# Patient Record
Sex: FEMALE | Race: WHITE | NOT HISPANIC OR LATINO | Employment: OTHER | URBAN - METROPOLITAN AREA
[De-identification: names, ages, dates, MRNs, and addresses within clinical notes are randomized per-mention and may not be internally consistent; named-entity substitution may affect disease eponyms.]

---

## 2018-04-07 ENCOUNTER — APPOINTMENT (EMERGENCY)
Dept: RADIOLOGY | Facility: HOSPITAL | Age: 83
DRG: 282 | End: 2018-04-07
Payer: MEDICARE

## 2018-04-07 ENCOUNTER — APPOINTMENT (EMERGENCY)
Dept: CT IMAGING | Facility: HOSPITAL | Age: 83
DRG: 282 | End: 2018-04-07
Payer: MEDICARE

## 2018-04-07 ENCOUNTER — HOSPITAL ENCOUNTER (INPATIENT)
Facility: HOSPITAL | Age: 83
LOS: 2 days | Discharge: HOME | DRG: 282 | End: 2018-04-09
Attending: INTERNAL MEDICINE | Admitting: INTERNAL MEDICINE
Payer: MEDICARE

## 2018-04-07 DIAGNOSIS — R77.8 ELEVATED TROPONIN: ICD-10-CM

## 2018-04-07 DIAGNOSIS — I16.0 HYPERTENSIVE URGENCY: Primary | ICD-10-CM

## 2018-04-07 DIAGNOSIS — E87.6 HYPOKALEMIA: ICD-10-CM

## 2018-04-07 DIAGNOSIS — R09.02 HYPOXIA: ICD-10-CM

## 2018-04-07 DIAGNOSIS — R79.89 LOW SERUM THYROID STIMULATING HORMONE (TSH): ICD-10-CM

## 2018-04-07 PROBLEM — F41.9 ANXIETY: Status: ACTIVE | Noted: 2018-04-07

## 2018-04-07 PROBLEM — M19.90 ARTHRITIS: Status: ACTIVE | Noted: 2018-04-07

## 2018-04-07 LAB
ALBUMIN SERPL BCP-MCNC: 3.7 G/DL (ref 3.5–5)
ALP SERPL-CCNC: 146 U/L (ref 46–116)
ALT SERPL W P-5'-P-CCNC: 27 U/L (ref 12–78)
ANION GAP SERPL CALCULATED.3IONS-SCNC: 9 MMOL/L (ref 4–13)
ANISOCYTOSIS BLD QL SMEAR: PRESENT
APTT PPP: 28 SECONDS (ref 23–35)
AST SERPL W P-5'-P-CCNC: 20 U/L (ref 5–45)
BACTERIA UR QL AUTO: ABNORMAL /HPF
BASOPHILS # BLD MANUAL: 0 THOUSAND/UL (ref 0–0.1)
BASOPHILS NFR MAR MANUAL: 0 % (ref 0–1)
BILIRUB SERPL-MCNC: 0.3 MG/DL (ref 0.2–1)
BILIRUB UR QL STRIP: NEGATIVE
BUN SERPL-MCNC: 17 MG/DL (ref 5–25)
CALCIUM SERPL-MCNC: 10 MG/DL (ref 8.3–10.1)
CHLORIDE SERPL-SCNC: 102 MMOL/L (ref 100–108)
CLARITY UR: CLEAR
CO2 SERPL-SCNC: 32 MMOL/L (ref 21–32)
COLOR UR: YELLOW
CREAT SERPL-MCNC: 0.88 MG/DL (ref 0.6–1.3)
EOSINOPHIL # BLD MANUAL: 0.33 THOUSAND/UL (ref 0–0.4)
EOSINOPHIL NFR BLD MANUAL: 3 % (ref 0–6)
ERYTHROCYTE [DISTWIDTH] IN BLOOD BY AUTOMATED COUNT: 17.2 % (ref 11.6–15.1)
GFR SERPL CREATININE-BSD FRML MDRD: 59 ML/MIN/1.73SQ M
GLUCOSE SERPL-MCNC: 84 MG/DL (ref 65–140)
GLUCOSE UR STRIP-MCNC: NEGATIVE MG/DL
HCT VFR BLD AUTO: 41.5 % (ref 34.8–46.1)
HGB BLD-MCNC: 12.7 G/DL (ref 11.5–15.4)
HGB UR QL STRIP.AUTO: NEGATIVE
INR PPP: 0.93 (ref 0.86–1.16)
KETONES UR STRIP-MCNC: NEGATIVE MG/DL
LEUKOCYTE ESTERASE UR QL STRIP: ABNORMAL
LYMPHOCYTES # BLD AUTO: 1.34 THOUSAND/UL (ref 0.6–4.47)
LYMPHOCYTES # BLD AUTO: 12 % (ref 14–44)
MAGNESIUM SERPL-MCNC: 1.8 MG/DL (ref 1.6–2.6)
MCH RBC QN AUTO: 25.4 PG (ref 26.8–34.3)
MCHC RBC AUTO-ENTMCNC: 30.6 G/DL (ref 31.4–37.4)
MCV RBC AUTO: 83 FL (ref 82–98)
MONOCYTES # BLD AUTO: 0.67 THOUSAND/UL (ref 0–1.22)
MONOCYTES NFR BLD: 6 % (ref 4–12)
NEUTROPHILS # BLD MANUAL: 8.36 THOUSAND/UL (ref 1.85–7.62)
NEUTS BAND NFR BLD MANUAL: 1 % (ref 0–8)
NEUTS SEG NFR BLD AUTO: 74 % (ref 43–75)
NITRITE UR QL STRIP: NEGATIVE
NON-SQ EPI CELLS URNS QL MICRO: ABNORMAL /HPF
NT-PROBNP SERPL-MCNC: 967 PG/ML
PH UR STRIP.AUTO: 7 [PH] (ref 4.5–8)
PLATELET # BLD AUTO: 348 THOUSANDS/UL (ref 149–390)
PLATELET # BLD AUTO: 404 THOUSANDS/UL (ref 149–390)
PLATELET BLD QL SMEAR: ADEQUATE
PMV BLD AUTO: 10.7 FL (ref 8.9–12.7)
PMV BLD AUTO: 11.1 FL (ref 8.9–12.7)
POIKILOCYTOSIS BLD QL SMEAR: PRESENT
POTASSIUM SERPL-SCNC: 3.3 MMOL/L (ref 3.5–5.3)
PROT SERPL-MCNC: 7.5 G/DL (ref 6.4–8.2)
PROT UR STRIP-MCNC: ABNORMAL MG/DL
PROTHROMBIN TIME: 12.7 SECONDS (ref 12.1–14.4)
RBC # BLD AUTO: 5 MILLION/UL (ref 3.81–5.12)
RBC #/AREA URNS AUTO: ABNORMAL /HPF
SODIUM SERPL-SCNC: 143 MMOL/L (ref 136–145)
SP GR UR STRIP.AUTO: 1.01 (ref 1–1.03)
TOTAL CELLS COUNTED SPEC: 100
TROPONIN I SERPL-MCNC: 0.08 NG/ML
TROPONIN I SERPL-MCNC: 0.09 NG/ML
TROPONIN I SERPL-MCNC: 0.1 NG/ML
TSH SERPL DL<=0.05 MIU/L-ACNC: 0.01 UIU/ML (ref 0.36–3.74)
UROBILINOGEN UR QL STRIP.AUTO: 0.2 E.U./DL
VARIANT LYMPHS # BLD AUTO: 4 %
WBC # BLD AUTO: 11.14 THOUSAND/UL (ref 4.31–10.16)
WBC #/AREA URNS AUTO: ABNORMAL /HPF

## 2018-04-07 PROCEDURE — 85027 COMPLETE CBC AUTOMATED: CPT | Performed by: PHYSICIAN ASSISTANT

## 2018-04-07 PROCEDURE — 84484 ASSAY OF TROPONIN QUANT: CPT | Performed by: INTERNAL MEDICINE

## 2018-04-07 PROCEDURE — 84443 ASSAY THYROID STIM HORMONE: CPT | Performed by: PHYSICIAN ASSISTANT

## 2018-04-07 PROCEDURE — 81001 URINALYSIS AUTO W/SCOPE: CPT

## 2018-04-07 PROCEDURE — 85049 AUTOMATED PLATELET COUNT: CPT | Performed by: INTERNAL MEDICINE

## 2018-04-07 PROCEDURE — 96374 THER/PROPH/DIAG INJ IV PUSH: CPT

## 2018-04-07 PROCEDURE — 99285 EMERGENCY DEPT VISIT HI MDM: CPT

## 2018-04-07 PROCEDURE — 85730 THROMBOPLASTIN TIME PARTIAL: CPT | Performed by: PHYSICIAN ASSISTANT

## 2018-04-07 PROCEDURE — 96376 TX/PRO/DX INJ SAME DRUG ADON: CPT

## 2018-04-07 PROCEDURE — 85610 PROTHROMBIN TIME: CPT | Performed by: PHYSICIAN ASSISTANT

## 2018-04-07 PROCEDURE — 80053 COMPREHEN METABOLIC PANEL: CPT | Performed by: PHYSICIAN ASSISTANT

## 2018-04-07 PROCEDURE — 70450 CT HEAD/BRAIN W/O DYE: CPT

## 2018-04-07 PROCEDURE — 71046 X-RAY EXAM CHEST 2 VIEWS: CPT

## 2018-04-07 PROCEDURE — 93005 ELECTROCARDIOGRAM TRACING: CPT

## 2018-04-07 PROCEDURE — 83735 ASSAY OF MAGNESIUM: CPT | Performed by: PHYSICIAN ASSISTANT

## 2018-04-07 PROCEDURE — 84484 ASSAY OF TROPONIN QUANT: CPT | Performed by: PHYSICIAN ASSISTANT

## 2018-04-07 PROCEDURE — 36415 COLL VENOUS BLD VENIPUNCTURE: CPT | Performed by: PHYSICIAN ASSISTANT

## 2018-04-07 PROCEDURE — 83880 ASSAY OF NATRIURETIC PEPTIDE: CPT | Performed by: PHYSICIAN ASSISTANT

## 2018-04-07 PROCEDURE — 99223 1ST HOSP IP/OBS HIGH 75: CPT | Performed by: INTERNAL MEDICINE

## 2018-04-07 PROCEDURE — 85007 BL SMEAR W/DIFF WBC COUNT: CPT | Performed by: PHYSICIAN ASSISTANT

## 2018-04-07 RX ORDER — ONDANSETRON 2 MG/ML
4 INJECTION INTRAMUSCULAR; INTRAVENOUS EVERY 6 HOURS PRN
Status: DISCONTINUED | OUTPATIENT
Start: 2018-04-07 | End: 2018-04-09 | Stop reason: HOSPADM

## 2018-04-07 RX ORDER — VENLAFAXINE HYDROCHLORIDE 37.5 MG/1
75 CAPSULE, EXTENDED RELEASE ORAL DAILY
Status: DISCONTINUED | OUTPATIENT
Start: 2018-04-08 | End: 2018-04-09 | Stop reason: HOSPADM

## 2018-04-07 RX ORDER — LABETALOL HYDROCHLORIDE 5 MG/ML
10 INJECTION, SOLUTION INTRAVENOUS ONCE
Status: COMPLETED | OUTPATIENT
Start: 2018-04-07 | End: 2018-04-07

## 2018-04-07 RX ORDER — VENLAFAXINE 37.5 MG/1
75 TABLET ORAL
Status: DISCONTINUED | OUTPATIENT
Start: 2018-04-08 | End: 2018-04-07

## 2018-04-07 RX ORDER — TRIAMTERENE AND HYDROCHLOROTHIAZIDE 37.5; 25 MG/1; MG/1
1 TABLET ORAL DAILY
Status: DISCONTINUED | OUTPATIENT
Start: 2018-04-08 | End: 2018-04-09 | Stop reason: HOSPADM

## 2018-04-07 RX ORDER — ACETAMINOPHEN 325 MG/1
650 TABLET ORAL EVERY 6 HOURS PRN
Status: DISCONTINUED | OUTPATIENT
Start: 2018-04-07 | End: 2018-04-09 | Stop reason: HOSPADM

## 2018-04-07 RX ORDER — VIT A/VIT C/VIT E/ZINC/COPPER 4296-226
1 CAPSULE ORAL 2 TIMES DAILY
Status: DISCONTINUED | OUTPATIENT
Start: 2018-04-08 | End: 2018-04-09 | Stop reason: HOSPADM

## 2018-04-07 RX ORDER — ASPIRIN 81 MG/1
324 TABLET, CHEWABLE ORAL ONCE
Status: COMPLETED | OUTPATIENT
Start: 2018-04-07 | End: 2018-04-07

## 2018-04-07 RX ORDER — ASPIRIN 81 MG/1
81 TABLET, CHEWABLE ORAL EVERY MORNING
Status: DISCONTINUED | OUTPATIENT
Start: 2018-04-08 | End: 2018-04-09 | Stop reason: HOSPADM

## 2018-04-07 RX ORDER — LISINOPRIL 10 MG/1
10 TABLET ORAL DAILY
Status: DISCONTINUED | OUTPATIENT
Start: 2018-04-08 | End: 2018-04-08

## 2018-04-07 RX ORDER — CHOLECALCIFEROL (VITAMIN D3) 10 MCG
1 TABLET ORAL 2 TIMES DAILY
Status: DISCONTINUED | OUTPATIENT
Start: 2018-04-07 | End: 2018-04-09 | Stop reason: HOSPADM

## 2018-04-07 RX ORDER — DIPHENHYDRAMINE HYDROCHLORIDE 50 MG/ML
25 INJECTION INTRAMUSCULAR; INTRAVENOUS EVERY 6 HOURS PRN
Status: DISCONTINUED | OUTPATIENT
Start: 2018-04-07 | End: 2018-04-09 | Stop reason: HOSPADM

## 2018-04-07 RX ORDER — ALPRAZOLAM 0.25 MG/1
0.25 TABLET ORAL 3 TIMES DAILY PRN
Status: DISCONTINUED | OUTPATIENT
Start: 2018-04-07 | End: 2018-04-09 | Stop reason: HOSPADM

## 2018-04-07 RX ORDER — HYDRALAZINE HYDROCHLORIDE 20 MG/ML
10 INJECTION INTRAMUSCULAR; INTRAVENOUS EVERY 4 HOURS PRN
Status: DISCONTINUED | OUTPATIENT
Start: 2018-04-07 | End: 2018-04-08

## 2018-04-07 RX ORDER — CALCIUM CARBONATE 200(500)MG
1000 TABLET,CHEWABLE ORAL DAILY PRN
Status: DISCONTINUED | OUTPATIENT
Start: 2018-04-07 | End: 2018-04-09 | Stop reason: HOSPADM

## 2018-04-07 RX ORDER — DOCUSATE SODIUM 100 MG/1
100 CAPSULE, LIQUID FILLED ORAL 2 TIMES DAILY
Status: DISCONTINUED | OUTPATIENT
Start: 2018-04-07 | End: 2018-04-09 | Stop reason: HOSPADM

## 2018-04-07 RX ADMIN — HYDRALAZINE HYDROCHLORIDE 10 MG: 20 INJECTION INTRAMUSCULAR; INTRAVENOUS at 17:37

## 2018-04-07 RX ADMIN — LABETALOL 20 MG/4 ML (5 MG/ML) INTRAVENOUS SYRINGE 10 MG: at 13:37

## 2018-04-07 RX ADMIN — ASPIRIN 81 MG 324 MG: 81 TABLET ORAL at 13:36

## 2018-04-07 RX ADMIN — ACETAMINOPHEN 650 MG: 325 TABLET, FILM COATED ORAL at 22:38

## 2018-04-07 RX ADMIN — DIPHENHYDRAMINE HYDROCHLORIDE 25 MG: 50 INJECTION, SOLUTION INTRAMUSCULAR; INTRAVENOUS at 21:34

## 2018-04-07 RX ADMIN — NEPHROCAP 1 CAPSULE: 1 CAP ORAL at 18:43

## 2018-04-07 RX ADMIN — LABETALOL 20 MG/4 ML (5 MG/ML) INTRAVENOUS SYRINGE 10 MG: at 14:41

## 2018-04-07 NOTE — ED PROVIDER NOTES
History  Chief Complaint   Patient presents with    Hypertension     pt reports recent medication change lisinipril where they double her dose, Pt states she checked her PB and it was high, being unable to get ahold of her PCP so they came here  Pt has 02 sat of 83 ra      This 60-year-old white female presents emergency room with an elevated blood pressure  She states she took her pressure this morning because she was having a slight dull headache and it was over 200  She was just recently changed from Norvasc to lisinopril  She was initially placed on 25 mg lisinopril that was recently increased to 50 mg  She took her  Medication this morning  She denies any chest pain or shortness of breath  She denies any shortness of breath with activity  She does complain of swelling in her ankles but no specific weight gain  She did present with a pulse ox of 82%  She was immediately put on  Oxygen by nasal cannula and now her saturations are 97% on 2 L  She denies any black tarry stools or bright red blood per rectum  She complains of urinary frequency that is from her Lasix medication  Past medical history is positive for anxiety, arthritis, cancer- skin, acute coronary disease, hearing impairment requiring a hearing aid, hiatal hernia, hypertension, heart murmur, shortness of breath on exertion  Differential diagnosis includes but is not limited to malignant hypertension, hypertensive urgency, end-organ damage, acute coronary syndrome, CHF, pulmonary embolism, myocarditis, pericarditis          History provided by:  Patient  History limited by:  Acuity of condition  Hypertension   Severity:  Severe  Onset quality:  Gradual  Duration:  2 hours  Timing:  Constant  Progression:  Unchanged  Chronicity:  Chronic  Time since last dose of antihypertensive:  4 hours  Notable PTA blood pressures:  200/110  Context: medication change    Context: normal sodium, not caffeine, not drug abuse, not herbal remedies, not noncompliance, not OTC medications used and not stress    Ineffective treatments: Lisinopril  Associated symptoms: headaches and peripheral edema    Associated symptoms: no abdominal pain, no anxiety, no blurred vision, no chest pain, no confusion, no dizziness, no ear pain, no epistaxis, no fatigue, no fever, no hematuria, no hypokalemia, no loss of consciousness, no nausea, no neck pain, no palpitations, no shortness of breath, no syncope, no tinnitus, not vomiting and no weakness    Risk factors: cardiac disease    Risk factors: no alcohol use, no cocaine use, no decongestant use, no diabetes, no family history of hypertension, no kidney disease, no obesity, no prior aneurysm, no prior stroke, no PVD and no tobacco use        Prior to Admission Medications   Prescriptions Last Dose Informant Patient Reported? Taking? ALPRAZolam (XANAX) 0 25 mg tablet   Yes Yes   Sig: Take 0 25 mg by mouth 3 (three) times a day as needed for anxiety  B Complex Vitamins (B-COMPLEX/B-12 PO)   Yes Yes   Sig: Take 1,500 mg by mouth 2 (two) times a day  Cranberry 32923 MG CAPS   Yes Yes   Sig: Take 1 tablet by mouth 2 (two) times a day  LISINOPRIL PO   Yes Yes   Sig: Take by mouth daily   Multiple Vitamins-Minerals (PRESERVISION AREDS PO)   Yes Yes   Sig: Take 1 tablet by mouth 2 (two) times a day  aspirin 81 MG tablet   Yes Yes   Sig: Take 81 mg by mouth every morning  ibuprofen (MOTRIN) 200 mg tablet   Yes No   Sig: Take 200 mg by mouth every 6 (six) hours as needed for mild pain  multivitamin (THERAGRAN) TABS   Yes Yes   Sig: Take 1 tablet by mouth every morning  triamterene-hydrochlorothiazide (DYAZIDE) 37 5-25 mg per capsule   Yes Yes   Sig: Take 1 capsule by mouth once a week     venlafaxine (EFFEXOR) 75 mg tablet   Yes Yes   Sig: Take 75 mg by mouth daily with lunch        Facility-Administered Medications: None       Past Medical History:   Diagnosis Date    Anxiety     Arthritis     left knee    Cancer ([facility] [address] ) skin: face, nose and back (100+ sutures)    Coronary artery disease     Hearing aid worn     bilat    Hiatal hernia     [place] (hard of hearing)     bilat HA    Hypertension     Murmur, heart     SOB (shortness of breath) on exertion     Wears glasses        Past Surgical History:   Procedure Laterality Date    APPENDECTOMY      age 15   [employer] EYE SURGERY Left     Cataract with IOL    JOINT REPLACEMENT Left 2010    knee    OH REMV CATARACT EXTRACAP,INSERT LENS Right 8/8/2016    Procedure: EXTRACTION EXTRACAPSULAR CATARACT PHACO INTRAOCULAR LENS (IOL); Surgeon: Heidy Harrington MD;  [facility] MAIN OR;  Service: Ophthalmology    RESECTION TONSIL RADICAL      SKIN BIOPSY      face, back, right leg    SPLENECTOMY, TOTAL         Family History   Problem Relation Age of Onset    Diabetes Mother     Heart disease Father      massive MI exp 72    Kidney disease Brother      I have reviewed and agree with the history as documented  Social History   Substance Use Topics    Smoking status: Never Smoker    Smokeless tobacco: Never Used    Alcohol use 0 6 oz/week     1 Glasses of wine per week      Comment: rarely        Review of Systems   Constitutional: Positive for activity change and appetite change  Negative for chills, diaphoresis, fatigue and fever  HENT: Negative for congestion, dental problem, ear discharge, ear pain, mouth sores, nosebleeds, sinus pain, sinus pressure and tinnitus  Eyes: Negative for blurred vision, pain, discharge, redness and itching  Respiratory: Negative for chest tightness and shortness of breath  Cardiovascular: Positive for leg swelling  Negative for chest pain, palpitations and syncope  Gastrointestinal: Negative for abdominal pain, anal bleeding, blood in stool, nausea and vomiting  Genitourinary: Positive for frequency  Negative for dysuria, hematuria and urgency  Frequency related to diuretic use   Musculoskeletal: Negative for neck pain  Skin: Negative for color change, pallor, rash and wound  Neurological: Positive for headaches  Negative for dizziness, loss of consciousness and weakness  Hematological: Negative for adenopathy  Does not bruise/bleed easily  Psychiatric/Behavioral: Negative for confusion  The patient is not nervous/anxious  All other systems reviewed and are negative  Physical Exam  ED Triage Vitals [04/07/18 1205]   Temperature Pulse Respirations Blood Pressure SpO2   (!) 97 3 °F (36 3 °C) 92 (!) 24 (!) 202/106 (!) 82 %      Temp Source Heart Rate Source Patient Position - Orthostatic VS BP Location FiO2 (%)   Oral Monitor Lying Right arm --      Pain Score       No Pain           Orthostatic Vital Signs  Vitals:    04/07/18 1445 04/07/18 1447 04/07/18 1500 04/07/18 1517   BP: (!) 190/102  (!) 199/104 (!) 200/98   Pulse: 82 82 80 84   Patient Position - Orthostatic VS:    Sitting       Physical Exam   Constitutional: She is oriented to person, place, and time  She appears well-developed and well-nourished  No distress  HENT:   Head: Normocephalic  Right Ear: External ear normal    Left Ear: External ear normal    Nose: Nose normal    Mouth/Throat: Oropharynx is clear and moist    Eyes: Conjunctivae are normal  Right eye exhibits no discharge  Left eye exhibits no discharge  Neck: Neck supple  No JVD present  No tracheal deviation present  No thyromegaly present  Cardiovascular: Normal rate and regular rhythm  Murmur heard  Pulmonary/Chest: Effort normal and breath sounds normal  No stridor  Abdominal: Soft  She exhibits no distension and no mass  There is no tenderness  There is no rebound and no guarding  Musculoskeletal: She exhibits edema  She exhibits no tenderness  Lymphadenopathy:     She has no cervical adenopathy  Neurological: She is alert and oriented to person, place, and time  Skin: Capillary refill takes less than 2 seconds  She is not diaphoretic     Psychiatric: She has a normal mood and affect  Her behavior is normal  Judgment and thought content normal    Nursing note and vitals reviewed  ED Medications  Medications   labetalol (NORMODYNE) injection 10 mg (10 mg Intravenous Given 4/7/18 1337)   aspirin chewable tablet 324 mg (324 mg Oral Given 4/7/18 1336)   labetalol (NORMODYNE) injection 10 mg (10 mg Intravenous Given 4/7/18 1441)       Diagnostic Studies  Results Reviewed     Procedure Component Value Units Date/Time    Urine Microscopic [[id number]]  (Abnormal) Collected:  04/07/18 1448    Lab Status:  Final result Specimen:  Urine from Urine, Clean Catch Updated:  04/07/18 1516     RBC, UA None Seen /hpf      WBC, UA 4-10 (A) /hpf      Epithelial Cells Occasional /hpf      Bacteria, UA Innumerable (A) /hpf     ED Urine Macroscopic [[id number]]  (Abnormal) Collected:  04/07/18 1448    Lab Status:  Final result Specimen:  Urine Updated:  04/07/18 1448     Color, UA Yellow     Clarity, UA Clear     pH, UA 7 0     Leukocytes, UA Small (A)     Nitrite, UA Negative     Protein, UA 30 (1+) (A) mg/dl      Glucose, UA Negative mg/dl      Ketones, UA Negative mg/dl      Urobilinogen, UA 0 2 E U /dl      Bilirubin, UA Negative     Blood, UA Negative     Specific Gravity, UA 1 015    Narrative:       CLINITEK RESULT    TSH [[id number]]  (Abnormal) Collected:  04/07/18 1229    Lab Status:  Final result Specimen:  Blood from Arm, Right Updated:  04/07/18 1304     TSH 3RD GENERATON 0 014 (L) uIU/mL     Narrative:         Patients undergoing fluorescein dye angiography may retain small amounts of fluorescein in the body for 48-72 hours post procedure  Samples containing fluorescein can produce falsely depressed TSH values  If the patient had this procedure,a specimen should be resubmitted post fluorescein clearance            The recommended reference ranges for TSH during pregnancy are as follows:  First trimester 0 1 to 2 5 uIU/mL  Second trimester  0 2 to 3 0 uIU/mL  Third trimester 0 3 to 3 0 uIU/m      Magnesium [[id number]]  (Normal) Collected:  04/07/18 1229    Lab Status:  Final result Specimen:  Blood from Arm, Right Updated:  04/07/18 1304     Magnesium 1 8 mg/dL     BNP [[id number]]  (Abnormal) Collected:  04/07/18 1229    Lab Status:  Final result Specimen:  Blood from Arm, Right Updated:  04/07/18 1304     NT-proBNP 967 (H) pg/mL     CBC and differential [[id number]]  (Abnormal) Collected:  04/07/18 1229    Lab Status:  Final result Specimen:  Blood from Arm, Right Updated:  04/07/18 1302     WBC 11 14 (H) Thousand/uL      RBC 5 00 Million/uL      Hemoglobin 12 7 g/dL      Hematocrit 41 5 %      MCV 83 fL      MCH 25 4 (L) pg      MCHC 30 6 (L) g/dL      RDW 17 2 (H) %      MPV 11 1 fL      Platelets 833 (H) Thousands/uL     Narrative: This is an appended report  These results have been appended to a previously verified report  Troponin I [[id number]]  (Abnormal) Collected:  04/07/18 1229    Lab Status:  Final result Specimen:  Blood from Arm, Right Updated:  04/07/18 1256     Troponin I 0 08 (H) ng/mL     Narrative:         [employer] Chemistry analyzer 99% cutoff is > 0 04 ng/mL in network labs    o cTnI 99% cutoff is useful only when applied to patients in the clinical setting of myocardial ischemia  o cTnI 99% cutoff should be interpreted in the context of clinical history, ECG findings and possibly cardiac imaging to establish correct diagnosis  o cTnI 99% cutoff may be suggestive but clearly not indicative of a coronary event without the clinical setting of myocardial ischemia      Comprehensive metabolic panel [[id number]]  (Abnormal) Collected:  04/07/18 1229    Lab Status:  Final result Specimen:  Blood from Arm, Right Updated:  04/07/18 1254     Sodium 143 mmol/L      Potassium 3 3 (L) mmol/L      Chloride 102 mmol/L      CO2 32 mmol/L      Anion Gap 9 mmol/L      BUN 17 mg/dL      Creatinine 0 88 mg/dL      Glucose 84 mg/dL      Calcium 10 0 mg/dL      AST 20 U/L      ALT 27 U/L Alkaline Phosphatase 146 (H) U/L      Total Protein 7 5 g/dL      Albumin 3 7 g/dL      Total Bilirubin 0 30 mg/dL      eGFR 59 ml/min/1 73sq m     Narrative:         National Kidney Disease Education Program recommendations are as follows:  GFR calculation is accurate only with a steady state creatinine  Chronic Kidney disease less than 60 ml/min/1 73 sq  meters  Kidney failure less than 15 ml/min/1 73 sq  meters  Norma Astorga [[id number]]  (Normal) Collected:  04/07/18 1229    Lab Status:  Final result Specimen:  Blood from Arm, Right Updated:  04/07/18 1247     Protime 12 7 seconds      INR 0 93    APTT [[id number]]  (Normal) Collected:  04/07/18 1229    Lab Status:  Final result Specimen:  Blood from Arm, Right Updated:  04/07/18 1247     PTT 28 seconds     Narrative: Therapeutic Heparin Range = 60-90 seconds                 CT head without contrast   ED Interpretation by Alejandrina Sawant PA-C (04/07 1426)   No acute intracranial abnormality      Final Result by Moira Burden MD (04/07 1314)      No acute intracranial abnormality  Workstation performed: [id number]         XR chest 2 views   ED Interpretation by Alejandrina Sawant PA-C (04/07 1426)      Persistent cardiomegaly  Workstation performed: [id number]         Final Result by Bhavani Philippe MD (04/07 1329)      Persistent cardiomegaly              Workstation performed: [id number]                    Procedures  ECG 12 Lead Documentation  Date/Time: 4/7/2018 12:32 PM  Performed by: Maco Dukes  Authorized by: Maco Dukes     Indications / Diagnosis:   headache, hypertensive urgency  ECG reviewed by me, the ED Provider: yes    Patient location:  ED  Previous ECG:     Previous ECG:  Compared to current    Comparison ECG info:  5/4/13    Similarity:  Changes noted    Comparison to cardiac monitor: Yes    Rate:     ECG rate:  94    ECG rate assessment: normal    Rhythm:     Rhythm: sinus rhythm Ectopy:     Ectopy: PVCs    QRS:     QRS axis:  Right    QRS intervals:  Normal  Conduction:     Conduction: abnormal      Abnormal conduction: 1st degree    ST segments:     ST segments:  Non-specific  T waves:     T waves: non-specific             Phone Contacts  ED Phone Contact    ED Course  ED Course as of Apr 07 1643   Sat Apr 07, 2018   1420 /101 after IV Labetolol                                MDM  Number of Diagnoses or Management Options  Elevated troponin: new and requires workup  Hypertensive urgency: new and requires workup  Hypoxia: new and requires workup  Low serum thyroid stimulating hormone (TSH): new and requires workup     Amount and/or Complexity of Data Reviewed  Clinical lab tests: ordered and reviewed  Tests in the radiology section of CPT®: ordered and reviewed  Tests in the medicine section of CPT®: ordered and reviewed    Risk of Complications, Morbidity, and/or Mortality  Presenting problems: high  Diagnostic procedures: high  Management options: high  General comments:   Patient presented to the emergency room with the elevated blood pressure  She was complaining of asymptomatic headache  She was seen and evaluated  Her blood pressure was treated with labetalol  She did have an elevated troponin on her end-organ damage workup  She had a  Low TSH as well  She was the medicated with aspirin as she was asymptomatic with chest pain and shortness of breath with exertion  She was admitted to the [facility] service  the telemetry floor      Patient Progress  Patient progress: stable    CritCare Time    Disposition  Final diagnoses:   Hypertensive urgency   Elevated troponin   Hypoxia   Low serum thyroid stimulating hormone (TSH)     Time reflects when diagnosis was documented in both MDM as applicable and the Disposition within this note     Time User Action Codes Description Comment    4/7/2018  2:38 PM Sidra Corbett [I16 0] Hypertensive urgency     4/7/2018  2:38 PM Persian Lobe [R74 8] Elevated troponin     4/7/2018  2:38 PM Edgardo Naif Add [R09 02] Hypoxia     4/7/2018  2:39 PM Edgardo Naif Add [R94 6] Low serum thyroid stimulating hormone (TSH)     4/7/2018  4:40 PM Melvin Floss B Modify [I16 0] Hypertensive urgency     4/7/2018  4:40 PM Melvin Floss B Modify [R74 8] Elevated troponin       ED Disposition     ED Disposition Condition Comment    Admit  Case was discussed with Dr Zeus Aguilar and the patient's admission status was agreed to be Admission Status: inpatient status to the service of Dr Dr Zeus Aguilar   Follow-up Information    None       Patient's Medications   Discharge Prescriptions    No medications on file     No discharge procedures on file      ED Provider  Electronically Signed by           Milagros Pop PA-C  04/07/18 7351

## 2018-04-07 NOTE — H&P
History and Physical - [facility] Internal Medicine    Patient Information: Marcia Zarate 80 y o  female MRN: [id number]  Unit/Bed#: ED 06 Encounter: [id number]  Admitting Physician: Nimco Zuleta MD  PCP: Irene Gleason MD  Date of Admission:  04/07/18    Assessment/Plan:    Hospital Problem List:     Principal Problem:    Hypertensive urgency  Active Problems:    Elevated troponin    Anxiety    Arthritis    Hypokalemia      Plan for the Primary Problem(s):    · Hypertensive urgency - will provide her with IV hydralazine, continue lisinopril and triamterene hydrochlorothiazide, consult Cardiology  · Elevated troponins likely due to 1 above check troponins      Plan for Additional Problems:   · Hypokalemia replete monitor  · Arthritis Tylenol p r n  · Anxiety    The patient's daughter is at bedside, the patient and the daughter agreeable with ongoing management as outlined as well as consult requested  VTE Prophylaxis: Enoxaparin (Lovenox)  / sequential compression device   Code Status:  Full Code  POLST: There is no POLST form on file for this patient (pre-hospital)    Anticipated Length of Stay:  Patient will be admitted on an Inpatient basis with an anticipated length of stay of  More than 2 midnights  Justification for Hospital Stay:  Hypertensive urgency, elevated troponin requiring close monitoring further evaluation as outlined      Chief Complaint:       High blood pressure    History of Present Illness:    Marcia Zarate is a 80 y o  female who presents with high blood pressure systolic to 227D  Patient follows with Dr Mimi Beltran, she was switched from amlodipine to lisinopril about 2 weeks back due to increasing lower extremity swelling which was attributed due to amlodipine    Since the switch she reports of blood pressures have been uncontrolled, today she checked her blood pressure and noted her blood pressure to be 818F systolic, she also reported headache hence presented to the ED for further evaluation  Initially in the ED she was noted to have hypoxia which improved with supplemental oxygen, presently at the time of my interview her oxygen saturations are 95% on 2-3 L supplemental oxygen  She denies chest pain diaphoresis palpitations presyncope syncope  Denies shortness of breath no history of orthopnea PND  No history of cough chest tightness wheezing  Denies history of fever chills sweats or constitutional symptoms  Denies urinary symptoms  Denies abdominal pain nausea vomiting or diarrhea  Denies focal weakness stent, speech abnormalities  Review of Systems:    Review of Systems   All other systems reviewed and are negative  Past Medical and Surgical History:     Past Medical History:   Diagnosis Date    Anxiety     Arthritis     left knee    Cancer (HCC)     skin: face, nose and back (100+ sutures)    Coronary artery disease     Hearing aid worn     bilat    Hiatal hernia     [place] (hard of hearing)     bilat HA    Hypertension     Murmur, heart     SOB (shortness of breath) on exertion     Wears glasses        Past Surgical History:   Procedure Laterality Date    APPENDECTOMY      age 15   [facility] EYE SURGERY Left     Cataract with IOL    JOINT REPLACEMENT Left 2010    knee    SC REMV CATARACT EXTRACAP,INSERT LENS Right 8/8/2016    Procedure: EXTRACTION EXTRACAPSULAR CATARACT PHACO INTRAOCULAR LENS (IOL); Surgeon: Holger Shelley MD;  Location: [facility] MAIN OR;  Service: Ophthalmology    RESECTION TONSIL RADICAL      SKIN BIOPSY      face, back, right leg    SPLENECTOMY, TOTAL         Meds/Allergies:    Prior to Admission medications    Medication Sig Start Date End Date Taking? Authorizing Provider   ALPRAZolam Budliliana Cuevas) 0 25 mg tablet Take 0 25 mg by mouth 3 (three) times a day as needed for anxiety  Yes Historical Provider, MD   aspirin 81 MG tablet Take 81 mg by mouth every morning     Yes Historical Provider, MD   B Complex Vitamins (B-COMPLEX/B-12 PO) Take 1,500 mg by mouth 2 (two) times a day  Yes Historical Provider, MD   Cranberry 29129 MG CAPS Take 1 tablet by mouth 2 (two) times a day  Yes Historical Provider, MD   LISINOPRIL PO Take by mouth daily   Yes Historical Provider, MD   Multiple Vitamins-Minerals (PRESERVISION AREDS PO) Take 1 tablet by mouth 2 (two) times a day  Yes Historical Provider, MD   multivitamin (THERAGRAN) TABS Take 1 tablet by mouth every morning  Yes Historical Provider, MD   triamterene-hydrochlorothiazide (DYAZIDE) 37 5-25 mg per capsule Take 1 capsule by mouth once a week     Yes Historical Provider, MD   venlafaxine (EFFEXOR) 75 mg tablet Take 75 mg by mouth daily with lunch  Yes Historical Provider, MD   ibuprofen (MOTRIN) 200 mg tablet Take 200 mg by mouth every 6 (six) hours as needed for mild pain  Historical Provider, MD   amLODIPine (NORVASC) 5 mg tablet Take 5 mg by mouth 2 (two) times a day  4/7/18  Historical Provider, MD   Calcium Citrate-Vitamin D (CALCIUM + D PO) Take 1 tablet by mouth every morning  4/7/18  Historical Provider, MD   POTASSIUM CHLORIDE PO Take 10 mEq by mouth 2 (two) times a day  Takes 2 tabs with each dose=20 meq each dose  4/7/18  Historical Provider, MD     I have reviewed home medications with patient family member  Allergies: No Known Allergies    Social History:     Marital Status:     Occupation:   Patient Pre-hospital Living Situation:  Home  Patient Pre-hospital Level of Mobility:  Independently, uses a cane  Patient Pre-hospital Diet Restrictions: no  Substance Use History:   History   Alcohol Use    0 6 oz/week    1 Glasses of wine per week     Comment: rarely     History   Smoking Status    Never Smoker   Smokeless Tobacco    Never Used     History   Drug Use No       Family History:    Family History   Problem Relation Age of Onset    Diabetes Mother     Heart disease Father      massive MI exp 72    Kidney disease Brother        Physical Exam:     Vitals:   Blood Pressure: (!) 200/98 (04/07/18 1517)  Pulse: 84 (04/07/18 1517)  Temperature: (!) 97 3 °F (36 3 °C) (04/07/18 1205)  Temp Source: Oral (04/07/18 1205)  Respirations: 18 (04/07/18 1517)  Weight - Scale: 82 1 kg (181 lb) (04/07/18 1219)  SpO2: 96 % (04/07/18 1517)    Physical Exam    Patient is pleasant 80 year woman comfortably sitting up in bed  Neck supple  Lungs clear to auscultation no additional sounds  Heart sounds S1-S2 noted regular no murmurs appreciable  Abdomen soft nontender  Pulses present  Pedal edema noted right more than left pitting  No rash  Awake alert obeys simple commands      Additional Data:     Lab Results: I have personally reviewed pertinent reports  Results from last 7 days  Lab Units 04/07/18  1229   WBC Thousand/uL 11 14*   HEMOGLOBIN g/dL 12 7   HEMATOCRIT % 41 5   PLATELETS Thousands/uL 404*   LYMPHO PCT % 12*   MONO PCT MAN % 6   EOSINO PCT MANUAL % 3       Results from last 7 days  Lab Units 04/07/18  1229   SODIUM mmol/L 143   POTASSIUM mmol/L 3 3*   CHLORIDE mmol/L 102   CO2 mmol/L 32   BUN mg/dL 17   CREATININE mg/dL 0 88   CALCIUM mg/dL 10 0   TOTAL PROTEIN g/dL 7 5   BILIRUBIN TOTAL mg/dL 0 30   ALK PHOS U/L 146*   ALT U/L 27   AST U/L 20   GLUCOSE RANDOM mg/dL 84       Results from last 7 days  Lab Units 04/07/18  1229   INR  0 93       Imaging: I have personally reviewed pertinent reports  Xr Chest 2 Views    Result Date: 4/7/2018  Narrative: CHEST INDICATION:   Hypertensive urgency, Hypoxia  pt reports recent medication change (lisinopril) where they double her dose, Pt states she checked her BP and it was high COMPARISON:  5/4/2013 EXAM PERFORMED/VIEWS:  XR CHEST PA & LATERAL FINDINGS: Heart shadow is enlarged but unchanged from prior exam  Atherosclerotic calcifications noted  The lungs are clear  No pneumothorax or pleural effusion  Healed fracture deformities of multiple left-sided ribs, stable  Scattered spondylotic changes are also stable       Impression: Persistent cardiomegaly  Workstation performed: [id number]     Ct Head Without Contrast    Result Date: 4/7/2018  Narrative: CT BRAIN - WITHOUT CONTRAST INDICATION:   Hypertension  Headache    COMPARISON:  10/28/2016  TECHNIQUE:  CT examination of the brain was performed  In addition to axial images, coronal 2D reformatted images were created and submitted for interpretation  Radiation dose length product (DLP) for this visit:  1323 mGy-cm   This examination, like all CT scans performed in the [facility], was performed utilizing techniques to minimize radiation dose exposure, including the use of iterative reconstruction and automated exposure control  IMAGE QUALITY:  Diagnostic  FINDINGS: PARENCHYMA: Decreased attenuation is noted in periventricular and subcortical white matter demonstrating an appearance that is statistically most likely to represent moderate microangiopathic change; this appearance is similar when compared to most recent prior examination  No CT signs of acute infarction  No intracranial mass, mass effect or midline shift  No acute parenchymal hemorrhage  VENTRICLES AND EXTRA-AXIAL SPACES:  Ventricles and extra-axial CSF spaces are prominent commensurate with the degree of volume loss  No hydrocephalus  No acute extra-axial hemorrhage  VISUALIZED ORBITS AND PARANASAL SINUSES:  Unremarkable  CALVARIUM AND EXTRACRANIAL SOFT TISSUES:  Normal      Impression: No acute intracranial abnormality  Workstation performed: [id number]       EKG, Pathology, and Other Studies Reviewed on Admission:   · EKG:  Normal sinus rhythm on telemetry    Allscripts / Epic Records Reviewed: Yes     ** Please Note: This note has been constructed using a voice recognition system   **

## 2018-04-07 NOTE — PLAN OF CARE
Problem: Potential for Falls  Goal: Patient will remain free of falls  INTERVENTIONS:  - Assess patient frequently for physical needs  -  Identify cognitive and physical deficits and behaviors that affect risk of falls    -  [place] fall precautions as indicated by assessment   - Educate patient/family on patient safety including physical limitations  - Instruct patient to call for assistance with activity based on assessment  - Modify environment to reduce risk of injury  - Consider OT/PT consult to assist with strengthening/mobility   Outcome: Progressing

## 2018-04-08 LAB
ANION GAP SERPL CALCULATED.3IONS-SCNC: 8 MMOL/L (ref 4–13)
ATRIAL RATE: 98 BPM
BUN SERPL-MCNC: 14 MG/DL (ref 5–25)
CALCIUM SERPL-MCNC: 9.3 MG/DL (ref 8.3–10.1)
CHLORIDE SERPL-SCNC: 105 MMOL/L (ref 100–108)
CO2 SERPL-SCNC: 31 MMOL/L (ref 21–32)
CREAT SERPL-MCNC: 0.78 MG/DL (ref 0.6–1.3)
GFR SERPL CREATININE-BSD FRML MDRD: 69 ML/MIN/1.73SQ M
GLUCOSE SERPL-MCNC: 95 MG/DL (ref 65–140)
MAGNESIUM SERPL-MCNC: 1.9 MG/DL (ref 1.6–2.6)
P AXIS: 81 DEGREES
POTASSIUM SERPL-SCNC: 2.8 MMOL/L (ref 3.5–5.3)
PR INTERVAL: 234 MS
QRS AXIS: 100 DEGREES
QRSD INTERVAL: 88 MS
QT INTERVAL: 348 MS
QTC INTERVAL: 436 MS
SODIUM SERPL-SCNC: 144 MMOL/L (ref 136–145)
T WAVE AXIS: 46 DEGREES
TROPONIN I SERPL-MCNC: 0.09 NG/ML
VENTRICULAR RATE: 94 BPM

## 2018-04-08 PROCEDURE — 93010 ELECTROCARDIOGRAM REPORT: CPT | Performed by: INTERNAL MEDICINE

## 2018-04-08 PROCEDURE — 84484 ASSAY OF TROPONIN QUANT: CPT | Performed by: INTERNAL MEDICINE

## 2018-04-08 PROCEDURE — 83735 ASSAY OF MAGNESIUM: CPT | Performed by: INTERNAL MEDICINE

## 2018-04-08 PROCEDURE — 80048 BASIC METABOLIC PNL TOTAL CA: CPT | Performed by: INTERNAL MEDICINE

## 2018-04-08 PROCEDURE — 99232 SBSQ HOSP IP/OBS MODERATE 35: CPT | Performed by: INTERNAL MEDICINE

## 2018-04-08 RX ORDER — POTASSIUM CHLORIDE 20 MEQ/1
40 TABLET, EXTENDED RELEASE ORAL ONCE
Status: COMPLETED | OUTPATIENT
Start: 2018-04-08 | End: 2018-04-08

## 2018-04-08 RX ORDER — LISINOPRIL 10 MG/1
10 TABLET ORAL 2 TIMES DAILY
Status: DISCONTINUED | OUTPATIENT
Start: 2018-04-08 | End: 2018-04-09 | Stop reason: HOSPADM

## 2018-04-08 RX ORDER — AMLODIPINE BESYLATE 2.5 MG/1
2.5 TABLET ORAL DAILY
Status: DISCONTINUED | OUTPATIENT
Start: 2018-04-08 | End: 2018-04-09 | Stop reason: HOSPADM

## 2018-04-08 RX ADMIN — TRIAMTERENE AND HYDROCHLOROTHIAZIDE 1 TABLET: 37.5; 25 TABLET ORAL at 08:06

## 2018-04-08 RX ADMIN — ACETAMINOPHEN 650 MG: 325 TABLET, FILM COATED ORAL at 23:55

## 2018-04-08 RX ADMIN — ACETAMINOPHEN 650 MG: 325 TABLET, FILM COATED ORAL at 06:02

## 2018-04-08 RX ADMIN — NEPHROCAP 1 CAPSULE: 1 CAP ORAL at 08:06

## 2018-04-08 RX ADMIN — NEPHROCAP 1 CAPSULE: 1 CAP ORAL at 17:48

## 2018-04-08 RX ADMIN — Medication 1 CAPSULE: at 18:18

## 2018-04-08 RX ADMIN — LISINOPRIL 10 MG: 10 TABLET ORAL at 17:48

## 2018-04-08 RX ADMIN — ENOXAPARIN SODIUM 40 MG: 40 INJECTION SUBCUTANEOUS at 08:05

## 2018-04-08 RX ADMIN — POTASSIUM CHLORIDE 40 MEQ: 1500 TABLET, EXTENDED RELEASE ORAL at 10:04

## 2018-04-08 RX ADMIN — FAMOTIDINE 20 MG: 10 INJECTION INTRAVENOUS at 05:57

## 2018-04-08 RX ADMIN — Medication 1 TABLET: at 08:06

## 2018-04-08 RX ADMIN — VENLAFAXINE HYDROCHLORIDE 75 MG: 37.5 CAPSULE, EXTENDED RELEASE ORAL at 08:06

## 2018-04-08 RX ADMIN — DOCUSATE SODIUM 100 MG: 100 CAPSULE, LIQUID FILLED ORAL at 17:48

## 2018-04-08 RX ADMIN — DIPHENHYDRAMINE HYDROCHLORIDE 25 MG: 50 INJECTION, SOLUTION INTRAMUSCULAR; INTRAVENOUS at 04:30

## 2018-04-08 RX ADMIN — ASPIRIN 81 MG 81 MG: 81 TABLET ORAL at 08:06

## 2018-04-08 RX ADMIN — LISINOPRIL 10 MG: 10 TABLET ORAL at 08:06

## 2018-04-08 RX ADMIN — AMLODIPINE BESYLATE 2.5 MG: 2.5 TABLET ORAL at 10:04

## 2018-04-08 RX ADMIN — ALPRAZOLAM 0.25 MG: 0.25 TABLET ORAL at 08:13

## 2018-04-08 NOTE — PROGRESS NOTES
Called by RN as patient is having facial swelling  RN reports that she saw the patient around 7PM and she did not have swelling at that time  However, when she went back in the room around 8PM, the patient had right facial and lip swelling  Patient has received ASA, Nephrocaps, Colace, Labatalol, Hydralazine  Patient has also eaten yogurt and grapes  Patient denies having any allergies  She states that she once had swelling after taking an antibiotic  However, she has not received any antibiotics  Patient denies any associated pain, difficulty swallowing, difficulty breathing, drooling, swelling of her tongue  On examination: significant swelling noted of right cheek and right side of both upper/lower lips  No erythema, warmth  No gum or buccal mucosal erythema, tenderness, abscess noted  Will give a dose of benadryl and monitor airway  If swelling continues, will give pepcid and solumedrol

## 2018-04-08 NOTE — SOCIAL WORK
LOS 1   Not a bundle; Not a readmission  CM reviewed discharge planning process including the following: identifying caregivers at home, preference for d/c planning needs, [facility] Meds to Bed program, availability of treatment team to discuss questions or concerns patient and/or family may have regarding diagnosis, plan of care, old or new medications and discharge planning   CM will continue to follow for care coordination and update assessment as necessary  CM name and role reviewed and Discharge Checklist provided  Encouraged patient and caregiver to review prior to discharge  Pt questioned if she is able to obtain a script for a quad cane however Cm explained that pt's are only allowed one piece of DME every 5 years and it is best to purchase a quad cane from [employer] which are approximately $15 to $20  She stated she was [facility] in going [facility]  Cm explained they have them there as well  Cm will follow in the event there are additional needs prior to DC

## 2018-04-08 NOTE — PROGRESS NOTES
[facility] Progress Note  Patient: Mary Marrero 80 y o  female   MRN: [id number]  PCP: Anna Wolf MD  Unit/Bed#: - Encounter: [id number]  Date Of Visit: 18    Assessment:    Principal Problem:    Hypertensive urgency  Active Problems:    Elevated troponin    Anxiety    Arthritis    Hypokalemia      Plan:    · Hypertensive urgency blood pressure is improving now on amlodipine 2 5 mg, continue lisinopril, cardiology input noted  · Possible angioedema due to hydralazine improving continue Benadryl monitor closely  · Elevated troponin likely due to 1 above  · Hypokalemia replete and monitor  · Arthritis Tylenol p r n  · Anxiety  · Ambulate as able       VTE Pharmacologic Prophylaxis:   Pharmacologic: Enoxaparin (Lovenox)  Mechanical VTE Prophylaxis in Place: Yes    Patient Centered Rounds: I have performed bedside rounds with nursing staff today  Discussions with Specialists or Other Care Team Provider:     Education and Discussions with Family / Patient:  Discussed with the patient and daughter at bedside in detail    Time Spent for Care: 30 minutes  More than 50% of total time spent on counseling and coordination of care as described above      Current Length of Stay: 1 day(s)    Current Patient Status: Inpatient   Certification Statement: The patient will continue to require additional inpatient hospital stay due to As mentioned    Discharge Plan / Estimated Discharge Date:  Likely discharge next 24-48 hours with clinical and symptomatic improvement    Code Status: Level 1 - Full Code      Subjective:     Overnight patient reports developed swelling of her cheeks and tongue, this is likely following hydralazine  Presently she reports improving swelling  Denies dysphagia difficulty with speech  Agreeable with ambulation    Objective:     Vitals:   Temp (24hrs), Av 5 °F (36 9 °C), Min:98 4 °F (36 9 °C), Max:98 7 °F (37 1 °C)    HR:  [] 90  Resp:  [16-18] 18  BP: (148-230)/() 148/82  SpO2:  [90 %-96 %] 90 %  Body mass index is 31 78 kg/m²  Input and Output Summary (last 24 hours): Intake/Output Summary (Last 24 hours) at 04/08/18 1308  Last data filed at 04/08/18 0856   Gross per 24 hour   Intake              360 ml   Output              700 ml   Net             -340 ml       Physical Exam:     Physical Exam     Comfortably lying in bed  Neck supple  Minimal swelling of her cheeks  Lungs clear to auscultation   Heart sounds S1-S2 noted  Abdomen soft nontender  Pulses present  No pedal edema  Awake alert obeys simple commands  No rash      Additional Data:     Labs:      Results from last 7 days  Lab Units 04/07/18  2114 04/07/18  1229   WBC Thousand/uL  --  11 14*   HEMOGLOBIN g/dL  --  12 7   HEMATOCRIT %  --  41 5   PLATELETS Thousands/uL 348 404*   LYMPHO PCT %  --  12*   MONO PCT MAN %  --  6   EOSINO PCT MANUAL %  --  3       Results from last 7 days  Lab Units 04/08/18  0448 04/07/18  1229   SODIUM mmol/L 144 143   POTASSIUM mmol/L 2 8* 3 3*   CHLORIDE mmol/L 105 102   CO2 mmol/L 31 32   BUN mg/dL 14 17   CREATININE mg/dL 0 78 0 88   CALCIUM mg/dL 9 3 10 0   TOTAL PROTEIN g/dL  --  7 5   BILIRUBIN TOTAL mg/dL  --  0 30   ALK PHOS U/L  --  146*   ALT U/L  --  27   AST U/L  --  20   GLUCOSE RANDOM mg/dL 95 84       Results from last 7 days  Lab Units 04/07/18  1229   INR  0 93       * I Have Reviewed All Lab Data Listed Above  * Additional Pertinent Lab Tests Reviewed:  All Labs Within Last 24 Hours Reviewed    Imaging:    Imaging Reports Reviewed Today Include:   Imaging Personally Reviewed by Myself Includes:     Recent Cultures (last 7 days):           Last 24 Hours Medication List:     Current Facility-Administered Medications:  acetaminophen 650 mg Oral Q6H PRN Charlie Zamora PA-C   ALPRAZolam 0 25 mg Oral TID PRN Beth Mosley MD   amLODIPine 2 5 mg Oral Daily Hailey Perez MD   aspirin 81 mg Oral QAM MD cesar Skinner complex-vitamin C-folic acid 1 capsule Oral BID Rosemary Marie MD   calcium carbonate 1,000 mg Oral Daily PRN Rosemary Marie MD   Cranberry 12,600 mg Oral BID Rosemary Marie, MD   diphenhydrAMINE 25 mg Intravenous Q6H PRN Kaylen Cain PA-C   docusate sodium 100 mg Oral BID Rosemary Marie MD   enoxaparin 40 mg Subcutaneous Daily Rosemary Marie MD   hydrALAZINE 10 mg Intravenous Q4H PRN Rosemary Marie, MD   lisinopril 10 mg Oral BID Iman Barnes MD   multivitamin-minerals 1 tablet Oral QAM Rosemary Marie MD   ondansetron 4 mg Intravenous Q6H PRN Rosemary Marie MD   PRESERVISION AREDS 1 capsule Oral BID Rosemary Marie MD   triamterene-hydrochlorothiazide 1 tablet Oral Daily Rosemary Marie MD   venlafaxine 75 mg Oral Daily Viri Cruz PA-C        Today, Patient Was Seen By: Rosemary Marie MD    ** Please Note: This note has been constructed using a voice recognition system   **

## 2018-04-08 NOTE — CONSULTS
Consultation - Cardiology   René Minor 80 y o  female MRN: [id number]  Unit/Bed#: -01 Encounter: [id number]  Physician Requesting Consult: Cate Damon MD  Reason for Consult / Principal Problem: HTN    Assessment:  Principal Problem:    Hypertensive urgency  Active Problems:    Elevated troponin    Anxiety    Arthritis    Hypokalemia      Plan:  I would restart Norvasc at low dose at it is an effective drug in the elderly and apparently was working for her  Increase Lisinopril to 10 mg BID  Replace K+  History of Present Illness     HPI: René Minor is a 80y o  year old female who presents with uncontrolled BP at home associated with headaches  She has had HTN for many year and has been on Norvasc for many years  It was recently stopped because of LE edema  She was switched to Lisinopril 10 mg daily about 2 weeks ago  Her SBPs have been in the 200s  She denies SOB, CP, LE edema at this time  She does not use a lot of salt  BP is running 180- 200 /  in the hospital   She denies any cardiac history including CAD, angina, previous MI, CHF  Troponin is minimally elevated at 0 1          Review of Systems:    Alert awake oriented, comfortable, denies any complaints  No fevers chills nausea vomiting  No weakness, dizziness, seizures  no cough, shortness of breath, or wheezing  Denies any palpitations, chest pain, diaphoresis  Denies leg edema, pain or paresthesias  Denies any skin rashes  Denies abdominal pain, bloody stools, masses  Denies any depression or suicidal ideations      Historical Information   Past Medical History:   Diagnosis Date    Anxiety     Arthritis     left knee    Cancer (HCC)     skin: face, nose and back (100+ sutures)    Coronary artery disease     Hearing aid worn     bilat    Hiatal hernia     [place] (hard of hearing)     bilat HA    Hypertension     Murmur, heart     SOB (shortness of breath) on exertion     Wears glasses      Past Surgical History: Procedure Laterality Date    APPENDECTOMY      age 15   Mollie Sizer EYE SURGERY Left     Cataract with IOL    JOINT REPLACEMENT Left 2010    knee    OR REMV CATARACT EXTRACAP,INSERT LENS Right 8/8/2016    Procedure: EXTRACTION EXTRACAPSULAR CATARACT PHACO INTRAOCULAR LENS (IOL); Surgeon: Myrna Thorne MD;  Location: [facility] MAIN OR;  Service: Ophthalmology    RESECTION TONSIL RADICAL      SKIN BIOPSY      face, back, right leg    SPLENECTOMY, TOTAL       History   Alcohol Use    0 6 oz/week    1 Glasses of wine per week     Comment: rarely     History   Drug Use No     History   Smoking Status    Never Smoker   Smokeless Tobacco    Never Used     Family History: non-contributory    Meds/Allergies   all current active meds have been reviewed  No Known Allergies    Objective   Vitals: Blood pressure (!) 176/97, pulse 90, temperature 98 4 °F (36 9 °C), temperature source Oral, resp  rate 18, weight 76 3 kg (168 lb 3 4 oz), SpO2 90 %  , Body mass index is 31 78 kg/m² , Orthostatic Blood Pressures    Flowsheet Row Most Recent Value   Blood Pressure   176/97 filed at 04/08/2018 0741   Patient Position - Orthostatic VS  Lying filed at 04/08/2018 0741            Intake/Output Summary (Last 24 hours) at 04/08/18 0905  Last data filed at 04/08/18 0856   Gross per 24 hour   Intake              360 ml   Output              700 ml   Net             -340 ml               Physical Exam:  GEN: Leon Giron appears well, alert and oriented x 3, pleasant and cooperative   HEENT: pupils equal, round, and reactive to light; extraocular muscles intact  NECK: supple, no carotid bruits   HEART: regular rhythm, normal S1 and S2, no murmurs, clicks, gallops or rubs   LUNGS: clear to auscultation bilaterally; no wheezes, rales, or rhonchi   ABDOMEN: normal bowel sounds, soft, no tenderness, no distention  EXTREMITIES: peripheral pulses normal; no clubbing, cyanosis, or edema  NEURO: no focal findings   SKIN: normal without suspicious lesions on exposed skin    Lab Results:   Admission on 04/07/2018   Component Date Value Ref Range Status    WBC 04/07/2018 11 14* 4 31 - 10 16 Thousand/uL Final    RBC 04/07/2018 5 00  3 81 - 5 12 Million/uL Final    Hemoglobin 04/07/2018 12 7  11 5 - 15 4 g/dL Final    Hematocrit 04/07/2018 41 5  34 8 - 46 1 % Final    MCV 04/07/2018 83  82 - 98 fL Final    MCH 04/07/2018 25 4* 26 8 - 34 3 pg Final    MCHC 04/07/2018 30 6* 31 4 - 37 4 g/dL Final    RDW 04/07/2018 17 2* 11 6 - 15 1 % Final    MPV 04/07/2018 11 1  8 9 - 12 7 fL Final    Platelets 90/20/4953 404* 149 - 390 Thousands/uL Final    Protime 04/07/2018 12 7  12 1 - 14 4 seconds Final    INR 04/07/2018 0 93  0 86 - 1 16 Final    PTT 04/07/2018 28  23 - 35 seconds Final    Sodium 04/07/2018 143  136 - 145 mmol/L Final    Potassium 04/07/2018 3 3* 3 5 - 5 3 mmol/L Final    Chloride 04/07/2018 102  100 - 108 mmol/L Final    CO2 04/07/2018 32  21 - 32 mmol/L Final    Anion Gap 04/07/2018 9  4 - 13 mmol/L Final    BUN 04/07/2018 17  5 - 25 mg/dL Final    Creatinine 04/07/2018 0 88  0 60 - 1 30 mg/dL Final    Glucose 04/07/2018 84  65 - 140 mg/dL Final    Calcium 04/07/2018 10 0  8 3 - 10 1 mg/dL Final    AST 04/07/2018 20  5 - 45 U/L Final    ALT 04/07/2018 27  12 - 78 U/L Final    Alkaline Phosphatase 04/07/2018 146* 46 - 116 U/L Final    Total Protein 04/07/2018 7 5  6 4 - 8 2 g/dL Final    Albumin 04/07/2018 3 7  3 5 - 5 0 g/dL Final    Total Bilirubin 04/07/2018 0 30  0 20 - 1 00 mg/dL Final    eGFR 04/07/2018 59  ml/min/1 73sq m Final    TSH 3RD GENERATON 04/07/2018 0 014* 0 358 - 3 740 uIU/mL Final    Magnesium 04/07/2018 1 8  1 6 - 2 6 mg/dL Final    Troponin I 04/07/2018 0 08* <=0 04 ng/mL Final    NT-proBNP 04/07/2018 967* <450 pg/mL Final    Segmented % 04/07/2018 74  43 - 75 % Final    Bands % 04/07/2018 1  0 - 8 % Final    Lymphocytes % 04/07/2018 12* 14 - 44 % Final    Monocytes % 04/07/2018 6  4 - 12 % Final    Eosinophils % 04/07/2018 3  0 - 6 % Final    Basophils % 04/07/2018 0  0 - 1 % Final    Atypical Lymphocytes % 04/07/2018 4* <=0 % Final    Absolute Neutrophils 04/07/2018 8 36* 1 85 - 7 62 Thousand/uL Final    Lymphocytes Absolute 04/07/2018 1 34  0 60 - 4 47 Thousand/uL Final    Monocytes Absolute 04/07/2018 0 67  0 00 - 1 22 Thousand/uL Final    Eosinophils Absolute 04/07/2018 0 33  0 00 - 0 40 Thousand/uL Final    Basophils Absolute 04/07/2018 0 00  0 00 - 0 10 Thousand/uL Final    Total Counted 04/07/2018 100   Final    Anisocytosis 04/07/2018 Present   Final    Poikilocytes 04/07/2018 Present   Final    Platelet Estimate 18/11/6573 Adequate  Adequate Final    Color, UA 04/07/2018 Yellow   Final    Clarity, UA 04/07/2018 Clear   Final    pH, UA 04/07/2018 7 0  4 5 - 8 0 Final    Leukocytes, UA 04/07/2018 Small* Negative Final    Nitrite, UA 04/07/2018 Negative  Negative Final    Protein, UA 04/07/2018 30 (1+)* Negative mg/dl Final    Glucose, UA 04/07/2018 Negative  Negative mg/dl Final    Ketones, UA 04/07/2018 Negative  Negative mg/dl Final    Urobilinogen, UA 04/07/2018 0 2  0 2, 1 0 E U /dl E U /dl Final    Bilirubin, UA 04/07/2018 Negative  Negative Final    Blood, UA 04/07/2018 Negative  Negative Final    Specific Gravity, UA 04/07/2018 1 015  1 003 - 1 030 Final    RBC, UA 04/07/2018 None Seen  None Seen, 0-5 /hpf Final    WBC, UA 04/07/2018 4-10* None Seen, 0-5, 5-55, 5-65 /hpf Final    Epithelial Cells 04/07/2018 Occasional  None Seen, Occasional /hpf Final    Bacteria, UA 04/07/2018 Innumerable* None Seen, Occasional /hpf Final    Troponin I 04/07/2018 0 09* <=0 04 ng/mL Final    Troponin I 04/07/2018 0 10* <=0 04 ng/mL Final    Platelets 28/99/7329 348  149 - 390 Thousands/uL Final    MPV 04/07/2018 10 7  8 9 - 12 7 fL Final    Troponin I 04/08/2018 0 09* <=0 04 ng/mL Final    Sodium 04/08/2018 144  136 - 145 mmol/L Final    Potassium 04/08/2018 2 8* 3 5 - 5 3 mmol/L Final    Chloride 04/08/2018 105  100 - 108 mmol/L Final    CO2 04/08/2018 31  21 - 32 mmol/L Final    Anion Gap 04/08/2018 8  4 - 13 mmol/L Final    BUN 04/08/2018 14  5 - 25 mg/dL Final    Creatinine 04/08/2018 0 78  0 60 - 1 30 mg/dL Final    Glucose 04/08/2018 95  65 - 140 mg/dL Final    Calcium 04/08/2018 9 3  8 3 - 10 1 mg/dL Final    eGFR 04/08/2018 69  ml/min/1 73sq m Final    Magnesium 04/08/2018 1 9  1 6 - 2 6 mg/dL Final    Ventricular Rate 04/07/2018 94  BPM Final    Atrial Rate 04/07/2018 98  BPM Final    IA Interval 04/07/2018 234  ms Final    QRSD Interval 04/07/2018 88  ms Final    QT Interval 04/07/2018 348  ms Final    QTC Interval 04/07/2018 436  ms Final    P Axis 04/07/2018 81  degrees Final    QRS Axis 04/07/2018 100  degrees Final    T Wave Axis 04/07/2018 46  degrees Final         Results from last 7 days  Lab Units 04/08/18  0010 04/07/18  2114 04/07/18  1724   TROPONIN I ng/mL 0 09* 0 10* 0 09*     Results from last 7 days  Lab Units 04/07/18  2114 04/07/18  1229   WBC Thousand/uL  --  11 14*   HEMOGLOBIN g/dL  --  12 7   HEMATOCRIT %  --  41 5   PLATELETS Thousands/uL 348 404*           Results from last 7 days  Lab Units 04/08/18  0448 04/07/18  1229   SODIUM mmol/L 144 143   POTASSIUM mmol/L 2 8* 3 3*   CHLORIDE mmol/L 105 102   CO2 mmol/L 31 32   BUN mg/dL 14 17   CREATININE mg/dL 0 78 0 88   CALCIUM mg/dL 9 3 10 0   TOTAL PROTEIN g/dL  --  7 5   BILIRUBIN TOTAL mg/dL  --  0 30   ALK PHOS U/L  --  146*   ALT U/L  --  27   AST U/L  --  20   GLUCOSE RANDOM mg/dL 95 84     Results from last 7 days  Lab Units 04/07/18  1229   INR  0 93         Imaging: I have personally reviewed pertinent reports  Echo: pending                Counseling / Coordination of Care  Total floor / unit time spent today 55 minutes  Greater than 50% of total time was spent with the patient and / or family counseling and / or coordination of care

## 2018-04-08 NOTE — PLAN OF CARE
Problem: DISCHARGE PLANNING - CARE MANAGEMENT  Goal: Discharge to post-acute care or home with appropriate resources  INTERVENTIONS:  - Conduct assessment to determine patient/family and health care team treatment goals, and need for post-acute services based on payer coverage, community resources, and patient preferences, and barriers to discharge  - Address psychosocial, clinical, and financial barriers to discharge as identified in assessment in conjunction with the patient/family and health care team  - Arrange appropriate level of post-acute services according to patient's   needs and preference and payer coverage in collaboration with the physician and health care team  - Communicate with and update the patient/family, physician, and health care team regarding progress on the discharge plan  - Arrange appropriate transportation to post-acute venues  Outcome: Progressing  LOS 1  Not a bundle; Not a readmission  CM reviewed discharge planning process including the following: identifying caregivers at home, preference for d/c planning needs, [facility] Meds to Bed program, availability of treatment team to discuss questions or concerns patient and/or family may have regarding diagnosis, plan of care, old or new medications and discharge planning   CM will continue to follow for care coordination and update assessment as necessary  CM name and role reviewed and Discharge Checklist provided  Encouraged patient and caregiver to review prior to discharge  Pt questioned if she is able to obtain a script for a quad cane however Cm explained that pt's are only allowed one piece of DME every 5 years and it is best to purchase a quad cane from [employer] which are approximately $15 to $20  [facility]  Cm explained they have them there as well  Cm will follow in the event there are additional needs prior to DC

## 2018-04-08 NOTE — CASE MANAGEMENT
Initial Clinical Review    Admission: Date/Time/Statement: 4/7/18 @ 1453     Orders Placed This Encounter   Procedures    Inpatient Admission (expected length of stay for this patient is greater than two midnights)     Standing Status:   Standing     Number of Occurrences:   1     Order Specific Question:   Admitting Physician     Answer:   Angeles Miranda     Order Specific Question:   Level of Care     Answer:   Med Surg [16]     Order Specific Question:   Estimated length of stay     Answer:   More than 2 Midnights     Order Specific Question:   Certification     Answer:   I certify that inpatient services are medically necessary for this patient for a duration of greater than two midnights  See H&P and MD Progress Notes for additional information about the patient's course of treatment  ED: Date/Time/Mode of Arrival:   ED Arrival Information     Expected Arrival Acuity Means of Arrival Escorted By Service Admission Type    4/7/2018 11:50 4/7/2018 11:58 Emergent Walk-In Family Member General Medicine Emergency    Arrival Complaint    HYPERTENSION          Chief Complaint:   Chief Complaint   Patient presents with    Hypertension     pt reports recent medication change lisinipril where they double her dose, Pt states she checked her PB and it was high, being unable to get ahold of her PCP so they came here  Pt has 02 sat of 83 ra       History of Illness: This 49-year-old white female presents emergency room with an elevated blood pressure  She states she took her pressure this morning because she was having a slight dull headache and it was over 200  She was just recently changed from Norvasc to lisinopril  She was initially placed on 25 mg lisinopril that was recently increased to 50 mg  She took her  Medication this morning  She denies any chest pain or shortness of breath  She denies any shortness of breath with activity    She does complain of swelling in her ankles but no specific weight gain  She did present with a pulse ox of 82%  She was immediately put on  Oxygen by nasal cannula and now her saturations are 97% on 2 L  She denies any black tarry stools or bright red blood per rectum  She complains of urinary frequency that is from her Lasix medication  Past medical history is positive for anxiety, arthritis, cancer- skin, acute coronary disease, hearing impairment requiring a hearing aid, hiatal hernia, hypertension, heart murmur, shortness of breath on exertion  ED Vital Signs:   ED Triage Vitals [04/07/18 1205]   Temperature Pulse Respirations Blood Pressure SpO2   (!) 97 3 °F (36 3 °C) 92 (!) 24 (!) 202/106 (!) 82 %      Temp Source Heart Rate Source Patient Position - Orthostatic VS BP Location FiO2 (%)   Oral Monitor Lying Right arm --      Pain Score       No Pain        Wt Readings from Last 1 Encounters:   04/07/18 76 3 kg (168 lb 3 4 oz)       Vital Signs (abnormal):   Date and Time Temp Pulse SpO2 Resp BP   04/07/18 1757 -- 90 95 % 18 169/82   04/07/18 1745 -- 86 95 % 18  176/88   04/07/18 1727 -- 87 92 % 18  198/98   04/07/18 1517 -- 84 96 % 18  200/98   04/07/18 1500 -- 80 95 % 18  199/104   04/07/18 1445 -- 82 95 % --  190/102   04/07/18 1441 -- 83 96 % 18  205/104   04/07/18 1346 -- 87 96 % 18  179/101   04/07/18 1337 -- 104 96 % 18  230/117   04/07/18 1230 -- 90 97 % --  206/105     Abnormal Labs/Diagnostic Test Results: NT-pro BNP 11 14, Troponin 0 08, Platelets 619, K 3 3, Alk Phos 146    CXR: Persistent cardiomegaly       EKG: Sinus rhythm with 1st degree A-V block with Premature supraventricular complexes     ED Treatment:   Medication Administration from 04/07/2018 1150 to 04/07/2018 1807       Date/Time Order Dose Route Action Action by Comments     04/07/2018 1337 labetalol (NORMODYNE) injection 10 mg 10 mg Intravenous Given Vandana Guevara RN      04/07/2018 1336 aspirin chewable tablet 324 mg 324 mg Oral Given Jeison Godoy RN      04/07/2018 0986 labetalol (NORMODYNE) injection 10 mg 10 mg Intravenous Given Dc Littlejohn RN      04/07/2018 2630 hydrALAZINE (APRESOLINE) injection 10 mg 10 mg Intravenous Given Dc Littlejohn RN         Physical Exam   Constitutional: She is oriented to person, place, and time  She appears well-developed and well-nourished  No distress  Cardiovascular: Normal rate and regular rhythm  Murmur heard  Pulmonary/Chest: Effort normal and breath sounds normal  No stridor  Musculoskeletal: She exhibits edema  She exhibits no tenderness  Past Medical/Surgical History: Active Ambulatory Problems     Diagnosis Date Noted    No Active Ambulatory Problems     Resolved Ambulatory Problems     Diagnosis Date Noted    No Resolved Ambulatory Problems     Past Medical History:   Diagnosis Date    Anxiety     Arthritis     Cancer ([facility] [address] )     Coronary artery disease     Hearing aid worn     Hiatal hernia     [place] (hard of hearing)     Hypertension     Murmur, heart     SOB (shortness of breath) on exertion     Wears glasses        Admitting Diagnosis: Hypertension [I10]  Hypoxia [R09 02]  Elevated troponin [R74 8]  Hypertensive urgency [I16 0]  Low serum thyroid stimulating hormone (TSH) [R94 6]    Age/Sex: 80 y o  female    Assessment/Plan:     Hospital Problem List:      Principal Problem:    Hypertensive urgency  Active Problems:    Elevated troponin    Anxiety    Arthritis    Hypokalemia        Plan for the Primary Problem(s):     · Hypertensive urgency - will provide her with IV hydralazine, continue lisinopril and triamterene hydrochlorothiazide, consult Cardiology  · Elevated troponins likely due to 1 above check troponins        Plan for Additional Problems:   · Hypokalemia replete monitor  · Arthritis Tylenol p r n    · Anxiety     The patient's daughter is at bedside, the patient and the daughter agreeable with ongoing management as outlined as well as consult requested      VTE Prophylaxis: Enoxaparin (Lovenox)  / sequential compression device   Code Status:  Full Code  POLST: There is no POLST form on file for this patient (pre-hospital)     Anticipated Length of Stay:  Patient will be admitted on an Inpatient basis with an anticipated length of stay of  More than 2 midnights     Justification for Hospital Stay:  Hypertensive urgency, elevated troponin requiring close monitoring further evaluation as outlined        Admission Orders:  Inpatient/Tele  Continuous Cardiac Monitoring  Serial Cardiac Enzymes q3h x 3  Consult Cardiology r/e hypertension, elevated troponin   Bilateral Sequential Compression Device  Echocardiogram  Scheduled Meds:   Current Facility-Administered Medications:  amLODIPine 2 5 mg Oral Daily   aspirin 81 mg Oral QAM   b complex-vitamin C-folic acid 1 capsule Oral BID   Cranberry 12,600 mg Oral BID   docusate sodium 100 mg Oral BID   enoxaparin 40 mg Subcutaneous Daily   lisinopril 10 mg Oral BID   multivitamin-minerals 1 tablet Oral QAM   PRESERVISION AREDS 1 capsule Oral BID   triamterene-hydrochlorothiazide 1 tablet Oral Daily   venlafaxine 75 mg Oral Daily     IV Hydralazine 10 mg x 4 thus far  IV Benadryl 25 mg x 2 thus far  Xanax 0 25 mg po x 1 thus far

## 2018-04-09 VITALS
BODY MASS INDEX: 31.78 KG/M2 | RESPIRATION RATE: 20 BRPM | SYSTOLIC BLOOD PRESSURE: 148 MMHG | WEIGHT: 168.21 LBS | HEART RATE: 94 BPM | TEMPERATURE: 98.8 F | DIASTOLIC BLOOD PRESSURE: 79 MMHG | OXYGEN SATURATION: 92 %

## 2018-04-09 PROBLEM — I21.4 NSTEMI (NON-ST ELEVATED MYOCARDIAL INFARCTION) (HCC): Status: ACTIVE | Noted: 2018-04-07

## 2018-04-09 LAB
ANION GAP SERPL CALCULATED.3IONS-SCNC: 7 MMOL/L (ref 4–13)
BUN SERPL-MCNC: 18 MG/DL (ref 5–25)
CALCIUM SERPL-MCNC: 10.1 MG/DL
CHLORIDE SERPL-SCNC: 103 MMOL/L (ref 100–108)
CO2 SERPL-SCNC: 31 MMOL/L (ref 21–32)
CREAT SERPL-MCNC: 0.89 MG/DL (ref 0.6–1.3)
GFR SERPL CREATININE-BSD FRML MDRD: 58 ML/MIN/1.73SQ M
GLUCOSE SERPL-MCNC: 111 MG/DL (ref 65–140)
POTASSIUM SERPL-SCNC: 3.4 MMOL/L (ref 3.5–5.3)
SODIUM SERPL-SCNC: 141 MMOL/L (ref 136–145)

## 2018-04-09 PROCEDURE — 80048 BASIC METABOLIC PNL TOTAL CA: CPT | Performed by: INTERNAL MEDICINE

## 2018-04-09 PROCEDURE — 99239 HOSP IP/OBS DSCHRG MGMT >30: CPT | Performed by: PHYSICIAN ASSISTANT

## 2018-04-09 RX ORDER — POTASSIUM CHLORIDE 750 MG/1
10 TABLET, EXTENDED RELEASE ORAL DAILY
Qty: 30 TABLET | Refills: 0 | Status: SHIPPED | OUTPATIENT
Start: 2018-04-10 | End: 2020-10-06 | Stop reason: CLARIF

## 2018-04-09 RX ORDER — MAGNESIUM SULFATE HEPTAHYDRATE 40 MG/ML
2 INJECTION, SOLUTION INTRAVENOUS ONCE
Status: COMPLETED | OUTPATIENT
Start: 2018-04-09 | End: 2018-04-09

## 2018-04-09 RX ORDER — IBUPROFEN 400 MG/1
400 TABLET ORAL ONCE
Status: COMPLETED | OUTPATIENT
Start: 2018-04-09 | End: 2018-04-09

## 2018-04-09 RX ORDER — LISINOPRIL 10 MG/1
10 TABLET ORAL 2 TIMES DAILY
Qty: 60 TABLET | Refills: 0 | Status: SHIPPED | OUTPATIENT
Start: 2018-04-09 | End: 2020-10-06 | Stop reason: ALTCHOICE

## 2018-04-09 RX ORDER — POTASSIUM CHLORIDE 750 MG/1
10 TABLET, EXTENDED RELEASE ORAL DAILY
Status: DISCONTINUED | OUTPATIENT
Start: 2018-04-09 | End: 2018-04-09 | Stop reason: HOSPADM

## 2018-04-09 RX ORDER — POTASSIUM CHLORIDE 20 MEQ/1
40 TABLET, EXTENDED RELEASE ORAL ONCE
Status: COMPLETED | OUTPATIENT
Start: 2018-04-09 | End: 2018-04-09

## 2018-04-09 RX ORDER — AMLODIPINE BESYLATE 2.5 MG/1
2.5 TABLET ORAL DAILY
Qty: 30 TABLET | Refills: 0 | Status: SHIPPED | OUTPATIENT
Start: 2018-04-09 | End: 2021-01-07 | Stop reason: DRUGHIGH

## 2018-04-09 RX ADMIN — NEPHROCAP 1 CAPSULE: 1 CAP ORAL at 07:49

## 2018-04-09 RX ADMIN — ASPIRIN 81 MG 81 MG: 81 TABLET ORAL at 07:49

## 2018-04-09 RX ADMIN — ACETAMINOPHEN 650 MG: 325 TABLET, FILM COATED ORAL at 10:05

## 2018-04-09 RX ADMIN — POTASSIUM CHLORIDE 10 MEQ: 750 TABLET, EXTENDED RELEASE ORAL at 14:08

## 2018-04-09 RX ADMIN — DOCUSATE SODIUM 100 MG: 100 CAPSULE, LIQUID FILLED ORAL at 07:48

## 2018-04-09 RX ADMIN — VENLAFAXINE HYDROCHLORIDE 75 MG: 37.5 CAPSULE, EXTENDED RELEASE ORAL at 07:50

## 2018-04-09 RX ADMIN — Medication 1 CAPSULE: at 07:54

## 2018-04-09 RX ADMIN — AMLODIPINE BESYLATE 2.5 MG: 2.5 TABLET ORAL at 07:48

## 2018-04-09 RX ADMIN — IBUPROFEN 400 MG: 400 TABLET, FILM COATED ORAL at 14:43

## 2018-04-09 RX ADMIN — MAGNESIUM SULFATE HEPTAHYDRATE 2 G: 40 INJECTION, SOLUTION INTRAVENOUS at 14:10

## 2018-04-09 RX ADMIN — LISINOPRIL 10 MG: 10 TABLET ORAL at 07:49

## 2018-04-09 RX ADMIN — Medication 1 TABLET: at 07:48

## 2018-04-09 RX ADMIN — POTASSIUM CHLORIDE 40 MEQ: 1500 TABLET, EXTENDED RELEASE ORAL at 14:43

## 2018-04-09 RX ADMIN — TRIAMTERENE AND HYDROCHLOROTHIAZIDE 1 TABLET: 37.5; 25 TABLET ORAL at 07:49

## 2018-04-09 RX ADMIN — ENOXAPARIN SODIUM 40 MG: 40 INJECTION SUBCUTANEOUS at 07:50

## 2018-04-09 NOTE — DISCHARGE SUMMARY
Discharge- Beronica Ballard 8/5/1930, 80 y o  female MRN: [id number]    Unit/Bed#: -01 Encounter: [id number]    Primary Care Provider: Мария Pruitt MD   Date and time admitted to hospital: 4/7/2018 12:09 PM        * Hypertensive urgency   Assessment & Plan    · Secondary to recent medication changes  · Cardiology input appreciated  · Meds adjusted with good results  · Continue Norvasc 2 5mg daily, lisinopril 10mg BID, and Maxide  · Follow up with Dr Goyo Hu  · Patient did have facial swelling with the administration of hydralazine  The medication was stopped and symptoms treated with Benadryl        NSTEMI (non-ST elevated myocardial infarction) [facility])   Assessment & Plan    · Type 2 secondary to demand from uncontrolled HTN  · Stable  · Patient chest pain free        Hypokalemia   Assessment & Plan    · Likely secondary to HCTZ use  · Replaced  · Discharge home on KCL 10meq daily  · Follow up with Dr Lion Sharp    · Supportive care with tylenol prn              Resolved Problems  Date Reviewed: 4/9/2018    None          Consultations During Hospital Stay:  · Dr Maxwell Hams    Procedures Performed:     · CT head-no acute intracranial abnormality  · Chest x-ray-persistent cardiomegaly    Significant Findings / Test Results:     · None    Incidental Findings:   · None     Test Results Pending at Discharge (will require follow up): · None     Outpatient Tests Requested:  · Would recommend a BMP in 1 week    Complications:  None    Reason for Admission:  Elevated blood pressure    Hospital Course:     Beronica Ballard is a 80 y o  female patient who originally presented to the hospital on 4/7/2018 due to elevated blood pressures  Patient's blood pressures on admission for as high as 230/117  Patient recently had her outpatient medications adjusted  Her amlodipine was discontinued and she was started on lisinopril secondary to lower extremity edema   Patient now presenting with uncontrolled blood pressures  Patient was seen in consultation by Cardiology  Her lisinopril was increased to 10 mg twice a day  A lower dose of amlodipine was added back of 2 5 mg daily  Patient's blood pressures improved greatly on this medication regimen  Her blood pressure is now ranging between 522-619 systolic we  Patient is also on Maxzide which she will continue  Patient was noted to be hypokalemic requiring replacement  Patient states she was recently taken off her potassium supplement and encourage to get her potassium supplementation via food  Patient's potassium was 2 8, therefore daily potassium supplementation will be resumed  I patient will need to follow up with Dr Tony Jimenez as an outpatient  Please see above list of diagnoses and related plan for additional information  Condition at Discharge: good     Discharge Day Visit / Exam:     Subjective:  Right knee is sore from her chronic arthritis  Vitals: Blood Pressure: (!) 190/98 (04/09/18 1273)  Pulse: 87 (04/09/18 0633)  Temperature: 98 3 °F (36 8 °C) (04/09/18 0633)  Temp Source: Oral (04/09/18 3381)  Respirations: 18 (04/09/18 4273)  Weight - Scale: 76 3 kg (168 lb 3 4 oz) (04/07/18 1823)  SpO2: 92 % (04/09/18 1300)  Exam:   Physical Exam   Constitutional: She is oriented to person, place, and time  She appears well-developed and well-nourished  No distress  HENT:   Head: Normocephalic and atraumatic  Cardiovascular: Normal rate and regular rhythm  Exam reveals no friction rub  No murmur heard  Pulmonary/Chest: Effort normal and breath sounds normal  No respiratory distress  She has no wheezes  Abdominal: Soft  Bowel sounds are normal  She exhibits no distension  There is no tenderness  There is no rebound and no guarding  Musculoskeletal: She exhibits edema (trace bilateral edema)  Neurological: She is alert and oriented to person, place, and time  No cranial nerve deficit  Skin: Skin is warm and dry  No rash noted  Psychiatric: She has a normal mood and affect  Nursing note and vitals reviewed  Discussion with Family: daughter called with update    Discharge instructions/Information to patient and family:   See after visit summary for information provided to patient and family  Provisions for Follow-Up Care:  See after visit summary for information related to follow-up care and any pertinent home health orders  Disposition:     Home    For Discharges to [facility] SNF:   · Not Applicable to this Patient - Not Applicable to this Patient    Planned Readmission: none     Discharge Statement:  I spent 45 minutes discharging the patient  This time was spent on the day of discharge  I had direct contact with the patient on the day of discharge  Greater than 50% of the total time was spent examining patient, answering all patient questions, arranging and discussing plan of care with patient as well as directly providing post-discharge instructions  Additional time then spent on discharge activities  Discharge Medications:  See after visit summary for reconciled discharge medications provided to patient and family        ** Please Note: This note has been constructed using a voice recognition system **

## 2018-04-09 NOTE — ASSESSMENT & PLAN NOTE
· Likely secondary to HCTZ use  · Replaced  · Discharge home on KCL 10meq daily  · Follow up with Dr Tony Jimenez

## 2018-04-09 NOTE — PLAN OF CARE
Problem: Potential for Falls  Goal: Patient will remain free of falls  INTERVENTIONS:  - Assess patient frequently for physical needs  -  Identify cognitive and physical deficits and behaviors that affect risk of falls    -  [place] fall precautions as indicated by assessment   - Educate patient/family on patient safety including physical limitations  - Instruct patient to call for assistance with activity based on assessment  - Modify environment to reduce risk of injury  - Consider OT/PT consult to assist with strengthening/mobility   Outcome: Completed Date Met: 04/09/18

## 2018-04-09 NOTE — PROGRESS NOTES
Pt sitting up in bed watching TV, appears comfortable and in no signs of distress, no c/o at this time, denies any needs, agree with prior nurse assessment, call bell within reach, will continue to monitor

## 2018-04-09 NOTE — ASSESSMENT & PLAN NOTE
· Secondary to recent medication changes  · Cardiology input appreciated  · Meds adjusted with good results  · Continue Norvasc 2 5mg daily, lisinopril 10mg BID, and Maxide  · Follow up with Dr Tony Jimenez

## 2019-12-09 ENCOUNTER — TRANSCRIBE ORDERS (OUTPATIENT)
Dept: ADMINISTRATIVE | Facility: HOSPITAL | Age: 84
End: 2019-12-09

## 2019-12-09 DIAGNOSIS — I10 ESSENTIAL HYPERTENSION, MALIGNANT: Primary | ICD-10-CM

## 2019-12-09 DIAGNOSIS — E87.6 HYPOPOTASSEMIA: ICD-10-CM

## 2019-12-15 ENCOUNTER — HOSPITAL ENCOUNTER (OUTPATIENT)
Dept: ULTRASOUND IMAGING | Facility: HOSPITAL | Age: 84
Discharge: HOME | End: 2019-12-15
Payer: MEDICARE

## 2019-12-15 DIAGNOSIS — I10 ESSENTIAL HYPERTENSION, MALIGNANT: ICD-10-CM

## 2019-12-15 DIAGNOSIS — E87.6 HYPOPOTASSEMIA: ICD-10-CM

## 2019-12-15 PROCEDURE — 76770 US EXAM ABDO BACK WALL COMP: CPT

## 2020-01-14 ENCOUNTER — HOSPITAL ENCOUNTER (OUTPATIENT)
Dept: NON INVASIVE DIAGNOSTICS | Facility: CLINIC | Age: 85
Discharge: HOME | End: 2020-01-14
Payer: MEDICARE

## 2020-01-14 DIAGNOSIS — I10 ESSENTIAL HYPERTENSION, MALIGNANT: ICD-10-CM

## 2020-01-14 DIAGNOSIS — E87.6 HYPOPOTASSEMIA: ICD-10-CM

## 2020-01-14 PROCEDURE — 93975 VASCULAR STUDY: CPT | Performed by: SURGERY

## 2020-01-14 PROCEDURE — 93975 VASCULAR STUDY: CPT

## 2020-02-05 ENCOUNTER — HOSPITAL ENCOUNTER (EMERGENCY)
Facility: HOSPITAL | Age: 85
Discharge: HOME | End: 2020-02-05
Attending: EMERGENCY MEDICINE | Admitting: EMERGENCY MEDICINE
Payer: MEDICARE

## 2020-02-05 VITALS
TEMPERATURE: 99.2 F | OXYGEN SATURATION: 100 % | HEART RATE: 98 BPM | RESPIRATION RATE: 18 BRPM | DIASTOLIC BLOOD PRESSURE: 98 MMHG | SYSTOLIC BLOOD PRESSURE: 170 MMHG

## 2020-02-05 DIAGNOSIS — S09.90XA INJURY OF HEAD, INITIAL ENCOUNTER: ICD-10-CM
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
## 2020-02-05 PROCEDURE — 99283 EMERGENCY DEPT VISIT LOW MDM: CPT | Performed by: EMERGENCY MEDICINE

## 2020-02-05 PROCEDURE — 99283 EMERGENCY DEPT VISIT LOW MDM: CPT

## 2020-02-05 NOTE — ED NOTES
PT awake and alert, no distress noted  No other questions upon d/c       April Linda Michelle RN  02/05/20 4961

## 2020-02-05 NOTE — ED PROVIDER NOTES
History  Chief Complaint   Patient presents with    Fall     Pt states about 1 hr ago "I was on the elevator, realized I was on the wrong floor, went to turn, and down I went " No thinners  No LOC  Patient referred to this emergency department after she was at [facility] [employer] for an outpatient visit when she tripped in the elevator striking the left forehead area  She did not lose consciousness in currently denies headache or neck pain or visual complaints or long bone trauma  She was told that she should go to this emergency department by [facility]  Patient is not on blood thinners  She refuses syncope palpitations or dizziness  States she accidentally tripped  Denies belly or back pain  Denies chest pain sob  Patient is not on blood thinners  Prior to Admission Medications   Prescriptions Last Dose Informant Patient Reported? Taking? ALPRAZolam (XANAX) 0 25 mg tablet   Yes No   Sig: Take 0 25 mg by mouth 3 (three) times a day as needed for anxiety  B Complex Vitamins (B-COMPLEX/B-12 PO)   Yes No   Sig: Take 1,500 mg by mouth 2 (two) times a day  Cranberry 40240 MG CAPS   Yes No   Sig: Take 1 tablet by mouth 2 (two) times a day  Multiple Vitamins-Minerals (PRESERVISION AREDS PO)   Yes No   Sig: Take 1 tablet by mouth 2 (two) times a day  amLODIPine (NORVASC) 2 5 mg tablet   No No   Sig: Take 1 tablet (2 5 mg total) by mouth daily   aspirin 81 MG tablet   Yes No   Sig: Take 81 mg by mouth every morning  ibuprofen (MOTRIN) 200 mg tablet   Yes No   Sig: Take 200 mg by mouth every 6 (six) hours as needed for mild pain  lisinopril (ZESTRIL) 10 mg tablet   No No   Sig: Take 1 tablet (10 mg total) by mouth 2 (two) times a day   multivitamin (THERAGRAN) TABS   Yes No   Sig: Take 1 tablet by mouth every morning     potassium chloride (K-DUR,KLOR-CON) 10 mEq tablet   No No   Sig: Take 1 tablet (10 mEq total) by mouth daily   triamterene-hydrochlorothiazide (DYAZIDE) 37 5-25 mg per capsule   Yes No   Sig: Take 1 capsule by mouth once a week     venlafaxine (EFFEXOR) 75 mg tablet   Yes No   Sig: Take 75 mg by mouth daily with lunch  Facility-Administered Medications: None       Past Medical History:   Diagnosis Date    Anxiety     Arthritis     left knee    Cancer (HCC)     skin: face, nose and back (100+ sutures)    Coronary artery disease     Hearing aid worn     bilat    Hiatal hernia     [place] (hard of hearing)     bilat HA    Hypertension     Murmur, heart     SOB (shortness of breath) on exertion     Wears glasses        Past Surgical History:   Procedure Laterality Date    APPENDECTOMY      age 15   [facility] SURGERY Left     Cataract with IOL    JOINT REPLACEMENT Left 2010    knee    SC XCAPSL CTRC RMVL INSJ IO LENS PROSTH W/O ECP Right 8/8/2016    Procedure: EXTRACTION EXTRACAPSULAR CATARACT PHACO INTRAOCULAR LENS (IOL); Surgeon: Alli Cool MD;  Location: [facility] MAIN OR;  Service: Ophthalmology    RESECTION TONSIL RADICAL      SKIN BIOPSY      face, back, right leg    SPLENECTOMY, TOTAL         Family History   Problem Relation Age of Onset    Diabetes Mother     Heart disease Father         massive MI exp 72    Kidney disease Brother      I have reviewed and agree with the history as documented  Social History     Tobacco Use    Smoking status: Never Smoker    Smokeless tobacco: Never Used   Substance Use Topics    Alcohol use: Yes     Alcohol/week: 1 0 standard drinks     Types: 1 Glasses of wine per week     Comment: rarely    Drug use: No        Review of Systems   Constitutional: Negative  Negative for activity change, appetite change, chills, diaphoresis, fatigue and fever  HENT: Negative  Negative for dental problem, sinus pressure, sinus pain, trouble swallowing and voice change  Eyes: Negative  Negative for photophobia and visual disturbance  Respiratory: Negative    Negative for cough, chest tightness, shortness of breath, wheezing and stridor  Cardiovascular: Negative  Negative for chest pain, palpitations and leg swelling  Gastrointestinal: Negative  Negative for abdominal distention, abdominal pain, blood in stool, nausea, rectal pain and vomiting  Endocrine: Negative  Genitourinary: Negative for difficulty urinating, dysuria, flank pain, frequency, hematuria, vaginal bleeding, vaginal discharge and vaginal pain  Musculoskeletal: Negative  Negative for back pain, neck pain and neck stiffness  Skin: Positive for wound  Negative for rash  Allergic/Immunologic: Negative  Neurological: Negative  Negative for dizziness, tremors, seizures, syncope, facial asymmetry, speech difficulty, light-headedness, numbness and headaches  Hematological: Negative  Negative for adenopathy  Does not bruise/bleed easily  Psychiatric/Behavioral: Negative  Negative for agitation and confusion  Physical Exam  Physical Exam   Constitutional: She is oriented to person, place, and time  She appears well-developed and well-nourished  No distress  HENT:   Head: Normocephalic and atraumatic  Right Ear: External ear normal    Left Ear: External ear normal    Nose: Nose normal    Mouth/Throat: Oropharynx is clear and moist    Small abrasion and ecchymotic area above and lateral to the left eyebrow area  No bony step-off or hematoma  No active bleeding  No laceration   Eyes: Pupils are equal, round, and reactive to light  Conjunctivae and EOM are normal    Neck: Normal range of motion  Neck supple  Cardiovascular: Normal rate, regular rhythm, normal heart sounds and intact distal pulses  Pulmonary/Chest: Effort normal and breath sounds normal  No stridor  No respiratory distress  She has no wheezes  She has no rales  She exhibits no tenderness  Abdominal: Soft  Bowel sounds are normal  She exhibits no distension and no mass  There is no tenderness  There is no rebound and no guarding  No hernia     Musculoskeletal: Normal range of motion  She exhibits no edema, tenderness or deformity  Neurological: She is alert and oriented to person, place, and time  She has normal reflexes  She displays normal reflexes  No cranial nerve deficit or sensory deficit  She exhibits normal muscle tone  Coordination normal    Skin: Skin is warm and dry  Capillary refill takes less than 2 seconds  She is not diaphoretic  Psychiatric: She has a normal mood and affect  Her behavior is normal  Judgment and thought content normal    Nursing note and vitals reviewed  Vital Signs  ED Triage Vitals [02/05/20 1538]   Temperature Pulse Respirations Blood Pressure SpO2   99 2 °F (37 3 °C) 98 18 170/98 100 %      Temp Source Heart Rate Source Patient Position - Orthostatic VS BP Location FiO2 (%)   Oral Monitor Sitting Left arm --      Pain Score       No Pain           Vitals:    02/05/20 1538   BP: 170/98   Pulse: 98   Patient Position - Orthostatic VS: Sitting         Visual Acuity      ED Medications  Medications - No data to display    Diagnostic Studies  Results Reviewed     None                 No orders to display              Procedures  Procedures         ED Course  ED Course as of Feb 05 1754   Wed Feb 05, 2020   1712 Patient is stable for discharge  Patient refused analgesics  Discussed head injury signs and symptoms that would require return to the emergency department  Patient with normal ambulation in the emergency department                                  MDM      Disposition  Final diagnoses:   Fall, initial encounter   Injury of head, initial encounter     Time reflects when diagnosis was documented in both MDM as applicable and the Disposition within this note     Time User Action Codes Description Comment    2/5/2020  5:13 PM Brant Conway Add [W19  QKWX] Fall, initial encounter     2/5/2020  5:13 PM Dolly Dutton Add [S09 [id number]] Injury of head, initial encounter       ED Disposition     ED Disposition Condition Date/Time Comment Discharge Stable Wed Feb 5, 2020  5:13 PM [facility] discharge to home/self care  Follow-up Information     Follow up With Specialties Details Why Contact Info    Amie Greer MD Internal Medicine Schedule an appointment as soon as possible for a visit  As needed [address] [place] [address] [address] [facility]            Discharge Medication List as of 2/5/2020  5:14 PM      CONTINUE these medications which have NOT CHANGED    Details   ALPRAZolam (XANAX) 0 25 mg tablet Take 0 25 mg by mouth 3 (three) times a day as needed for anxiety  , Until Discontinued, Historical Med      amLODIPine (NORVASC) 2 5 mg tablet Take 1 tablet (2 5 mg total) by mouth daily, Starting Mon 4/9/2018, Print      aspirin 81 MG tablet Take 81 mg by mouth every morning , Until Discontinued, Historical Med      B Complex Vitamins (B-COMPLEX/B-12 PO) Take 1,500 mg by mouth 2 (two) times a day , Until Discontinued, Historical Med      Cranberry 96941 MG CAPS Take 1 tablet by mouth 2 (two) times a day , Until Discontinued, Historical Med      ibuprofen (MOTRIN) 200 mg tablet Take 200 mg by mouth every 6 (six) hours as needed for mild pain , Until Discontinued, Historical Med      lisinopril (ZESTRIL) 10 mg tablet Take 1 tablet (10 mg total) by mouth 2 (two) times a day, Starting Mon 4/9/2018, Print      Multiple Vitamins-Minerals (PRESERVISION AREDS PO) Take 1 tablet by mouth 2 (two) times a day , Until Discontinued, Historical Med      multivitamin (THERAGRAN) TABS Take 1 tablet by mouth every morning , Until Discontinued, Historical Med      potassium chloride (K-DUR,KLOR-CON) 10 mEq tablet Take 1 tablet (10 mEq total) by mouth daily, Starting Tue 4/10/2018, Print      triamterene-hydrochlorothiazide (DYAZIDE) 37 5-25 mg per capsule Take 1 capsule by mouth once a week  , Historical Med      venlafaxine (EFFEXOR) 75 mg tablet Take 75 mg by mouth daily with lunch , Until Discontinued, Historical Med No discharge procedures on file      ED Provider  Electronically Signed by           Paul Krause MD  02/05/20 4234

## 2020-05-12 ENCOUNTER — OFFICE VISIT (OUTPATIENT)
Dept: INTERNAL MEDICINE CLINIC | Facility: CLINIC | Age: 85
End: 2020-05-12
Payer: MEDICARE

## 2020-05-12 VITALS
HEIGHT: 61 IN | SYSTOLIC BLOOD PRESSURE: 148 MMHG | OXYGEN SATURATION: 92 % | BODY MASS INDEX: 27.58 KG/M2 | DIASTOLIC BLOOD PRESSURE: 80 MMHG | WEIGHT: 146.1 LBS | TEMPERATURE: 97.8 F | HEART RATE: 72 BPM

## 2020-05-12 DIAGNOSIS — N18.30 CKD (CHRONIC KIDNEY DISEASE) STAGE 3, GFR 30-59 ML/MIN (HCC): ICD-10-CM

## 2020-05-12 DIAGNOSIS — I10 ESSENTIAL HYPERTENSION: Primary | ICD-10-CM

## 2020-05-12 DIAGNOSIS — E05.90 HYPERTHYROIDISM: ICD-10-CM

## 2020-05-12 PROCEDURE — 3077F SYST BP >= 140 MM HG: CPT | Performed by: INTERNAL MEDICINE

## 2020-05-12 PROCEDURE — 3008F BODY MASS INDEX DOCD: CPT | Performed by: INTERNAL MEDICINE

## 2020-05-12 PROCEDURE — 99214 OFFICE O/P EST MOD 30 MIN: CPT | Performed by: INTERNAL MEDICINE

## 2020-05-12 PROCEDURE — 1036F TOBACCO NON-USER: CPT | Performed by: INTERNAL MEDICINE

## 2020-05-12 PROCEDURE — 3079F DIAST BP 80-89 MM HG: CPT | Performed by: INTERNAL MEDICINE

## 2020-05-12 PROCEDURE — 1160F RVW MEDS BY RX/DR IN RCRD: CPT | Performed by: INTERNAL MEDICINE

## 2020-05-12 RX ORDER — TITANIUM DIOXIDE, OCTINOXATE, ZINC OXIDE 4.61; 1.6; .78 G/40ML; G/40ML; G/40ML
CREAM TOPICAL
COMMUNITY
End: 2021-01-07 | Stop reason: SDUPTHER

## 2020-05-12 RX ORDER — LOSARTAN POTASSIUM 100 MG/1
1 TABLET ORAL
COMMUNITY
Start: 2019-08-21 | End: 2020-10-06 | Stop reason: ALTCHOICE

## 2020-05-12 RX ORDER — RANITIDINE 300 MG/1
300 TABLET ORAL
COMMUNITY
End: 2022-04-19 | Stop reason: CLARIF

## 2020-05-12 RX ORDER — VENLAFAXINE HYDROCHLORIDE 150 MG/1
1 CAPSULE, EXTENDED RELEASE ORAL
COMMUNITY
Start: 2019-10-18 | End: 2020-08-27 | Stop reason: DRUGHIGH

## 2020-05-12 RX ORDER — POTASSIUM CHLORIDE 1500 MG/1
TABLET, FILM COATED, EXTENDED RELEASE ORAL
COMMUNITY
Start: 2020-04-24 | End: 2020-10-06 | Stop reason: CLARIF

## 2020-05-12 RX ORDER — ACETAMINOPHEN 325 MG/1
2 TABLET ORAL EVERY 6 HOURS
COMMUNITY

## 2020-05-12 RX ORDER — AMLODIPINE BESYLATE 5 MG/1
TABLET ORAL
COMMUNITY
Start: 2020-05-10 | End: 2020-10-08 | Stop reason: SDUPTHER

## 2020-05-12 RX ORDER — DIPHENOXYLATE HYDROCHLORIDE AND ATROPINE SULFATE 2.5; .025 MG/1; MG/1
1 TABLET ORAL DAILY
COMMUNITY
End: 2022-04-19

## 2020-05-12 RX ORDER — CARVEDILOL 12.5 MG/1
12.5 TABLET ORAL
COMMUNITY
Start: 2019-10-03 | End: 2020-09-22 | Stop reason: SDUPTHER

## 2020-05-12 RX ORDER — METHIMAZOLE 5 MG/1
1 TABLET ORAL 2 TIMES WEEKLY
COMMUNITY
Start: 2020-05-10 | End: 2021-01-07 | Stop reason: SDUPTHER

## 2020-07-07 ENCOUNTER — OFFICE VISIT (OUTPATIENT)
Dept: INTERNAL MEDICINE CLINIC | Facility: CLINIC | Age: 85
End: 2020-07-07
Payer: MEDICARE

## 2020-07-07 VITALS
HEIGHT: 61 IN | HEART RATE: 71 BPM | SYSTOLIC BLOOD PRESSURE: 124 MMHG | WEIGHT: 144.3 LBS | BODY MASS INDEX: 27.24 KG/M2 | TEMPERATURE: 97.4 F | OXYGEN SATURATION: 94 % | DIASTOLIC BLOOD PRESSURE: 60 MMHG

## 2020-07-07 DIAGNOSIS — N18.30 CKD (CHRONIC KIDNEY DISEASE) STAGE 3, GFR 30-59 ML/MIN (HCC): ICD-10-CM

## 2020-07-07 DIAGNOSIS — F41.9 ANXIETY: ICD-10-CM

## 2020-07-07 DIAGNOSIS — E05.90 HYPERTHYROIDISM: ICD-10-CM

## 2020-07-07 DIAGNOSIS — I10 ESSENTIAL HYPERTENSION: Primary | ICD-10-CM

## 2020-07-07 PROCEDURE — 3008F BODY MASS INDEX DOCD: CPT | Performed by: INTERNAL MEDICINE

## 2020-07-07 PROCEDURE — 3074F SYST BP LT 130 MM HG: CPT | Performed by: INTERNAL MEDICINE

## 2020-07-07 PROCEDURE — 4040F PNEUMOC VAC/ADMIN/RCVD: CPT | Performed by: INTERNAL MEDICINE

## 2020-07-07 PROCEDURE — 1036F TOBACCO NON-USER: CPT | Performed by: INTERNAL MEDICINE

## 2020-07-07 PROCEDURE — 3078F DIAST BP <80 MM HG: CPT | Performed by: INTERNAL MEDICINE

## 2020-07-07 PROCEDURE — 1160F RVW MEDS BY RX/DR IN RCRD: CPT | Performed by: INTERNAL MEDICINE

## 2020-07-07 PROCEDURE — 99214 OFFICE O/P EST MOD 30 MIN: CPT | Performed by: INTERNAL MEDICINE

## 2020-07-07 RX ORDER — DESOXIMETASONE 2.5 MG/G
CREAM TOPICAL
COMMUNITY
Start: 2020-07-02 | End: 2022-05-17

## 2020-07-07 NOTE — PROGRESS NOTES
Assessment/Plan:         Problem List Items Addressed This Visit        Endocrine    Hyperthyroidism     Patient currently taking methimazole for hyperthyroidism and denies any indications of thyroid excess  TSH low: will reorder thyroid panel  Relevant Orders    TSH, 3rd generation with Free T4 reflex    T3       Cardiovascular and Mediastinum    Essential hypertension - Primary     BP upon presentation within appropriate parameters  Patient and daughter indicate that blood pressures are measured about 2x/week and are within normal limits: will continue current antihypertensive regimen and encourage to continue routine blood pressure and heart rate assessments at home  Labs (6/16) significant for K+ of 6 0: will hold potassium supplementation and /potassium containing products  Will obtain repeat BMP  Genitourinary    CKD (chronic kidney disease) stage 3, GFR 30-59 ml/min ([facility])     Peripheral edema present upon interview will continue to be managed with mechanical compression and diuretics  Will re-evaluate upon next visit  Relevant Orders    Basic metabolic panel       Other    Anxiety            Subjective:      Patient ID: Mcneil Gowers is a 80 y o  female  HPI    The following portions of the patient's history were reviewed and updated as appropriate:   She has a past medical history of Allergic, Anxiety, Arthritis, Cancer ([address] ), Chronic kidney disease, Coronary artery disease, Depression, Disease of thyroid gland, Edema, Generalized headaches, GERD (gastroesophageal reflux disease), Hiatal hernia, History of Holter monitoring, [place] (hard of hearing), Hypertension, Murmur, heart, Osteoporosis, Thyroid nodule, and Vision problems  ,  does not have any pertinent problems on file  ,   has a past surgical history that includes Splenectomy, total; Resection tonsil radical; Eye surgery (Left); Appendectomy; Joint replacement (Left, 2010);  Skin biopsy; and pr xcapsl ctrc rmvl insj io lens prosth w/o ecp (Right, 8/8/2016)  ,  family history includes Diabetes in her mother; Heart disease in her father; Hypertension in her daughter; Kidney disease in her brother; Ovarian cancer in her daughter  ,   reports that she has never smoked  She has never used smokeless tobacco  She reports that she drinks about 1 0 standard drinks of alcohol per week  She reports that she does not use drugs  ,  is allergic to colchicine; hydralazine; and tramadol     Current Outpatient Medications   Medication Sig Dispense Refill    acetaminophen (TYLENOL) 325 mg tablet Take 2 tablets by mouth every 6 (six) hours      ALPRAZolam (XANAX) 0 25 mg tablet Take 0 25 mg by mouth 3 (three) times a day as needed for anxiety   amLODIPine (NORVASC) 5 mg tablet       aspirin 81 MG tablet Take 81 mg by mouth every morning   B Complex Vitamins (B-COMPLEX/B-12 PO) Take 1,500 mg by mouth 2 (two) times a day   carvedilol (COREG) 12 5 mg tablet Take 12 5 mg by mouth      Cranberry 42571 MG CAPS Take 1 tablet by mouth 2 (two) times a day   cyanocobalamin (VITAMIN B-12) 100 MCG tablet Take 100 mcg by mouth daily      desoximetasone (TOPICORT) 0 25 % cream       losartan (COZAAR) 100 MG tablet 1 tablet      methimazole (TAPAZOLE) 5 mg tablet 1 tablet 2 (two) times a week      multivitamin (THERAGRAN) TABS Take 1 tablet by mouth daily      Potassium Chloride ER 20 MEQ TBCR Take 1 tablet(s) twice a day by oral route        ranitidine (ZANTAC) 300 MG tablet Take 300 mg by mouth daily at bedtime      triamterene-hydrochlorothiazide (DYAZIDE) 37 5-25 mg per capsule Take 1 capsule by mouth once a week        venlafaxine (EFFEXOR-XR) 150 mg 24 hr capsule 1 capsule      amLODIPine (NORVASC) 2 5 mg tablet Take 1 tablet (2 5 mg total) by mouth daily (Patient not taking: Reported on 7/7/2020) 30 tablet 0    aspirin 81 MG tablet Take 81 mg by mouth daily      Cranberry 400 MG CAPS Take by mouth      ibuprofen (MOTRIN) 200 mg tablet Take 200 mg by mouth every 6 (six) hours as needed for mild pain   lisinopril (ZESTRIL) 10 mg tablet Take 1 tablet (10 mg total) by mouth 2 (two) times a day (Patient not taking: Reported on 7/7/2020) 60 tablet 0    Multiple Vitamins-Minerals (PRESERVISION AREDS PO) Take 1 tablet by mouth 2 (two) times a day   multivitamin (THERAGRAN) TABS Take 1 tablet by mouth every morning   potassium chloride (K-DUR,KLOR-CON) 10 mEq tablet Take 1 tablet (10 mEq total) by mouth daily (Patient not taking: Reported on 7/7/2020) 30 tablet 0    venlafaxine (EFFEXOR) 75 mg tablet Take 75 mg by mouth daily with lunch  No current facility-administered medications for this visit  Review of Systems      Objective:  Vitals:    07/07/20 1423   BP: 124/60   Pulse: 71   Temp: (!) 97 4 °F (36 3 °C)   TempSrc: Tympanic   SpO2: 94%   Weight: 65 5 kg (144 lb 4 8 oz)   Height: 5' 1" (1 549 m)     Body mass index is 27 27 kg/m²       Physical Exam

## 2020-07-07 NOTE — ASSESSMENT & PLAN NOTE
Peripheral edema present upon interview will continue to be managed with mechanical compression and diuretics  Will re-evaluate upon next visit

## 2020-07-07 NOTE — ASSESSMENT & PLAN NOTE
Patient currently taking methimazole for hyperthyroidism and denies any indications of thyroid excess  TSH low: will reorder thyroid panel

## 2020-08-27 DIAGNOSIS — F41.9 ANXIETY: Primary | ICD-10-CM

## 2020-08-27 RX ORDER — VENLAFAXINE HYDROCHLORIDE 225 MG/1
225 TABLET, EXTENDED RELEASE ORAL
Qty: 90 TABLET | Refills: 1 | Status: SHIPPED | OUTPATIENT
Start: 2020-08-27 | End: 2020-10-06 | Stop reason: ALTCHOICE

## 2020-09-02 ENCOUNTER — TELEPHONE (OUTPATIENT)
Dept: FAMILY MEDICINE CLINIC | Facility: CLINIC | Age: 85
End: 2020-09-02

## 2020-09-02 NOTE — TELEPHONE ENCOUNTER
Patient called stating the increase of venlafaxine keeps her up at night  Per Dr Albert Beltrán she should restart on initial doseage

## 2020-09-14 ENCOUNTER — TELEPHONE (OUTPATIENT)
Dept: FAMILY MEDICINE CLINIC | Facility: CLINIC | Age: 85
End: 2020-09-14

## 2020-09-14 DIAGNOSIS — F41.9 ANXIETY: Primary | ICD-10-CM

## 2020-09-14 RX ORDER — ALPRAZOLAM 0.25 MG/1
0.25 TABLET ORAL 3 TIMES DAILY PRN
Qty: 60 TABLET | Refills: 4 | Status: SHIPPED | OUTPATIENT
Start: 2020-09-14 | End: 2021-03-28

## 2020-09-22 DIAGNOSIS — I10 ESSENTIAL HYPERTENSION: Primary | ICD-10-CM

## 2020-09-22 RX ORDER — CARVEDILOL 12.5 MG/1
12.5 TABLET ORAL 2 TIMES DAILY WITH MEALS
Qty: 180 TABLET | Refills: 1 | Status: SHIPPED | OUTPATIENT
Start: 2020-09-22 | End: 2021-03-25

## 2020-10-08 DIAGNOSIS — I10 ESSENTIAL HYPERTENSION: Primary | ICD-10-CM

## 2020-10-09 RX ORDER — AMLODIPINE BESYLATE 5 MG/1
5 TABLET ORAL 2 TIMES DAILY
Qty: 180 TABLET | Refills: 1 | Status: SHIPPED | OUTPATIENT
Start: 2020-10-09 | End: 2021-01-07 | Stop reason: DRUGHIGH

## 2020-11-05 DIAGNOSIS — I10 ESSENTIAL HYPERTENSION: Primary | ICD-10-CM

## 2020-11-05 RX ORDER — LOSARTAN POTASSIUM 50 MG/1
TABLET ORAL
Qty: 180 TABLET | Refills: 2 | Status: SHIPPED | OUTPATIENT
Start: 2020-11-05 | End: 2021-11-08

## 2020-11-05 RX ORDER — TRIAMTERENE AND HYDROCHLOROTHIAZIDE 37.5; 25 MG/1; MG/1
CAPSULE ORAL
Qty: 45 CAPSULE | Refills: 2 | Status: SHIPPED | OUTPATIENT
Start: 2020-11-05 | End: 2021-11-30

## 2021-01-07 ENCOUNTER — OFFICE VISIT (OUTPATIENT)
Dept: FAMILY MEDICINE CLINIC | Facility: CLINIC | Age: 86
End: 2021-01-07
Payer: MEDICARE

## 2021-01-07 VITALS
HEIGHT: 61 IN | TEMPERATURE: 98.7 F | DIASTOLIC BLOOD PRESSURE: 80 MMHG | SYSTOLIC BLOOD PRESSURE: 130 MMHG | HEART RATE: 85 BPM | RESPIRATION RATE: 16 BRPM | BODY MASS INDEX: 27.75 KG/M2 | OXYGEN SATURATION: 98 % | WEIGHT: 147 LBS

## 2021-01-07 DIAGNOSIS — E05.90 HYPERTHYROIDISM: ICD-10-CM

## 2021-01-07 DIAGNOSIS — I10 ESSENTIAL HYPERTENSION: Primary | ICD-10-CM

## 2021-01-07 DIAGNOSIS — F41.9 ANXIETY: ICD-10-CM

## 2021-01-07 DIAGNOSIS — N18.30 STAGE 3 CHRONIC KIDNEY DISEASE, UNSPECIFIED WHETHER STAGE 3A OR 3B CKD (HCC): ICD-10-CM

## 2021-01-07 PROCEDURE — 99214 OFFICE O/P EST MOD 30 MIN: CPT | Performed by: INTERNAL MEDICINE

## 2021-01-07 RX ORDER — METHIMAZOLE 5 MG/1
5 TABLET ORAL 2 TIMES WEEKLY
Qty: 24 TABLET | Refills: 3 | Status: SHIPPED | OUTPATIENT
Start: 2021-01-07 | End: 2021-02-03 | Stop reason: SDUPTHER

## 2021-01-07 RX ORDER — AMLODIPINE BESYLATE 5 MG/1
5 TABLET ORAL 2 TIMES DAILY
Qty: 180 TABLET | Refills: 1 | Status: SHIPPED | OUTPATIENT
Start: 2021-01-07 | End: 2021-09-27

## 2021-01-07 NOTE — PROGRESS NOTES
Assessment/Plan:         Problem List Items Addressed This Visit        Endocrine    Hyperthyroidism     tsh - pt has script  On methimezole 2x/week         Relevant Medications    methimazole (TAPAZOLE) 5 mg tablet       Cardiovascular and Mediastinum    Essential hypertension - Primary     bp controlled  Tolerating meds well         Relevant Medications    amLODIPine (NORVASC) 5 mg tablet       Genitourinary    CKD (chronic kidney disease) stage 3, GFR 30-59 ml/min     Lab Results   Component Value Date    EGFR 58 04/09/2018    EGFR 69 04/08/2018    EGFR 59 04/07/2018    CREATININE 0 89 04/09/2018    CREATININE 0 78 04/08/2018    CREATININE 0 88 04/07/2018   gfr 63  monitor            Other    Anxiety     Controlled  monitor           Other Visit Diagnoses     BMI 27 0-27 9,adult                Subjective:      Patient ID: Syed Workman is a 80 y o  female  1  htn-under control with current medications  Tolerating medicine well  No headache dizziness or lightheadedness  No orthopnea  No weakness or fatigue  No palpitations  No chest pain or shortness of breath  2  ckd-3-no increased edema  No anorexia  No shortness of breath  No nausea or vomiting  No urinary problem  3  Hyperthyroidism-on methimazole twice a week  No palpitations  No hair loss  No increased anxiety  No diarrhea  No heat or cold intolerance  4  Anxiety-under control with current medications  No suicidal or homicidal ideations  She has a trouble with sleeping  However if she uses melatonin and it does help a lot  Advised to use melatonin every night rather than p r n  Yessenia Serum       The following portions of the patient's history were reviewed and updated as appropriate:   She has a past medical history of Allergic, Anxiety, Arthritis, Cancer ([address] ), Chronic kidney disease, Coronary artery disease, Depression, Disease of thyroid gland, Edema, Generalized headaches, GERD (gastroesophageal reflux disease), Hiatal hernia, History of echocardiogram (02/2016), History of EKG (10/20/2017), History of Holter monitoring (12/22/2015), History of stress test (03/15/2016), [place] (hard of hearing), Hypertension, Macular degeneration, Murmur, heart, Osteoporosis, Subclinical hyperthyroidism, Thyroid nodule, and Vision problems  ,  does not have any pertinent problems on file  ,   has a past surgical history that includes Splenectomy, total; Resection tonsil radical; Eye surgery (Left); Appendectomy; Joint replacement (Left, 2010); Skin biopsy; and pr xcapsl ctrc rmvl insj io lens prosth w/o ecp (Right, 8/8/2016)  ,  family history includes Diabetes in her mother; Heart disease in her father; Hypertension in her daughter; Kidney disease in her brother; Ovarian cancer in her daughter  ,   reports that she has never smoked  She has never used smokeless tobacco  She reports current alcohol use of about 1 0 standard drinks of alcohol per week  She reports that she does not use drugs  ,  is allergic to colchicine; hydralazine; and tramadol     Current Outpatient Medications   Medication Sig Dispense Refill    acetaminophen (TYLENOL) 325 mg tablet Take 2 tablets by mouth every 6 (six) hours      ALPRAZolam (XANAX) 0 25 mg tablet Take 1 tablet (0 25 mg total) by mouth 3 (three) times a day as needed for anxiety 60 tablet 4    amLODIPine (NORVASC) 5 mg tablet Take 1 tablet (5 mg total) by mouth 2 (two) times a day 180 tablet 1    aspirin 81 MG tablet Take 81 mg by mouth every morning   B Complex Vitamins (B-COMPLEX/B-12 PO) Take 1,500 mg by mouth 2 (two) times a day   carvedilol (COREG) 12 5 mg tablet Take 1 tablet (12 5 mg total) by mouth 2 (two) times a day with meals 180 tablet 1    Cranberry 30773 MG CAPS Take 1 tablet by mouth 2 (two) times a day        cyanocobalamin (VITAMIN B-12) 100 MCG tablet Take 100 mcg by mouth daily      desoximetasone (TOPICORT) 0 25 % cream       diclofenac sodium (VOLTAREN) 1 % Apply 2 g topically 2 (two) times a day 1 Tube 5    losartan (COZAAR) 50 mg tablet take 2 tablets by mouth once daily 180 tablet 2    methimazole (TAPAZOLE) 5 mg tablet Take 1 tablet (5 mg total) by mouth 2 (two) times a week 24 tablet 3    triamterene-hydrochlorothiazide (DYAZIDE) 37 5-25 mg per capsule take 1 capsule by mouth ON MONDAY WEDNESDAY AND FRIDAY 45 capsule 2    venlafaxine (EFFEXOR-XR) 150 mg 24 hr capsule 1 capsule daily      Multiple Vitamins-Minerals (PRESERVISION AREDS PO) Take 1 tablet by mouth 2 (two) times a day   multivitamin (THERAGRAN) TABS Take 1 tablet by mouth every morning   multivitamin (THERAGRAN) TABS Take 1 tablet by mouth daily      ranitidine (ZANTAC) 300 MG tablet Take 300 mg by mouth daily at bedtime       No current facility-administered medications for this visit  Review of Systems   Constitutional: Negative for appetite change, chills, diaphoresis, fatigue and fever  HENT: Negative for congestion, drooling and sinus pain  Eyes: Negative for discharge and itching  Respiratory: Negative for cough, chest tightness and shortness of breath  Cardiovascular: Positive for leg swelling (sometimes)  Negative for chest pain and palpitations  Gastrointestinal: Negative  Endocrine: Negative for polyphagia and polyuria  Genitourinary: Negative for difficulty urinating, dysuria, frequency and urgency  Skin: Negative for pallor and rash  Allergic/Immunologic: Negative for food allergies  Neurological: Negative for dizziness, seizures, speech difficulty, light-headedness, numbness and headaches  Hematological: Negative for adenopathy  Does not bruise/bleed easily  Psychiatric/Behavioral: Positive for sleep disturbance  Negative for agitation, confusion, decreased concentration, dysphoric mood and suicidal ideas           Objective:  Vitals:    01/07/21 1614   BP: 130/80   BP Location: Left arm   Patient Position: Sitting   Cuff Size: Standard   Pulse: 85   Resp: 16   Temp: 98 7 °F (37 1 °C)   TempSrc: Temporal   SpO2: 98%   Weight: 66 7 kg (147 lb)   Height: 5' 1" (1 549 m)     Body mass index is 27 78 kg/m²  Physical Exam  Vitals signs and nursing note reviewed  Constitutional:       Appearance: She is well-developed  HENT:      Head: Atraumatic  Eyes:      General:         Right eye: No discharge  Left eye: No discharge  Neck:      Thyroid: No thyromegaly  Cardiovascular:      Rate and Rhythm: Normal rate and regular rhythm  Pulses: Normal pulses  Heart sounds: Normal heart sounds  Pulmonary:      Effort: Pulmonary effort is normal       Breath sounds: Normal breath sounds  No wheezing  Chest:      Chest wall: No tenderness  Abdominal:      General: Bowel sounds are normal       Palpations: Abdomen is soft  Tenderness: There is no abdominal tenderness  Musculoskeletal:         General: No tenderness  Right lower leg: Edema present  Left lower leg: Edema (dependent) present  Lymphadenopathy:      Cervical: No cervical adenopathy  Skin:     Findings: No erythema or rash  Neurological:      Mental Status: She is alert and oriented to person, place, and time  Psychiatric:         Mood and Affect: Mood normal          Behavior: Behavior normal          Judgment: Judgment normal          BMI Counseling: Body mass index is 27 78 kg/m²  The BMI is above normal  Nutrition recommendations include reducing portion sizes

## 2021-01-07 NOTE — PATIENT INSTRUCTIONS
Low Fat Diet   AMBULATORY CARE:   A low-fat diet  is an eating plan that is low in total fat, unhealthy fat, and cholesterol  You may need to follow a low-fat diet if you have trouble digesting or absorbing fat  You may also need to follow this diet if you have high cholesterol  You can also lower your cholesterol by increasing the amount of fiber in your diet  Soluble fiber is a type of fiber that helps to decrease cholesterol levels  Different types of fat in food:   · Limit unhealthy fats  A diet that is high in cholesterol, saturated fat, and trans fat may cause unhealthy cholesterol levels  Unhealthy cholesterol levels increase your risk of heart disease  ? Cholesterol:  Limit intake of cholesterol to less than 200 mg per day  Cholesterol is found in meat, eggs, and dairy  ? Saturated fat:  Limit saturated fat to less than 7% of your total daily calories  Ask your dietitian how many calories you need each day  Saturated fat is found in butter, cheese, ice cream, whole milk, and palm oil  Saturated fat is also found in meat, such as beef, pork, chicken skin, and processed meats  Processed meats include sausage, hot dogs, and bologna  ? Trans fat:  Avoid trans fat as much as possible  Trans fat is used in fried and baked foods  Foods that say trans fat free on the label may still have up to 0 5 grams of trans fat per serving  · Include healthy fats  Replace foods that are high in saturated and trans fat with foods high in healthy fats  This may help to decrease high cholesterol levels  ? Monounsaturated fats: These are found in avocados, nuts, and vegetable oils, such as olive, canola, and sunflower oil  ? Polyunsaturated fats: These can be found in vegetable oils, such as soybean or corn oil  Omega-3 fats can help to decrease the risk of heart disease  Omega-3 fats are found in fish, such as salmon, herring, trout, and tuna   Omega-3 fats can also be found in plant foods, such as walnuts, flaxseed, soybeans, and canola oil  Foods to limit or avoid:   · Grains:      ? Snacks that are made with partially hydrogenated oils, such as chips, regular crackers, and butter-flavored popcorn    ? High-fat baked goods, such as biscuits, croissants, doughnuts, pies, cookies, and pastries    · Dairy:      ? Whole milk, 2% milk, and yogurt and ice cream made with whole milk    ? Half and half creamer, heavy cream, and whipping cream    ? Cheese, cream cheese, and sour cream    · Meats and proteins:      ? High-fat cuts of meat (T-bone steak, regular hamburger, and ribs)    ? Fried meat, poultry (turkey and chicken), and fish    ? Poultry (chicken and turkey) with skin    ? Cold cuts (salami or bologna), hot dogs, rawls, and sausage    ? Whole eggs and egg yolks    · Vegetables and fruits with added fat:      ? Fried vegetables or vegetables in butter or high-fat sauces, such as cream or cheese sauces    ? Fried fruit or fruit served with butter or cream    · Fats:      ? Butter, stick margarine, and shortening    ? Coconut, palm oil, and palm kernel oil    Foods to include:   · Grains:      ? Whole-grain breads, cereals, pasta, and brown rice    ? Low-fat crackers and pretzels    · Vegetables and fruits:      ? Fresh, frozen, or canned vegetables (no salt or low-sodium)    ? Fresh, frozen, dried, or canned fruit (canned in light syrup or fruit juice)    ? Avocado    · Low-fat dairy products:      ? Nonfat (skim) or 1% milk    ? Nonfat or low-fat cheese, yogurt, and cottage cheese    · Meats and proteins:      ? Chicken or turkey with no skin    ? Baked or broiled fish    ? Lean beef and pork (loin, round, extra lean hamburger)    ? Beans and peas, unsalted nuts, soy products    ? Egg whites and substitutes    ? Seeds and nuts    · Fats:      ? Unsaturated oil, such as canola, olive, peanut, soybean, or sunflower oil    ? Soft or liquid margarine and vegetable oil spread    ?  Low-fat salad dressing    Other ways to decrease fat:   · Read food labels before you buy foods  Choose foods that have less than 30% of calories from fat  Choose low-fat or fat-free dairy products  Remember that fat free does not mean calorie free  These foods still contain calories, and too many calories can lead to weight gain  · Trim fat from meat and avoid fried food  Trim all visible fat from meat before you cook it  Remove the skin from poultry  Do not [employer] meat, fish, or poultry  Bake, roast, boil, or broil these foods instead  Avoid fried foods  Eat a baked potato instead of [place] fries  Steam vegetables instead of sautéing them in butter  · Add less fat to foods  Use imitation rawls bits on salads and baked potatoes instead of regular rawls bits  Use fat-free or low-fat salad dressings instead of regular dressings  Use low-fat or nonfat butter-flavored topping instead of regular butter or margarine on popcorn and other foods  Ways to decrease fat in recipes:  Replace high-fat ingredients with low-fat or nonfat ones  This may cause baked goods to be drier than usual  You may need to use nonfat cooking spray on pans to prevent food from sticking  You also may need to change the amount of other ingredients, such as water, in the recipe  Try the following:  · Use low-fat or light margarine instead of regular margarine or shortening  · Use lean ground turkey breast or chicken, or lean ground beef (less than 5% fat) instead of hamburger  · Add 1 teaspoon of canola oil to 8 ounces of skim milk instead of using cream or half and half  · Use grated zucchini, carrots, or apples in breads instead of coconut  · Use blenderized, low-fat cottage cheese, plain tofu, or low-fat ricotta cheese instead of cream cheese  · Use 1 egg white and 1 teaspoon of canola oil, or use ¼ cup (2 ounces) of fat-free egg substitute instead of a whole egg       · Replace half of the oil that is called for in a recipe with applesauce when you bake  Use 3 tablespoons of cocoa powder and 1 tablespoon of canola oil instead of a square of baking chocolate  How to increase fiber:  Eat enough high-fiber foods to get 20 to 30 grams of fiber every day  Slowly increase your fiber intake to avoid stomach cramps, gas, and other problems  · Eat 3 ounces of whole-grain foods each day  An ounce is about 1 slice of bread  Eat whole-grain breads, such as whole-wheat bread  Whole wheat, whole-wheat flour, or other whole grains should be listed as the first ingredient on the food label  Replace white flour with whole-grain flour or use half of each in recipes  Whole-grain flour is heavier than white flour, so you may have to add more yeast or baking powder  · Eat a high-fiber cereal for breakfast   Oatmeal is a good source of soluble fiber  Look for cereals that have bran or fiber in the name  Choose whole-grain products, such as brown rice, barley, and whole-wheat pasta  · Eat more beans, peas, and lentils  For example, add beans to soups or salads  Eat at least 5 cups of fruits and vegetables each day  Eat fruits and vegetables with the peel because the peel is high in fiber  © Copyright [address] Information is for End User's use only and may not be sold, redistributed or otherwise used for commercial purposes  All illustrations and images included in CareNotes® are the copyrighted property of [employer] D [employer] M [employer]  or [facility] Tamara Singh   The above information is an  only  It is not intended as medical advice for individual conditions or treatments  Talk to your doctor, nurse or pharmacist before following any medical regimen to see if it is safe and effective for you

## 2021-01-07 NOTE — ASSESSMENT & PLAN NOTE
Lab Results   Component Value Date    EGFR 58 04/09/2018    EGFR 69 04/08/2018    EGFR 59 04/07/2018    CREATININE 0 89 04/09/2018    CREATININE 0 78 04/08/2018    CREATININE 0 88 04/07/2018   gfr 63   monitor

## 2021-01-15 ENCOUNTER — TELEPHONE (OUTPATIENT)
Dept: FAMILY MEDICINE CLINIC | Facility: CLINIC | Age: 86
End: 2021-01-15

## 2021-01-15 NOTE — TELEPHONE ENCOUNTER
LM on FD VM @ 11:19 am, 12:00pm and 12:15 pm - can not get thru to get your message - can you call he back until you get her      Thanks

## 2021-01-15 NOTE — TELEPHONE ENCOUNTER
----- Message from Karina Olson MD sent at 1/13/2021 12:23 PM EST -----  Please call the patient regarding her abnormal result  tsh still off   Up methimezole to 5 days per week

## 2021-01-24 ENCOUNTER — IMMUNIZATIONS (OUTPATIENT)
Dept: FAMILY MEDICINE CLINIC | Facility: HOSPITAL | Age: 86
End: 2021-01-24

## 2021-01-24 DIAGNOSIS — Z23 ENCOUNTER FOR IMMUNIZATION: Primary | ICD-10-CM

## 2021-01-24 PROCEDURE — 0011A SARS-COV-2 / COVID-19 MRNA VACCINE (MODERNA) 100 MCG: CPT

## 2021-01-24 PROCEDURE — 91301 SARS-COV-2 / COVID-19 MRNA VACCINE (MODERNA) 100 MCG: CPT

## 2021-02-03 ENCOUNTER — TELEPHONE (OUTPATIENT)
Dept: FAMILY MEDICINE CLINIC | Facility: HOSPITAL | Age: 86
End: 2021-02-03

## 2021-02-03 DIAGNOSIS — E05.90 HYPERTHYROIDISM: ICD-10-CM

## 2021-02-03 RX ORDER — METHIMAZOLE 5 MG/1
TABLET ORAL
Qty: 60 TABLET | Refills: 3 | Status: SHIPPED | OUTPATIENT
Start: 2021-02-03 | End: 2022-02-05 | Stop reason: SDUPTHER

## 2021-02-03 NOTE — TELEPHONE ENCOUNTER
Chelsey called and stated that Liss[facility]   methimazole (TAPAZOLE) 5 mg tablet needs to be sent in to pharmacy stating 1 tab daily Monday thru Friday , she stated the one that was called in was for Monday and Thursday needs to be updated

## 2021-02-21 ENCOUNTER — IMMUNIZATIONS (OUTPATIENT)
Dept: FAMILY MEDICINE CLINIC | Facility: HOSPITAL | Age: 86
End: 2021-02-21

## 2021-02-21 DIAGNOSIS — Z23 ENCOUNTER FOR IMMUNIZATION: Primary | ICD-10-CM

## 2021-02-21 PROCEDURE — 91301 SARS-COV-2 / COVID-19 MRNA VACCINE (MODERNA) 100 MCG: CPT

## 2021-02-21 PROCEDURE — 0012A SARS-COV-2 / COVID-19 MRNA VACCINE (MODERNA) 100 MCG: CPT

## 2021-03-25 DIAGNOSIS — I10 ESSENTIAL HYPERTENSION: ICD-10-CM

## 2021-03-25 RX ORDER — CARVEDILOL 12.5 MG/1
TABLET ORAL
Qty: 180 TABLET | Refills: 1 | Status: SHIPPED | OUTPATIENT
Start: 2021-03-25 | End: 2021-04-05

## 2021-03-27 DIAGNOSIS — F41.9 ANXIETY: ICD-10-CM

## 2021-03-28 RX ORDER — ALPRAZOLAM 0.25 MG/1
TABLET ORAL
Qty: 60 TABLET | Refills: 2 | Status: SHIPPED | OUTPATIENT
Start: 2021-03-28 | End: 2021-11-01

## 2021-04-04 DIAGNOSIS — I10 ESSENTIAL HYPERTENSION: ICD-10-CM

## 2021-04-05 RX ORDER — CARVEDILOL 12.5 MG/1
TABLET ORAL
Qty: 180 TABLET | Refills: 1 | Status: SHIPPED | OUTPATIENT
Start: 2021-04-05 | End: 2022-02-05 | Stop reason: SDUPTHER

## 2021-04-08 ENCOUNTER — OFFICE VISIT (OUTPATIENT)
Dept: FAMILY MEDICINE CLINIC | Facility: CLINIC | Age: 86
End: 2021-04-08
Payer: MEDICARE

## 2021-04-08 VITALS
RESPIRATION RATE: 18 BRPM | SYSTOLIC BLOOD PRESSURE: 120 MMHG | WEIGHT: 146 LBS | BODY MASS INDEX: 27.56 KG/M2 | TEMPERATURE: 98 F | HEART RATE: 76 BPM | DIASTOLIC BLOOD PRESSURE: 72 MMHG | OXYGEN SATURATION: 92 % | HEIGHT: 61 IN

## 2021-04-08 DIAGNOSIS — I10 ESSENTIAL HYPERTENSION: Primary | ICD-10-CM

## 2021-04-08 DIAGNOSIS — Z00.00 MEDICARE ANNUAL WELLNESS VISIT, INITIAL: ICD-10-CM

## 2021-04-08 DIAGNOSIS — N18.31 STAGE 3A CHRONIC KIDNEY DISEASE (HCC): ICD-10-CM

## 2021-04-08 DIAGNOSIS — E05.90 HYPERTHYROIDISM: ICD-10-CM

## 2021-04-08 PROCEDURE — 99214 OFFICE O/P EST MOD 30 MIN: CPT | Performed by: INTERNAL MEDICINE

## 2021-04-08 PROCEDURE — G0439 PPPS, SUBSEQ VISIT: HCPCS | Performed by: INTERNAL MEDICINE

## 2021-04-08 PROCEDURE — 1123F ACP DISCUSS/DSCN MKR DOCD: CPT | Performed by: INTERNAL MEDICINE

## 2021-04-08 NOTE — ASSESSMENT & PLAN NOTE
Pressure has been under control  Tolerating medicine well  Continue current medications    Monitor BMP

## 2021-04-08 NOTE — ASSESSMENT & PLAN NOTE
Lab Results   Component Value Date    EGFR 58 04/09/2018    EGFR 69 04/08/2018    EGFR 59 04/07/2018    CREATININE 0 89 04/09/2018    CREATININE 0 78 04/08/2018    CREATININE 0 88 04/07/2018   bmp reviewed

## 2021-04-08 NOTE — PROGRESS NOTES
Assessment/Plan:         Problem List Items Addressed This Visit        Endocrine    Hyperthyroidism     tsh was <0 01  so methimezole was changed to 5x/week  Recheck tsh, t4  Cardiovascular and Mediastinum    Essential hypertension - Primary     Pressure has been under control  Tolerating medicine well  Continue current medications  Monitor BMP         Relevant Orders    CBC and differential    Comprehensive metabolic panel    TSH, 3rd generation with Free T4 reflex    Lipid panel       Genitourinary    CKD (chronic kidney disease) stage 3, GFR 30-59 ml/min     Lab Results   Component Value Date    EGFR 58 04/09/2018    EGFR 69 04/08/2018    EGFR 59 04/07/2018    CREATININE 0 89 04/09/2018    CREATININE 0 78 04/08/2018    CREATININE 0 88 04/07/2018   bmp reviewed            Other    Medicare annual wellness visit, initial            Subjective:      Patient ID: Jagdish Le is a 80 y o  female  1  htn- has been under control  Tolerating medicine well  No headache dizziness or lightheadedness  No syncope  No orthopnea  No chest pain or increased dyspnea  No increased edema  2  ckd-3-  Last GFR was around 68  It is better  No edema  No anorexia  No trouble urinating  3  Hyperthyroidism-  TSH level was less than 0 01  She is on methimazole  Frequency was adjusted  No palpitations  No diarrhea  No heat intolerance  No hair loss        The following portions of the patient's history were reviewed and updated as appropriate:   Past Medical History:  She has a past medical history of Allergic, Anxiety, Arthritis, Cancer ([address] ), Chronic kidney disease, Coronary artery disease, Depression, Disease of thyroid gland, Edema, Generalized headaches, GERD (gastroesophageal reflux disease), Hiatal hernia, History of echocardiogram (02/2016), History of EKG (10/20/2017), History of Holter monitoring (12/22/2015), History of stress test (03/15/2016), [place] (hard of hearing), Hypertension, Macular degeneration, Murmur, heart, Osteoporosis, Subclinical hyperthyroidism, Thyroid nodule, and Vision problems  ,  _______________________________________________________________________  Medical Problems:  does not have any pertinent problems on file ,  _______________________________________________________________________  Past Surgical History:   has a past surgical history that includes Splenectomy, total; Resection tonsil radical; Eye surgery (Left); Appendectomy; Joint replacement (Left, 2010); Skin biopsy; and pr xcapsl ctrc rmvl insj io lens prosth w/o ecp (Right, 8/8/2016)  ,  _______________________________________________________________________  Family History:  family history includes Diabetes in her mother; Heart disease in her father; Hypertension in her daughter; Kidney disease in her brother; Ovarian cancer in her daughter ,  _______________________________________________________________________  Social History:   reports that she has never smoked  She has never used smokeless tobacco  She reports current alcohol use of about 1 0 standard drinks of alcohol per week  She reports that she does not use drugs  ,  _______________________________________________________________________  Allergies:  is allergic to colchicine; hydralazine; and tramadol     _______________________________________________________________________  Current Outpatient Medications   Medication Sig Dispense Refill    acetaminophen (TYLENOL) 325 mg tablet Take 2 tablets by mouth every 6 (six) hours      ALPRAZolam (XANAX) 0 25 mg tablet take 1 tablet by mouth three times a day if needed for anxiety 60 tablet 2    amLODIPine (NORVASC) 5 mg tablet Take 1 tablet (5 mg total) by mouth 2 (two) times a day 180 tablet 1    aspirin 81 MG tablet Take 81 mg by mouth every morning   B Complex Vitamins (B-COMPLEX/B-12 PO) Take 1,500 mg by mouth 2 (two) times a day        carvedilol (COREG) 12 5 mg tablet take 1 tablet by mouth twice a day with meals 180 tablet 1    Cranberry 34053 MG CAPS Take 1 tablet by mouth 2 (two) times a day   cyanocobalamin (VITAMIN B-12) 100 MCG tablet Take 100 mcg by mouth daily      desoximetasone (TOPICORT) 0 25 % cream       diclofenac sodium (VOLTAREN) 1 % Apply 2 g topically 2 (two) times a day (Patient not taking: Reported on 4/8/2021) 1 Tube 5    losartan (COZAAR) 50 mg tablet take 2 tablets by mouth once daily 180 tablet 2    methimazole (TAPAZOLE) 5 mg tablet 1 po Monday to fridays od  Skip on saturdays and sundays 60 tablet 3    Multiple Vitamins-Minerals (PRESERVISION AREDS PO) Take 1 tablet by mouth 2 (two) times a day   multivitamin (THERAGRAN) TABS Take 1 tablet by mouth every morning   multivitamin (THERAGRAN) TABS Take 1 tablet by mouth daily      ranitidine (ZANTAC) 300 MG tablet Take 300 mg by mouth daily at bedtime      triamterene-hydrochlorothiazide (DYAZIDE) 37 5-25 mg per capsule take 1 capsule by mouth ON MONDAY [facility] AND FRIDAY 45 capsule 2    venlafaxine (EFFEXOR-XR) 150 mg 24 hr capsule 1 capsule daily       No current facility-administered medications for this visit       _______________________________________________________________________  Review of Systems   Constitutional: Negative for appetite change, chills, diaphoresis, fatigue and fever  HENT: Negative for congestion, drooling and sinus pain  Eyes: Negative for discharge and itching  Respiratory: Negative for cough, chest tightness and shortness of breath  Cardiovascular: Negative for chest pain, palpitations and leg swelling  Gastrointestinal: Negative  Endocrine: Negative for polyphagia and polyuria  Genitourinary: Negative for difficulty urinating, dysuria, frequency and urgency  Musculoskeletal: Positive for gait problem  Skin: Negative for pallor and rash  Allergic/Immunologic: Negative for food allergies     Neurological: Negative for dizziness, seizures, speech difficulty, light-headedness, numbness and headaches  Hematological: Negative for adenopathy  Does not bruise/bleed easily  Psychiatric/Behavioral: Negative for agitation, confusion, decreased concentration, dysphoric mood, sleep disturbance and suicidal ideas  Objective:  Vitals:    04/08/21 1652   BP: 120/72   BP Location: Left arm   Patient Position: Sitting   Cuff Size: Standard   Pulse: 76   Resp: 18   Temp: 98 °F (36 7 °C)   TempSrc: Temporal   SpO2: 92%   Weight: 66 2 kg (146 lb)   Height: 5' 1" (1 549 m)     Body mass index is 27 59 kg/m²  Physical Exam  Vitals signs and nursing note reviewed  Constitutional:       Appearance: She is well-developed  HENT:      Head: Atraumatic  Eyes:      General:         Right eye: No discharge  Left eye: No discharge  Neck:      Musculoskeletal: Neck supple  Thyroid: No thyromegaly  Cardiovascular:      Rate and Rhythm: Normal rate and regular rhythm  Pulses: Normal pulses  Heart sounds: Normal heart sounds  Pulmonary:      Effort: Pulmonary effort is normal       Breath sounds: Normal breath sounds  No wheezing  Chest:      Chest wall: No tenderness  Abdominal:      General: Bowel sounds are normal  There is no distension  Palpations: Abdomen is soft  Tenderness: There is no abdominal tenderness  Musculoskeletal:         General: No tenderness  Right lower leg: No edema  Left lower leg: No edema  Lymphadenopathy:      Cervical: No cervical adenopathy  Skin:     Findings: No erythema  Neurological:      Mental Status: She is alert and oriented to person, place, and time     Psychiatric:         Mood and Affect: Mood normal          Behavior: Behavior normal          Judgment: Judgment normal

## 2021-04-08 NOTE — PROGRESS NOTES
Assessment and Plan:     Problem List Items Addressed This Visit        Other    Medicare annual wellness visit, initial - Primary          Falls Plan of Care: balance, strength, and gait training instructions were provided  Preventive health issues were discussed with patient, and age appropriate screening tests were ordered as noted in patient's After Visit Summary  Personalized health advice and appropriate referrals for health education or preventive services given if needed, as noted in patient's After Visit Summary  History of Present Illness:     Patient presents for Medicare Annual Wellness visit    Patient Care Team:  Juan Miguel Gutierrez MD as PCP - General     Problem List:     Patient Active Problem List   Diagnosis    Hypertensive urgency    Anxiety    Arthritis    Hypokalemia    Essential hypertension    CKD (chronic kidney disease) stage 3, GFR 30-59 ml/min    Hyperthyroidism    Medicare annual wellness visit, initial      Past Medical and Surgical History:     Past Medical History:   Diagnosis Date    Allergic     Anxiety     Arthritis     left knee    Cancer ([facility] [address] )     skin: face, nose and back (100+ sutures)    Chronic kidney disease     stage 2    Coronary artery disease     Depression     Disease of thyroid gland     Edema     Per [place]     Generalized headaches     Per [place]     GERD (gastroesophageal reflux disease)     Per [place]     Hiatal hernia     History of echocardiogram 02/2016    Stress Echo 3/15/16- Normal stress echo with no echocardiographic evidence of myocardial infraction or myocardial ischemia  This was technically difficult study  Echo 2/9/2016- Mild LVH  EF of 50-55%  LV diastolic noncompliance  Mitral annular calcification with 2+ MR  Normal opening of the cardiac valves    History of EKG 10/20/2017     Normal sinus rhythm, possible left atrial enlargement  Borderline ECG  EKG 7/28/17- Sinus rhytm with 1st degree AV block   Cannot rule out inferior infarct, age undetermined  Abnormal ECG  EKG 1/13/17- Normal sinus rhythm, nonspecific T wave abnormality  Abnormal ECG  EKG 2/26/16- Sinus rhythm with 1st degree AV block  Possible left atrial enlargement  Cannot rule out inferior infarct, age Adonis Barr History of Holter monitoring 12/22/2015    The baseline rhythm was of sinus origin with an average rate of 72bpm, (range: 59-98bpm)  There were frequant single uniform PVCs (averaging over 1600 beats per hour)  There were a few episodes of asymptomatic sinus arrhythmia with sinus bradycardia  There were a few PACs (averaging 8 4 beats per hour)  Aside from sinus arrhythmia, there were no other sustained cardiac dysrhythmias   History of stress test 03/15/2016     Negative EKG part of stress echo at 100% age predicted maximum heart rate  Frequant PVC's in the pattern of ventricular bigeminy at rest and in recovery with significant suppression during exercise  No evidence of complex dysrhythmias  Resting hypertension with normal blood pressure response to exercise  Exaggreated heart rate response to exercise  Cardiolite 1/8/16- Moderately abnormal strudy wi    [place] (hard of hearing)     bilat HA    Hypertension     Macular degeneration     Murmur, heart     Osteoporosis     Per [place]     Subclinical hyperthyroidism     Thyroid nodule     right thyroid nodule (neg biopsy, pe post) -Per [place]     Vision problems     mac degenaration- Per [place]      Past Surgical History:   Procedure Laterality Date    APPENDECTOMY      age 15   Dino Lemme EYE SURGERY Left     Cataract with IOL    JOINT REPLACEMENT Left 2010    knee    TX XCAPSL CTRC RMVL INSJ IO LENS PROSTH W/O ECP Right 8/8/2016    Procedure: EXTRACTION EXTRACAPSULAR CATARACT PHACO INTRAOCULAR LENS (IOL);   Surgeon: Edyta Magaña MD;  Location: [facility] MAIN OR;  Service: Ophthalmology    RESECTION TONSIL RADICAL      SKIN BIOPSY      face, back, right leg    SPLENECTOMY, TOTAL        Family History:     Family History   Problem Relation Age of Onset    Diabetes Mother     Heart disease Father         massive MI exp 72    Kidney disease Brother     Ovarian cancer Daughter         Ovarian cancer, disseminated - Per Philip Reek     Hypertension Daughter         Per [place]       Social History:     E-Cigarette/Vaping    E-Cigarette Use Never User      E-Cigarette/Vaping Substances    Nicotine No     THC No     CBD No     Flavoring No     Other No     Unknown No      Social History     Socioeconomic History    Marital status:      Spouse name: None    Number of children: 2    Years of education: None    Highest education level: None   Occupational History    None   Social Needs    Financial resource strain: Not hard at all   [employer] insecurity     Worry: Never true     Inability: Never true   [employer] needs     Medical: No     Non-medical: No   Tobacco Use    Smoking status: Never Smoker    Smokeless tobacco: Never Used   Substance and Sexual Activity    Alcohol use: Yes     Alcohol/week: 1 0 standard drinks     Types: 1 Glasses of wine per week     Comment: rarely    Drug use: No    Sexual activity: None   Lifestyle    Physical activity     Days per week: 0 days     Minutes per session: 0 min    Stress:  Only a little   Relationships    Social connections     Talks on phone: More than three times a week     Gets together: More than three times a week     Attends Latter day service: Never     Active member of club or organization: No     Attends meetings of clubs or organizations: Never     Relationship status: None    Intimate partner violence     Fear of current or ex partner: No     Emotionally abused: No     Physically abused: No     Forced sexual activity: No   Other Topics Concern    None   Social History Narrative    Most recent tobacco use screenin2019      Do you currently or have you served in the [employer]:   No      Were you activated, into active duty, as a member of the [employer] or as a Reservist:   No      High blood pressure:   Yes      Diet:   Regular       Medications and Allergies:     Current Outpatient Medications   Medication Sig Dispense Refill    acetaminophen (TYLENOL) 325 mg tablet Take 2 tablets by mouth every 6 (six) hours      ALPRAZolam (XANAX) 0 25 mg tablet take 1 tablet by mouth three times a day if needed for anxiety 60 tablet 2    amLODIPine (NORVASC) 5 mg tablet Take 1 tablet (5 mg total) by mouth 2 (two) times a day 180 tablet 1    aspirin 81 MG tablet Take 81 mg by mouth every morning   B Complex Vitamins (B-COMPLEX/B-12 PO) Take 1,500 mg by mouth 2 (two) times a day   carvedilol (COREG) 12 5 mg tablet take 1 tablet by mouth twice a day with meals 180 tablet 1    Cranberry 62659 MG CAPS Take 1 tablet by mouth 2 (two) times a day   cyanocobalamin (VITAMIN B-12) 100 MCG tablet Take 100 mcg by mouth daily      desoximetasone (TOPICORT) 0 25 % cream       diclofenac sodium (VOLTAREN) 1 % Apply 2 g topically 2 (two) times a day (Patient not taking: Reported on 4/8/2021) 1 Tube 5    losartan (COZAAR) 50 mg tablet take 2 tablets by mouth once daily 180 tablet 2    methimazole (TAPAZOLE) 5 mg tablet 1 po Monday to fridays od  Skip on saturdays and sundays 60 tablet 3    Multiple Vitamins-Minerals (PRESERVISION AREDS PO) Take 1 tablet by mouth 2 (two) times a day   multivitamin (THERAGRAN) TABS Take 1 tablet by mouth every morning   multivitamin (THERAGRAN) TABS Take 1 tablet by mouth daily      ranitidine (ZANTAC) 300 MG tablet Take 300 mg by mouth daily at bedtime      triamterene-hydrochlorothiazide (DYAZIDE) 37 5-25 mg per capsule take 1 capsule by mouth ON MONDAY [facility] AND FRIDAY 45 capsule 2    venlafaxine (EFFEXOR-XR) 150 mg 24 hr capsule 1 capsule daily       No current facility-administered medications for this visit        Allergies   Allergen Reactions    Colchicine     Hydralazine Swelling     Cheeks swelled after IV Hydralazine    Tramadol       Immunizations:     Immunization History   Administered Date(s) Administered    INFLUENZA 09/26/2013, 10/30/2014, 10/22/2015, 11/27/2018    Influenza, high dose seasonal 0 7 mL 01/07/2021    Influenza, seasonal, injectable 09/01/2019    Pneumococcal Conjugate 13-Valent 10/22/2015    Pneumococcal Polysaccharide PPV23 09/18/2012    SARS-CoV-2 / COVID-19 mRNA IM (Moderna) 01/24/2021, 02/21/2021    Tdap 12/13/2018      Health Maintenance: There are no preventive care reminders to display for this patient  Topic Date Due    Meningococcal ACWY Vaccine (1 - Risk start before 7 months 4-dose series) Never done    HIB Vaccine (1 of 1 - Risk 1-dose series) Never done      Medicare Health Risk Assessment:     /72 (BP Location: Left arm, Patient Position: Sitting, Cuff Size: Standard)   Pulse 76   Temp 98 °F (36 7 °C) (Temporal)   Resp 18   Ht 5' 1" (1 549 m)   Wt 66 2 kg (146 lb)   SpO2 92%   BMI 27 59 kg/m²      Prudence Mercer is here for her Subsequent Wellness visit  Last Medicare Wellness visit information reviewed, patient interviewed, no change since last AWV  Health Risk Assessment:   Patient rates overall health as good  Patient feels that their physical health rating is same  Patient is satisfied with their life  Eyesight was rated as same  Hearing was rated as same  Patient feels that their emotional and mental health rating is same  Patients states they are never, rarely angry  Patient states they are never, rarely unusually tired/fatigued  Pain experienced in the last 7 days has been none  Patient states that she has experienced no weight loss or gain in last 6 months  Depression Screening:   PHQ-2 Score: 0      Fall Risk Screening:    In the past year, patient has experienced: history of falling in past year    Number of falls: 1  Injured during fall?: Yes    Feels unsteady when standing or walking?: Yes Worried about falling?: Yes      Urinary Incontinence Screening:   Patient has leaked urine accidently in the last six months  evening    Home Safety:  Patient has trouble with stairs inside or outside of their home  Patient has working smoke alarms and has working carbon monoxide detector  Home safety hazards include: none  Nutrition:   Current diet is Regular  Medications:   Patient is not currently taking any over-the-counter supplements  Patient is able to manage medications  Activities of Daily Living (ADLs)/Instrumental Activities of Daily Living (IADLs):   Walk and transfer into and out of bed and chair?: Yes  Dress and groom yourself?: Yes    Bathe or shower yourself?: Yes    Feed yourself? Yes  Do your laundry/housekeeping?: Yes  Manage your money, pay your bills and track your expenses?: Yes  Make your own meals?: Yes    Do your own shopping?: Yes    Previous Hospitalizations:   Any hospitalizations or ED visits within the last 12 months?: No      Advance Care Planning:   Living will: Yes    Durable POA for healthcare:  Yes    Advanced directive: Yes    Advanced directive counseling given: Yes    Five wishes given: Yes    Patient declined ACP directive: No    End of Life Decisions reviewed with patient: Yes    Provider agrees with end of life decisions: Yes      Cognitive Screening:   Provider or family/friend/caregiver concerned regarding cognition?: No    PREVENTIVE SCREENINGS      Cardiovascular Screening:    General: Screening Current      Diabetes Screening:     General: Risks and Benefits Discussed and Screening Current      Colorectal Cancer Screening:     General: Risks and Benefits Discussed    Due for: Colonoscopy - Low Risk      Breast Cancer Screening:     General: Screening Not Indicated      Cervical Cancer Screening:    General: Screening Not Indicated      Osteoporosis Screening:    General: Risks and Benefits Discussed    Due for: DXA Axial      Abdominal Aortic Aneurysm (AAA) Screening:        General: Screening Not Indicated      Lung Cancer Screening:     General: Screening Not Indicated      Hepatitis C Screening:    General: Screening Not Indicated    Screening, Brief Intervention, and Referral to Treatment (SBIRT)    Screening  Typical number of drinks in a day: 0    Single Item Drug Screening:  How often have you used an illegal drug (including marijuana) or a prescription medication for non-medical reasons in the past year? never    Single Item Drug Screen Score: 0  Interpretation: Negative screen for possible drug use disorder    Brief Intervention  Alcohol & drug use screenings were reviewed  No concerns regarding substance use disorder identified  Other Counseling Topics:   Car/seat belt/driving safety, skin self-exam, sunscreen and calcium and vitamin D intake and regular weightbearing exercise         Aj Santiago MD

## 2021-04-08 NOTE — PATIENT INSTRUCTIONS
Medicare Preventive Visit Patient Instructions  Thank you for completing your Welcome to Medicare Visit or Medicare Annual Wellness Visit today  Your next wellness visit will be due in one year (4/9/2022)  The screening/preventive services that you may require over the next 5-10 years are detailed below  Some tests may not apply to you based off risk factors and/or age  Screening tests ordered at today's visit but not completed yet may show as past due  Also, please note that scanned in results may not display below  Preventive Screenings:  Service Recommendations Previous Testing/Comments   Colorectal Cancer Screening  * Colonoscopy    * Fecal Occult Blood Test (FOBT)/Fecal Immunochemical Test (FIT)  * Fecal DNA/Cologuard Test  * Flexible Sigmoidoscopy Age: 54-65 years old   Colonoscopy: every 10 years (may be performed more frequently if at higher risk)  OR  FOBT/FIT: every 1 year  OR  Cologuard: every 3 years  OR  Sigmoidoscopy: every 5 years  Screening may be recommended earlier than age 48 if at higher risk for colorectal cancer  Also, an individualized decision between you and your healthcare provider will decide whether screening between the ages of 74-80 would be appropriate  Colonoscopy: Not on file  FOBT/FIT: Not on file  Cologuard: Not on file  Sigmoidoscopy: Not on file    Risks and Benefits Discussed  Due for Colonoscopy - Low Risk     Breast Cancer Screening Age: 36 years old  Frequency: every 1-2 years  Not required if history of left and right mastectomy Mammogram: Not on file    Screening Not Indicated   Cervical Cancer Screening Between the ages of 21-29, pap smear recommended once every 3 years  Between the ages of 33-67, can perform pap smear with HPV co-testing every 5 years     Recommendations may differ for women with a history of total hysterectomy, cervical cancer, or abnormal pap smears in past  Pap Smear: Not on file    Screening Not Indicated   Hepatitis C Screening Once for adults born between [birth year] and 1965  More frequently in patients at high risk for Hepatitis C Hep C Antibody: Not on file    Screening Not Indicated   Diabetes Screening 1-2 times per year if you're at risk for diabetes or have pre-diabetes Fasting glucose: No results in last 5 years   A1C: No results in last 5 years    Risks and Benefits Discussed   Cholesterol Screening Once every 5 years if you don't have a lipid disorder  May order more often based on risk factors  Lipid panel: 09/15/2020    Screening Current     Other Preventive Screenings Covered by Medicare:  1  Abdominal Aortic Aneurysm (AAA) Screening: covered once if your at risk  You're considered to be at risk if you have a family history of AAA  2  Lung Cancer Screening: covers low dose CT scan once per year if you meet all of the following conditions: (1) Age 50-69; (2) No signs or symptoms of lung cancer; (3) Current smoker or have quit smoking within the last 15 years; (4) You have a tobacco smoking history of at least 30 pack years (packs per day multiplied by number of years you smoked); (5) You get a written order from a healthcare provider  3  Glaucoma Screening: covered annually if you're considered high risk: (1) You have diabetes OR (2) Family history of glaucoma OR (3)  aged 48 and older OR (3)  American aged 72 and older  3  Osteoporosis Screening: covered every 2 years if you meet one of the following conditions: (1) You're estrogen deficient and at risk for osteoporosis based off medical history and other findings; (2) Have a vertebral abnormality; (3) On glucocorticoid therapy for more than 3 months; (4) Have primary hyperparathyroidism; (5) On osteoporosis medications and need to assess response to drug therapy  · Last bone density test (DXA Scan): Not on file  5  HIV Screening: covered annually if you're between the age of 12-76   Also covered annually if you are younger than 13 and older than 72 with risk factors for HIV infection  For pregnant patients, it is covered up to 3 times per pregnancy  Immunizations:  Immunization Recommendations   Influenza Vaccine Annual influenza vaccination during flu season is recommended for all persons aged >= 6 months who do not have contraindications   Pneumococcal Vaccine (Prevnar and Pneumovax)  * Prevnar = PCV13  * Pneumovax = PPSV23   Adults 25-60 years old: 1-3 doses may be recommended based on certain risk factors  Adults 72 years old: Prevnar (PCV13) vaccine recommended followed by Pneumovax (PPSV23) vaccine  If already received PPSV23 since turning 65, then PCV13 recommended at least one year after PPSV23 dose  Hepatitis B Vaccine 3 dose series if at intermediate or high risk (ex: diabetes, end stage renal disease, liver disease)   Tetanus (Td) Vaccine - COST NOT COVERED BY MEDICARE PART B Following completion of primary series, a booster dose should be given every 10 years to maintain immunity against tetanus  Td may also be given as tetanus wound prophylaxis  Tdap Vaccine - COST NOT COVERED BY MEDICARE PART B Recommended at least once for all adults  For pregnant patients, recommended with each pregnancy  Shingles Vaccine (Shingrix) - COST NOT COVERED BY MEDICARE PART B  2 shot series recommended in those aged 48 and above     Health Maintenance Due:  There are no preventive care reminders to display for this patient  Immunizations Due:      Topic Date Due    Meningococcal ACWY Vaccine (1 - Risk start before 7 months 4-dose series) Never done    HIB Vaccine (1 of 1 - Risk 1-dose series) Never done     Advance Directives   What are advance directives? Advance directives are legal documents that state your wishes and plans for medical care  These plans are made ahead of time in case you lose your ability to make decisions for yourself  Advance directives can apply to any medical decision, such as the treatments you want, and if you want to donate organs     What are the types of advance directives? There are many types of advance directives, and each state has rules about how to use them  You may choose a combination of any of the following:  · Living will: This is a written record of the treatment you want  You can also choose which treatments you do not want, which to limit, and which to stop at a certain time  This includes surgery, medicine, IV fluid, and tube feedings  · Durable power of  for healthcare [facility]): This is a written record that states who you want to make healthcare choices for you when you are unable to make them for yourself  This person, called a proxy, is usually a family member or a friend  You may choose more than 1 proxy  · Do not resuscitate (DNR) order:  A DNR order is used in case your heart stops beating or you stop breathing  It is a request not to have certain forms of treatment, such as CPR  A DNR order may be included in other types of advance directives  · Medical directive: This covers the care that you want if you are in a coma, near death, or unable to make decisions for yourself  You can list the treatments you want for each condition  Treatment may include pain medicine, surgery, blood transfusions, dialysis, IV or tube feedings, and a ventilator (breathing machine)  · Values history: This document has questions about your views, beliefs, and how you feel and think about life  This information can help others choose the care that you would choose  Why are advance directives important? An advance directive helps you control your care  Although spoken wishes may be used, it is better to have your wishes written down  Spoken wishes can be misunderstood, or not followed  Treatments may be given even if you do not want them  An advance directive may make it easier for your family to make difficult choices about your care  Fall Prevention    Fall prevention  includes ways to make your home and other areas safer   It also includes ways you can move more carefully to prevent a fall  Health conditions that cause changes in your blood pressure, vision, or muscle strength and coordination may increase your risk for falls  Medicines may also increase your risk for falls if they make you dizzy, weak, or sleepy  Fall prevention tips:   · Stand or sit up slowly  · Use assistive devices as directed  · Wear shoes that fit well and have soles that   · Wear a personal alarm  · Stay active  · Manage your medical conditions  Home Safety Tips:  · Add items to prevent falls in the bathroom  · Keep paths clear  · Install bright lights in your home  · Keep items you use often on shelves within reach  · Paint or place reflective tape on the edges of your stairs  Urinary Incontinence   Urinary incontinence (UI)  is when you lose control of your bladder  UI develops because your bladder cannot store or empty urine properly  The 3 most common types of UI are stress incontinence, urge incontinence, or both  Medicines:   · May be given to help strengthen your bladder control  Report any side effects of medication to your healthcare provider  Do pelvic muscle exercises often:  Your pelvic muscles help you stop urinating  Squeeze these muscles tight for 5 seconds, then relax for 5 seconds  Gradually work up to squeezing for 10 seconds  Do 3 sets of 15 repetitions a day, or as directed  This will help strengthen your pelvic muscles and improve bladder control  Train your bladder:  Go to the bathroom at set times, such as every 2 hours, even if you do not feel the urge to go  You can also try to hold your urine when you feel the urge to go  For example, hold your urine for 5 minutes when you feel the urge to go  As that becomes easier, hold your urine for 10 minutes  Self-care:   · Keep a UI record  Write down how often you leak urine and how much you leak   Make a note of what you were doing when you leaked urine   · Drink liquids as directed  You may need to limit the amount of liquid you drink to help control your urine leakage  Do not drink any liquid right before you go to bed  Limit or do not have drinks that contain caffeine or alcohol  · Prevent constipation  Eat a variety of high-fiber foods  Good examples are high-fiber cereals, beans, vegetables, and whole-grain breads  Walking is the best way to trigger your intestines to have a bowel movement  · Exercise regularly and maintain a healthy weight  Weight loss and exercise will decrease pressure on your bladder and help you control your leakage  · Use a catheter as directed  to help empty your bladder  A catheter is a tiny, plastic tube that is put into your bladder to drain your urine  · Go to behavior therapy as directed  Behavior therapy may be used to help you learn to control your urge to urinate  Weight Management   Why it is important to manage your weight:  Being overweight increases your risk of health conditions such as heart disease, high blood pressure, type 2 diabetes, and certain types of cancer  It can also increase your risk for osteoarthritis, sleep apnea, and other respiratory problems  Aim for a slow, steady weight loss  Even a small amount of weight loss can lower your risk of health problems  How to lose weight safely:  A safe and healthy way to lose weight is to eat fewer calories and get regular exercise  You can lose up about 1 pound a week by decreasing the number of calories you eat by 500 calories each day  Healthy meal plan for weight management:  A healthy meal plan includes a variety of foods, contains fewer calories, and helps you stay healthy  A healthy meal plan includes the following:  · Eat whole-grain foods more often  A healthy meal plan should contain fiber  Fiber is the part of grains, fruits, and vegetables that is not broken down by your body   Whole-grain foods are healthy and provide extra fiber in your diet  Some examples of whole-grain foods are whole-wheat breads and pastas, oatmeal, brown rice, and bulgur  · Eat a variety of vegetables every day  Include dark, leafy greens such as spinach, kale, jan greens, and mustard greens  Eat yellow and orange vegetables such as carrots, sweet potatoes, and winter squash  · Eat a variety of fruits every day  Choose fresh or canned fruit (canned in its own juice or light syrup) instead of juice  Fruit juice has very little or no fiber  · Eat low-fat dairy foods  Drink fat-free (skim) milk or 1% milk  Eat fat-free yogurt and low-fat cottage cheese  Try low-fat cheeses such as mozzarella and other reduced-fat cheeses  · Choose meat and other protein foods that are low in fat  Choose beans or other legumes such as split peas or lentils  Choose fish, skinless poultry (chicken or turkey), or lean cuts of red meat (beef or pork)  Before you cook meat or poultry, cut off any visible fat  · Use less fat and oil  Try baking foods instead of frying them  Add less fat, such as margarine, sour cream, regular salad dressing and mayonnaise to foods  Eat fewer high-fat foods  Some examples of high-fat foods include french fries, doughnuts, ice cream, and cakes  · Eat fewer sweets  Limit foods and drinks that are high in sugar  This includes candy, cookies, regular soda, and sweetened drinks  Exercise:  Exercise at least 30 minutes per day on most days of the week  Some examples of exercise include walking, biking, dancing, and swimming  You can also fit in more physical activity by taking the stairs instead of the elevator or parking farther away from stores  Ask your healthcare provider about the best exercise plan for you  © Copyright [employer] 2018 Information is for End User's use only and may not be sold, redistributed or otherwise used for commercial purposes   All illustrations and images included in CareNotes® are the copyrighted property of [employer]  or [facility]

## 2021-04-28 DIAGNOSIS — E05.90 HYPERTHYROIDISM: Primary | ICD-10-CM

## 2021-04-28 RX ORDER — VENLAFAXINE HYDROCHLORIDE 150 MG/1
CAPSULE, EXTENDED RELEASE ORAL
Qty: 90 CAPSULE | Refills: 2 | Status: SHIPPED | OUTPATIENT
Start: 2021-04-28 | End: 2022-03-09 | Stop reason: DRUGHIGH

## 2021-06-21 ENCOUNTER — TELEPHONE (OUTPATIENT)
Dept: FAMILY MEDICINE CLINIC | Facility: CLINIC | Age: 86
End: 2021-06-21

## 2021-06-21 NOTE — TELEPHONE ENCOUNTER
----- Message from Charmayne Rinks, MD sent at 6/21/2021 11:57 AM EDT -----  Please call the patient regarding her result   Lab results are okay  Kidney function is stable

## 2021-06-21 NOTE — TELEPHONE ENCOUNTER
Called and left message on voicemail, labs are normal and if any questions to call office    Mari Medina

## 2021-07-05 ENCOUNTER — NURSE TRIAGE (OUTPATIENT)
Dept: OTHER | Facility: OTHER | Age: 86
End: 2021-07-05

## 2021-07-05 NOTE — TELEPHONE ENCOUNTER
LM to schedule f/u   Patient with c/o frequent urination (>3/hour) since midnight 7/4/21  Feeling of dry mouth/lips  Drinking fluids to alleviate thirst  Denies confusion  Denies pain  Denies injury, or fever  Patient already has appointment to be seen with PCP on 7/6/21  Care advise given  Informed to call back if worsening symptoms  Verbalized understanding

## 2021-07-05 NOTE — TELEPHONE ENCOUNTER
Reason for Disposition   Urinating more frequently than usual (i e , frequency)    Answer Assessment - Initial Assessment Questions  1  SYMPTOM: "What's the main symptom you're concerned about?" (e g , frequency, incontinence)      Frequent urinating  Urinating >3/hour  2  ONSET: "When did the  frequency start?"    "Last night around midnight"  3  PAIN: "Is there any pain?" If Yes, ask: "How bad is it?" (Scale: 1-10; mild, moderate, severe)     Denies  4  CAUSE: "What do you think is causing the symptoms?"      "felt dry" mouth and lips dry     5  OTHER SYMPTOMS: "Do you have any other symptoms?" (e g , fever, flank pain, blood in urine, pain with urination)      Denies    Protocols used: [facility]

## 2021-07-05 NOTE — TELEPHONE ENCOUNTER
Regarding: Excessive urinating / hydrated / sudden issue   ----- Message from [facility] sent at 7/5/2021  2:59 PM EDT -----  "I am calling about my mothers nerve issues, i just gave her xanax, she says she cant stop going to the bathroom and I  know shes dry and drinking but i dont know how she is going so excessively, this is happening as of today, suddenly

## 2021-07-06 ENCOUNTER — OFFICE VISIT (OUTPATIENT)
Dept: FAMILY MEDICINE CLINIC | Facility: CLINIC | Age: 86
End: 2021-07-06
Payer: MEDICARE

## 2021-07-06 VITALS
HEART RATE: 76 BPM | RESPIRATION RATE: 16 BRPM | SYSTOLIC BLOOD PRESSURE: 140 MMHG | HEIGHT: 61 IN | DIASTOLIC BLOOD PRESSURE: 80 MMHG | BODY MASS INDEX: 27.26 KG/M2 | OXYGEN SATURATION: 98 % | WEIGHT: 144.4 LBS | TEMPERATURE: 97.2 F

## 2021-07-06 DIAGNOSIS — E05.90 HYPERTHYROIDISM: ICD-10-CM

## 2021-07-06 DIAGNOSIS — R26.2 AMBULATORY DYSFUNCTION: ICD-10-CM

## 2021-07-06 DIAGNOSIS — F41.8 MIXED ANXIETY DEPRESSIVE DISORDER: ICD-10-CM

## 2021-07-06 DIAGNOSIS — N18.31 STAGE 3A CHRONIC KIDNEY DISEASE (HCC): ICD-10-CM

## 2021-07-06 DIAGNOSIS — I10 ESSENTIAL HYPERTENSION: Primary | ICD-10-CM

## 2021-07-06 PROCEDURE — 99214 OFFICE O/P EST MOD 30 MIN: CPT | Performed by: INTERNAL MEDICINE

## 2021-07-06 NOTE — PROGRESS NOTES
Assessment/Plan:         Problem List Items Addressed This Visit        Endocrine    Hyperthyroidism     freet4 1  11  tsh 0 01  monitor         Relevant Orders    TSH, 3rd generation with Free T4 reflex    T3       Cardiovascular and Mediastinum    Essential hypertension - Primary     sbp 140  monitor         Relevant Orders    Basic metabolic panel    CBC and differential       Genitourinary    CKD (chronic kidney disease) stage 3, GFR 30-59 ml/min [facility])     Lab Results   Component Value Date    EGFR 58 04/09/2018    EGFR 69 04/08/2018    EGFR 59 04/07/2018    CREATININE 0 89 04/09/2018    CREATININE 0 78 04/08/2018    CREATININE 0 88 04/07/2018   monitor bmp  Other    Mixed anxiety depressive disorder     On venlafaxine, xanax  Gets nervous if [place] at home daytime  Ambulatory dysfunction     Due to knee djd and muscle weakness  Try home PT  Relevant Orders    Ambulatory Referral to Home Health            Subjective:      Patient ID: Abraham Carey is a 80 y o  female  1  Anxiety-  She feels more nervous when nobody is home daytime  She takes Xanax which helps as needed  She is also on venlafaxine  No suicidal or homicidal ideations  No increased memory loss  No falls  2  htn-  Systolic blood pressure 838  No headache dizziness or lightheadedness  No increased edema  No orthopnea  No palpitations  3  Hyperthyroidism-  TSH level 0 01  Free T4 1  11  She is on methimazole  No palpitations  No tremors  No cold or heat intolerance  No hair loss  4  Gait problem   With history of knee arthritis  No falls  She has trouble ambulating gradually worsening  She does have pain in her right knee due to arthritis  No significant back pain        The following portions of the patient's history were reviewed and updated as appropriate:   Past Medical History:  She has a past medical history of Allergic, Anxiety, Arthritis, Cancer ([address] ), Chronic kidney disease, Coronary artery disease, Depression, Disease of thyroid gland, Edema, Generalized headaches, GERD (gastroesophageal reflux disease), Hiatal hernia, History of echocardiogram (02/2016), History of EKG (10/20/2017), History of Holter monitoring (12/22/2015), History of stress test (03/15/2016), [place] (hard of hearing), Hypertension, Macular degeneration, Murmur, heart, Osteoporosis, Subclinical hyperthyroidism, Thyroid nodule, and Vision problems  ,  _______________________________________________________________________  Medical Problems:  does not have any pertinent problems on file ,  _______________________________________________________________________  Past Surgical History:   has a past surgical history that includes Splenectomy, total; Resection tonsil radical; Eye surgery (Left); Appendectomy; Joint replacement (Left, 2010); Skin biopsy; and pr xcapsl ctrc rmvl insj io lens prosth w/o ecp (Right, 8/8/2016)  ,  _______________________________________________________________________  Family History:  family history includes Diabetes in her mother; Heart disease in her father; Hypertension in her daughter; Kidney disease in her brother; Ovarian cancer in her daughter ,  _______________________________________________________________________  Social History:   reports that she has never smoked  She has never used smokeless tobacco  She reports current alcohol use of about 1 0 standard drinks of alcohol per week  She reports that she does not use drugs  ,  _______________________________________________________________________  Allergies:  is allergic to colchicine, hydralazine, and tramadol     _______________________________________________________________________  Current Outpatient Medications   Medication Sig Dispense Refill    acetaminophen (TYLENOL) 325 mg tablet Take 2 tablets by mouth every 6 (six) hours      ALPRAZolam (XANAX) 0 25 mg tablet take 1 tablet by mouth three times a day if needed for anxiety 60 tablet 2    amLODIPine (NORVASC) 5 mg tablet Take 1 tablet (5 mg total) by mouth 2 (two) times a day 180 tablet 1    aspirin 81 MG tablet Take 81 mg by mouth every morning   carvedilol (COREG) 12 5 mg tablet take 1 tablet by mouth twice a day with meals 180 tablet 1    Cranberry 41405 MG CAPS Take 1 tablet by mouth 2 (two) times a day   cyanocobalamin (VITAMIN B-12) 100 MCG tablet Take 100 mcg by mouth daily      desoximetasone (TOPICORT) 0 25 % cream       losartan (COZAAR) 50 mg tablet take 2 tablets by mouth once daily 180 tablet 2    methimazole (TAPAZOLE) 5 mg tablet 1 po Monday to fridays od  Skip on saturdays and sundays 60 tablet 3    Multiple Vitamins-Minerals (PRESERVISION AREDS PO) Take 1 tablet by mouth 2 (two) times a day   triamterene-hydrochlorothiazide (DYAZIDE) 37 5-25 mg per capsule take 1 capsule by mouth ON MONDAY WEDNESDAY AND FRIDAY 45 capsule 2    venlafaxine (EFFEXOR-XR) 150 mg 24 hr capsule take 1 capsule by mouth once daily (TAKE WITH 75MG CAPSULE TO = 225MG TOTAL) 90 capsule 2    B Complex Vitamins (B-COMPLEX/B-12 PO) Take 1,500 mg by mouth 2 (two) times a day  (Patient not taking: Reported on 7/6/2021)      diclofenac sodium (VOLTAREN) 1 % Apply 2 g topically 2 (two) times a day (Patient not taking: Reported on 4/8/2021) 1 Tube 5    multivitamin (THERAGRAN) TABS Take 1 tablet by mouth every morning  (Patient not taking: Reported on 7/6/2021)      multivitamin [facility]) TABS Take 1 tablet by mouth daily (Patient not taking: Reported on 7/6/2021)      ranitidine (ZANTAC) 300 MG tablet Take 300 mg by mouth daily at bedtime (Patient not taking: Reported on 7/6/2021)       No current facility-administered medications for this visit      _______________________________________________________________________  Review of Systems   Constitutional: Negative for appetite change, chills, diaphoresis, fatigue and fever  HENT: Negative for congestion, drooling and sinus pain  Eyes: Negative for discharge and itching  Respiratory: Negative for cough, chest tightness and shortness of breath  Cardiovascular: Positive for leg swelling (same)  Negative for chest pain and palpitations  Gastrointestinal: Negative  Endocrine: Negative for polyphagia and polyuria  Genitourinary: Negative for difficulty urinating, dysuria, frequency and urgency  Musculoskeletal: Positive for arthralgias and gait problem  Skin: Negative for pallor and rash  Allergic/Immunologic: Negative for food allergies  Neurological: Negative for dizziness, seizures, speech difficulty, light-headedness, numbness and headaches  Hematological: Negative for adenopathy  Does not bruise/bleed easily  Psychiatric/Behavioral: Negative for agitation, confusion, decreased concentration, dysphoric mood and suicidal ideas  Objective:  Vitals:    07/06/21 1554   BP: 140/80   BP Location: Left arm   Patient Position: Sitting   Cuff Size: Standard   Pulse: 76   Resp: 16   Temp: (!) 97 2 °F (36 2 °C)   TempSrc: Temporal   SpO2: 98%   Weight: 65 5 kg (144 lb 6 4 oz)   Height: 5' 1" (1 549 m)     Body mass index is 27 28 kg/m²  Physical Exam  Vitals and nursing note reviewed  Constitutional:       Appearance: Normal appearance  She is well-developed  She is not ill-appearing  HENT:      Head: Atraumatic  Eyes:      General:         Right eye: No discharge  Left eye: No discharge  Neck:      Thyroid: No thyromegaly  Cardiovascular:      Rate and Rhythm: Normal rate and regular rhythm  Pulses: Normal pulses  Heart sounds: Normal heart sounds  Pulmonary:      Effort: Pulmonary effort is normal       Breath sounds: Normal breath sounds  No wheezing  Chest:      Chest wall: No tenderness  Abdominal:      General: Bowel sounds are normal  There is no distension  Palpations: Abdomen is soft  Tenderness: There is no abdominal tenderness     Musculoskeletal: General: Tenderness present  Cervical back: Neck supple  Right lower leg: Edema present  Left lower leg: Edema present  Lymphadenopathy:      Cervical: No cervical adenopathy  Skin:     Findings: No erythema  Neurological:      Mental Status: She is alert and oriented to person, place, and time     Psychiatric:         Mood and Affect: Mood normal          Behavior: Behavior normal          Judgment: Judgment normal

## 2021-07-06 NOTE — ASSESSMENT & PLAN NOTE
Lab Results   Component Value Date    EGFR 58 04/09/2018    EGFR 69 04/08/2018    EGFR 59 04/07/2018    CREATININE 0 89 04/09/2018    CREATININE 0 78 04/08/2018    CREATININE 0 88 04/07/2018   monitor bmp

## 2021-07-08 ENCOUNTER — TELEPHONE (OUTPATIENT)
Dept: OTHER | Facility: OTHER | Age: 86
End: 2021-07-08

## 2021-07-09 NOTE — TELEPHONE ENCOUNTER
We started home therapy, and will be visiting twice a week for 6 weeks  There are some level 3 drug interactions that we must go over

## 2021-07-30 ENCOUNTER — TELEPHONE (OUTPATIENT)
Dept: FAMILY MEDICINE CLINIC | Facility: CLINIC | Age: 86
End: 2021-07-30

## 2021-07-30 NOTE — TELEPHONE ENCOUNTER
[facility] called - pt's PT is on hold for 1 week - Physical Therapist is away and pt did not want to start with a new one

## 2021-08-31 ENCOUNTER — TELEPHONE (OUTPATIENT)
Dept: LAB | Facility: HOSPITAL | Age: 86
End: 2021-08-31

## 2021-09-26 DIAGNOSIS — I10 ESSENTIAL HYPERTENSION: ICD-10-CM

## 2021-09-27 RX ORDER — AMLODIPINE BESYLATE 5 MG/1
TABLET ORAL
Qty: 180 TABLET | Refills: 1 | Status: SHIPPED | OUTPATIENT
Start: 2021-09-27 | End: 2022-02-05 | Stop reason: SDUPTHER

## 2021-09-30 ENCOUNTER — APPOINTMENT (OUTPATIENT)
Dept: LAB | Facility: HOSPITAL | Age: 86
End: 2021-09-30
Payer: MEDICARE

## 2021-09-30 DIAGNOSIS — E05.90 HYPERTHYROIDISM: ICD-10-CM

## 2021-09-30 DIAGNOSIS — I10 ESSENTIAL HYPERTENSION: ICD-10-CM

## 2021-09-30 LAB
ALBUMIN SERPL BCP-MCNC: 3.5 G/DL (ref 3.5–5)
ALP SERPL-CCNC: 147 U/L (ref 46–116)
ALT SERPL W P-5'-P-CCNC: 15 U/L (ref 12–78)
ANION GAP SERPL CALCULATED.3IONS-SCNC: 5 MMOL/L (ref 4–13)
AST SERPL W P-5'-P-CCNC: 12 U/L (ref 5–45)
BASOPHILS # BLD AUTO: 0.07 THOUSANDS/ΜL (ref 0–0.1)
BASOPHILS NFR BLD AUTO: 1 % (ref 0–1)
BILIRUB SERPL-MCNC: 0.62 MG/DL (ref 0.2–1)
BUN SERPL-MCNC: 33 MG/DL (ref 5–25)
CALCIUM SERPL-MCNC: 11 MG/DL (ref 8.3–10.1)
CHLORIDE SERPL-SCNC: 105 MMOL/L (ref 100–108)
CHOLEST SERPL-MCNC: 128 MG/DL (ref 50–200)
CO2 SERPL-SCNC: 30 MMOL/L (ref 21–32)
CREAT SERPL-MCNC: 0.97 MG/DL (ref 0.6–1.3)
EOSINOPHIL # BLD AUTO: 0.19 THOUSAND/ΜL (ref 0–0.61)
EOSINOPHIL NFR BLD AUTO: 2 % (ref 0–6)
ERYTHROCYTE [DISTWIDTH] IN BLOOD BY AUTOMATED COUNT: 16.8 % (ref 11.6–15.1)
GFR SERPL CREATININE-BSD FRML MDRD: 51 ML/MIN/1.73SQ M
GLUCOSE SERPL-MCNC: 63 MG/DL (ref 65–140)
HCT VFR BLD AUTO: 44 % (ref 34.8–46.1)
HDLC SERPL-MCNC: 52 MG/DL
HGB BLD-MCNC: 13.2 G/DL (ref 11.5–15.4)
IMM GRANULOCYTES # BLD AUTO: 0.05 THOUSAND/UL (ref 0–0.2)
IMM GRANULOCYTES NFR BLD AUTO: 0 % (ref 0–2)
LDLC SERPL CALC-MCNC: 45 MG/DL (ref 0–100)
LYMPHOCYTES # BLD AUTO: 2.83 THOUSANDS/ΜL (ref 0.6–4.47)
LYMPHOCYTES NFR BLD AUTO: 25 % (ref 14–44)
MCH RBC QN AUTO: 27.3 PG (ref 26.8–34.3)
MCHC RBC AUTO-ENTMCNC: 30 G/DL (ref 31.4–37.4)
MCV RBC AUTO: 91 FL (ref 82–98)
MONOCYTES # BLD AUTO: 0.8 THOUSAND/ΜL (ref 0.17–1.22)
MONOCYTES NFR BLD AUTO: 7 % (ref 4–12)
NEUTROPHILS # BLD AUTO: 7.29 THOUSANDS/ΜL (ref 1.85–7.62)
NEUTS SEG NFR BLD AUTO: 65 % (ref 43–75)
NONHDLC SERPL-MCNC: 76 MG/DL
NRBC BLD AUTO-RTO: 0 /100 WBCS
PLATELET # BLD AUTO: 393 THOUSANDS/UL (ref 149–390)
PMV BLD AUTO: 11.6 FL (ref 8.9–12.7)
POTASSIUM SERPL-SCNC: 4.5 MMOL/L (ref 3.5–5.3)
PROT SERPL-MCNC: 7.3 G/DL (ref 6.4–8.2)
RBC # BLD AUTO: 4.84 MILLION/UL (ref 3.81–5.12)
SODIUM SERPL-SCNC: 140 MMOL/L (ref 136–145)
T3 SERPL-MCNC: 1.2 NG/ML (ref 0.6–1.8)
T4 FREE SERPL-MCNC: 1.02 NG/DL (ref 0.76–1.46)
TRIGL SERPL-MCNC: 156 MG/DL
TSH SERPL DL<=0.05 MIU/L-ACNC: 0.01 UIU/ML (ref 0.36–3.74)
WBC # BLD AUTO: 11.23 THOUSAND/UL (ref 4.31–10.16)

## 2021-09-30 PROCEDURE — 36415 COLL VENOUS BLD VENIPUNCTURE: CPT

## 2021-09-30 PROCEDURE — 85025 COMPLETE CBC W/AUTO DIFF WBC: CPT

## 2021-09-30 PROCEDURE — 84480 ASSAY TRIIODOTHYRONINE (T3): CPT

## 2021-09-30 PROCEDURE — 84443 ASSAY THYROID STIM HORMONE: CPT

## 2021-09-30 PROCEDURE — 84439 ASSAY OF FREE THYROXINE: CPT

## 2021-09-30 PROCEDURE — 80053 COMPREHEN METABOLIC PANEL: CPT

## 2021-09-30 PROCEDURE — 80061 LIPID PANEL: CPT

## 2021-10-05 ENCOUNTER — OFFICE VISIT (OUTPATIENT)
Dept: FAMILY MEDICINE CLINIC | Facility: CLINIC | Age: 86
End: 2021-10-05
Payer: MEDICARE

## 2021-10-05 VITALS
HEIGHT: 61 IN | TEMPERATURE: 97.5 F | RESPIRATION RATE: 12 BRPM | BODY MASS INDEX: 27 KG/M2 | DIASTOLIC BLOOD PRESSURE: 62 MMHG | OXYGEN SATURATION: 93 % | SYSTOLIC BLOOD PRESSURE: 134 MMHG | HEART RATE: 71 BPM | WEIGHT: 143 LBS

## 2021-10-05 DIAGNOSIS — R60.0 BILATERAL LEG EDEMA: ICD-10-CM

## 2021-10-05 DIAGNOSIS — N18.32 STAGE 3B CHRONIC KIDNEY DISEASE (HCC): ICD-10-CM

## 2021-10-05 DIAGNOSIS — E83.52 HYPERCALCEMIA: ICD-10-CM

## 2021-10-05 DIAGNOSIS — Z23 NEED FOR VACCINATION: ICD-10-CM

## 2021-10-05 DIAGNOSIS — I10 ESSENTIAL HYPERTENSION: Primary | ICD-10-CM

## 2021-10-05 DIAGNOSIS — E05.90 HYPERTHYROIDISM: ICD-10-CM

## 2021-10-05 PROCEDURE — 90662 IIV NO PRSV INCREASED AG IM: CPT

## 2021-10-05 PROCEDURE — 99214 OFFICE O/P EST MOD 30 MIN: CPT | Performed by: INTERNAL MEDICINE

## 2021-10-05 PROCEDURE — G0008 ADMIN INFLUENZA VIRUS VAC: HCPCS

## 2021-10-19 ENCOUNTER — TELEPHONE (OUTPATIENT)
Dept: LAB | Facility: HOSPITAL | Age: 86
End: 2021-10-19

## 2021-11-08 DIAGNOSIS — I10 ESSENTIAL HYPERTENSION: ICD-10-CM

## 2021-11-08 RX ORDER — LOSARTAN POTASSIUM 50 MG/1
TABLET ORAL
Qty: 180 TABLET | Refills: 2 | Status: SHIPPED | OUTPATIENT
Start: 2021-11-08 | End: 2022-07-29

## 2021-11-12 ENCOUNTER — TELEPHONE (OUTPATIENT)
Dept: FAMILY MEDICINE CLINIC | Facility: CLINIC | Age: 86
End: 2021-11-12

## 2021-11-17 ENCOUNTER — TELEPHONE (OUTPATIENT)
Dept: LAB | Facility: HOSPITAL | Age: 86
End: 2021-11-17

## 2021-11-18 ENCOUNTER — APPOINTMENT (OUTPATIENT)
Dept: LAB | Facility: HOSPITAL | Age: 86
End: 2021-11-18
Payer: MEDICARE

## 2021-11-18 DIAGNOSIS — I10 ESSENTIAL HYPERTENSION: ICD-10-CM

## 2021-11-18 DIAGNOSIS — E83.52 HYPERCALCEMIA: ICD-10-CM

## 2021-11-18 DIAGNOSIS — E21.3 HYPERPARATHYROIDISM (HCC): Primary | ICD-10-CM

## 2021-11-18 LAB
ANION GAP SERPL CALCULATED.3IONS-SCNC: 5 MMOL/L (ref 4–13)
BUN SERPL-MCNC: 21 MG/DL (ref 5–25)
CALCIUM SERPL-MCNC: 10.6 MG/DL (ref 8.3–10.1)
CHLORIDE SERPL-SCNC: 105 MMOL/L (ref 100–108)
CO2 SERPL-SCNC: 29 MMOL/L (ref 21–32)
CREAT SERPL-MCNC: 1.03 MG/DL (ref 0.6–1.3)
GFR SERPL CREATININE-BSD FRML MDRD: 48 ML/MIN/1.73SQ M
GLUCOSE SERPL-MCNC: 88 MG/DL (ref 65–140)
POTASSIUM SERPL-SCNC: 4.2 MMOL/L (ref 3.5–5.3)
PTH-INTACT SERPL-MCNC: 89.1 PG/ML (ref 18.4–80.1)
SODIUM SERPL-SCNC: 139 MMOL/L (ref 136–145)

## 2021-11-18 PROCEDURE — 36415 COLL VENOUS BLD VENIPUNCTURE: CPT

## 2021-11-18 PROCEDURE — 83970 ASSAY OF PARATHORMONE: CPT

## 2021-11-18 PROCEDURE — 80048 BASIC METABOLIC PNL TOTAL CA: CPT

## 2021-11-19 ENCOUNTER — TELEPHONE (OUTPATIENT)
Dept: FAMILY MEDICINE CLINIC | Facility: CLINIC | Age: 86
End: 2021-11-19

## 2021-11-30 DIAGNOSIS — I10 ESSENTIAL HYPERTENSION: ICD-10-CM

## 2021-11-30 RX ORDER — TRIAMTERENE AND HYDROCHLOROTHIAZIDE 37.5; 25 MG/1; MG/1
CAPSULE ORAL
Qty: 45 CAPSULE | Refills: 2 | Status: SHIPPED | OUTPATIENT
Start: 2021-11-30 | End: 2022-03-09 | Stop reason: DRUGHIGH

## 2022-01-04 ENCOUNTER — TELEPHONE (OUTPATIENT)
Dept: LAB | Facility: HOSPITAL | Age: 87
End: 2022-01-04

## 2022-01-04 ENCOUNTER — OFFICE VISIT (OUTPATIENT)
Dept: FAMILY MEDICINE CLINIC | Facility: CLINIC | Age: 87
End: 2022-01-04
Payer: MEDICARE

## 2022-01-04 VITALS
WEIGHT: 135.2 LBS | DIASTOLIC BLOOD PRESSURE: 70 MMHG | HEART RATE: 82 BPM | OXYGEN SATURATION: 98 % | BODY MASS INDEX: 25.53 KG/M2 | SYSTOLIC BLOOD PRESSURE: 120 MMHG | TEMPERATURE: 97.3 F | RESPIRATION RATE: 16 BRPM | HEIGHT: 61 IN

## 2022-01-04 DIAGNOSIS — I10 ESSENTIAL HYPERTENSION: Primary | ICD-10-CM

## 2022-01-04 DIAGNOSIS — E21.3 HYPERPARATHYROIDISM (HCC): ICD-10-CM

## 2022-01-04 DIAGNOSIS — E05.90 HYPERTHYROIDISM: ICD-10-CM

## 2022-01-04 DIAGNOSIS — N18.32 STAGE 3B CHRONIC KIDNEY DISEASE (HCC): ICD-10-CM

## 2022-01-04 PROCEDURE — 99215 OFFICE O/P EST HI 40 MIN: CPT | Performed by: INTERNAL MEDICINE

## 2022-01-04 RX ORDER — CHLORHEXIDINE GLUCONATE 0.12 MG/ML
RINSE ORAL
COMMUNITY
Start: 2021-12-13 | End: 2022-05-17

## 2022-01-04 NOTE — ASSESSMENT & PLAN NOTE
Lab Results   Component Value Date    EGFR 48 11/18/2021    EGFR 51 09/30/2021    EGFR 58 04/09/2018    CREATININE 1 03 11/18/2021    CREATININE 0 97 09/30/2021    CREATININE 0 89 04/09/2018   bmp reviewed   monitor

## 2022-01-04 NOTE — PROGRESS NOTES
Assessment/Plan:         Problem List Items Addressed This Visit        Endocrine    Hyperthyroidism     t3 total 1 20, tsh 0 011  monitor  Continue methimazole 5 mg  Hyperparathyroidism ([facility] [address] )     Intact pth up, calcium 10 5-11  Check parathyroid scan and dexa scan  Has appt with endocrinologist           Relevant Orders    DXA bone density spine hip and pelvis    Vitamin D 25 hydroxy    NM parathyroid scan w spect       Cardiovascular and Mediastinum    Essential hypertension - Primary       Genitourinary    CKD (chronic kidney disease) stage 3, GFR 30-59 ml/min ([facility])     Lab Results   Component Value Date    EGFR 48 11/18/2021    EGFR 51 09/30/2021    EGFR 58 04/09/2018    CREATININE 1 03 11/18/2021    CREATININE 0 97 09/30/2021    CREATININE 0 89 04/09/2018   bmp reviewed  monitor         Relevant Orders    Basic metabolic panel            Subjective:      Patient ID: Britney Harding is a 80 y o  female  1  htn-no headache dizziness or lightheadedness  No chest pain or increased dyspnea  No edema  No syncope or palpitations  No cough  Tolerating medicine well  2  ckd-3-GFR is around 46  No increased edema  No hematuria  No abdominal pain  3  Hyperparathyroidism-no increased weakness  No change in mental status  No tremors  No falls or recent fracture  4  Hyperthyroidism-no palpitations or lightheadedness  No tremors  No hair loss  No diarrhea or constipation    No memory loss increased      The following portions of the patient's history were reviewed and updated as appropriate:   Past Medical History:  She has a past medical history of Allergic, Anxiety, Arthritis, Cancer ([facility] [address] ), Chronic kidney disease, Coronary artery disease, Depression, Disease of thyroid gland, Edema, Generalized headaches, GERD (gastroesophageal reflux disease), Hiatal hernia, History of echocardiogram (02/2016), History of EKG (10/20/2017), History of Holter monitoring (12/22/2015), History of stress test (03/15/2016), [place] (hard of hearing), Hypertension, Macular degeneration, Murmur, heart, Osteoporosis, Subclinical hyperthyroidism, Thyroid nodule, and Vision problems  ,  _______________________________________________________________________  Medical Problems:  does not have any pertinent problems on file ,  _______________________________________________________________________  Past Surgical History:   has a past surgical history that includes Splenectomy, total; Resection tonsil radical; Eye surgery (Left); Appendectomy; Joint replacement (Left, 2010); Skin biopsy; and pr xcapsl ctrc rmvl insj io lens prosth w/o ecp (Right, 8/8/2016)  ,  _______________________________________________________________________  Family History:  family history includes Diabetes in her mother; Heart disease in her father; Hypertension in her daughter; Kidney disease in her brother; Ovarian cancer in her daughter ,  _______________________________________________________________________  Social History:   reports that she has never smoked  She has never used smokeless tobacco  She reports current alcohol use of about 1 0 standard drink of alcohol per week  She reports that she does not use drugs  ,  _______________________________________________________________________  Allergies:  is allergic to colchicine, hydralazine, and tramadol     _______________________________________________________________________  Current Outpatient Medications   Medication Sig Dispense Refill    acetaminophen (TYLENOL) 325 mg tablet Take 2 tablets by mouth every 6 (six) hours      ALPRAZolam (XANAX) 0 25 mg tablet take 1 tablet by mouth three times a day if needed for anxiety 60 tablet 3    amLODIPine (NORVASC) 5 mg tablet take 1 tablet by mouth twice a day 180 tablet 1    aspirin 81 MG tablet Take 81 mg by mouth every morning        carvedilol (COREG) 12 5 mg tablet take 1 tablet by mouth twice a day with meals 180 tablet 1    Cranberry 90203 MG CAPS Take 1 tablet by mouth 2 (two) times a day   cyanocobalamin (VITAMIN B-12) 100 MCG tablet Take 100 mcg by mouth daily      desoximetasone (TOPICORT) 0 25 % cream       losartan (COZAAR) 50 mg tablet take 2 tablets by mouth once daily 180 tablet 2    methimazole (TAPAZOLE) 5 mg tablet 1 po Monday to fridays od  Skip on saturdays and sundays 60 tablet 3    triamterene-hydrochlorothiazide (DYAZIDE) 37 5-25 mg per capsule take 1 capsule by mouth once daily ON MONDAY [facility] AND FRIDAY 45 capsule 2    venlafaxine (EFFEXOR-XR) 150 mg 24 hr capsule take 1 capsule by mouth once daily (TAKE WITH 75MG CAPSULE TO = 225MG TOTAL) 90 capsule 2    B Complex Vitamins (B-COMPLEX/B-12 PO) Take 1,500 mg by mouth 2 (two) times a day  (Patient not taking: Reported on 7/6/2021)      chlorhexidine (PERIDEX) 0 12 % solution RINSE WITH 15 MLS TWICE A DAY ONCE IN THE MORNING AND AT BEDTIME (Patient not taking: Reported on 1/4/2022)      diclofenac sodium (VOLTAREN) 1 % Apply 2 g topically 2 (two) times a day (Patient not taking: Reported on 4/8/2021) 1 Tube 5    Multiple Vitamins-Minerals (PRESERVISION AREDS PO) Take 1 tablet by mouth 2 (two) times a day  (Patient not taking: Reported on 10/5/2021)      multivitamin (THERAGRAN) TABS Take 1 tablet by mouth every morning  (Patient not taking: Reported on 7/6/2021)      multivitamin [facility]) TABS Take 1 tablet by mouth daily (Patient not taking: Reported on 7/6/2021)      ranitidine (ZANTAC) 300 MG tablet Take 300 mg by mouth daily at bedtime (Patient not taking: Reported on 7/6/2021)       No current facility-administered medications for this visit      _______________________________________________________________________  Review of Systems   Constitutional: Negative for appetite change, chills, diaphoresis, fatigue and fever  HENT: Negative for congestion, drooling and sinus pain  Eyes: Negative for discharge and itching     Respiratory: Negative for cough, chest tightness and shortness of breath  Cardiovascular: Negative for chest pain, palpitations and leg swelling  Gastrointestinal: Negative  Endocrine: Negative for polyphagia and polyuria  Genitourinary: Negative for difficulty urinating, dysuria, frequency and urgency  Musculoskeletal: Positive for gait problem  Skin: Negative for pallor and rash  Allergic/Immunologic: Negative for food allergies  Neurological: Negative for dizziness, seizures, speech difficulty, light-headedness, numbness and headaches  Hematological: Negative for adenopathy  Does not bruise/bleed easily  Psychiatric/Behavioral: Positive for sleep disturbance (sometimes)  Negative for agitation, confusion, decreased concentration and suicidal ideas  Objective:  Vitals:    01/04/22 1452   BP: 120/70   BP Location: Left arm   Patient Position: Sitting   Cuff Size: Standard   Pulse: 82   Resp: 16   Temp: (!) 97 3 °F (36 3 °C)   TempSrc: Temporal   SpO2: 98%   Weight: 61 3 kg (135 lb 3 2 oz)   Height: 5' 1" (1 549 m)     Body mass index is 25 55 kg/m²  Physical Exam  Vitals and nursing note reviewed  Constitutional:       Appearance: Normal appearance  She is well-developed  HENT:      Head: Atraumatic  Eyes:      General:         Right eye: No discharge  Left eye: No discharge  Neck:      Thyroid: No thyromegaly  Cardiovascular:      Rate and Rhythm: Normal rate and regular rhythm  Pulses: Normal pulses  Heart sounds: Normal heart sounds  Pulmonary:      Effort: Pulmonary effort is normal       Breath sounds: Normal breath sounds  No wheezing  Chest:      Chest wall: No tenderness  Abdominal:      General: Bowel sounds are normal  There is no distension  Palpations: Abdomen is soft  Tenderness: There is no abdominal tenderness  Musculoskeletal:      Cervical back: Neck supple  Right lower leg: No edema  Left lower leg: No edema     Lymphadenopathy:      Cervical: No cervical adenopathy  Skin:     Findings: No erythema  Neurological:      Mental Status: She is alert and oriented to person, place, and time     Psychiatric:         Mood and Affect: Mood normal          Behavior: Behavior normal          Judgment: Judgment normal

## 2022-01-04 NOTE — ASSESSMENT & PLAN NOTE
Intact pth up, calcium 10 5-11  Check parathyroid scan and dexa scan   Has appt with endocrinologist

## 2022-01-31 ENCOUNTER — OFFICE VISIT (OUTPATIENT)
Dept: PODIATRY | Facility: CLINIC | Age: 87
End: 2022-01-31
Payer: MEDICARE

## 2022-01-31 VITALS
HEIGHT: 61 IN | DIASTOLIC BLOOD PRESSURE: 70 MMHG | BODY MASS INDEX: 25.49 KG/M2 | SYSTOLIC BLOOD PRESSURE: 120 MMHG | RESPIRATION RATE: 17 BRPM | WEIGHT: 135 LBS

## 2022-01-31 DIAGNOSIS — L03.039 PARONYCHIA OF TOENAIL, UNSPECIFIED LATERALITY: ICD-10-CM

## 2022-01-31 DIAGNOSIS — M79.672 PAIN IN BOTH FEET: Primary | ICD-10-CM

## 2022-01-31 DIAGNOSIS — M79.671 PAIN IN BOTH FEET: Primary | ICD-10-CM

## 2022-01-31 DIAGNOSIS — B35.1 ONYCHOMYCOSIS: ICD-10-CM

## 2022-01-31 DIAGNOSIS — I70.209 PERIPHERAL ARTERIOSCLEROSIS (HCC): ICD-10-CM

## 2022-01-31 DIAGNOSIS — L84 CORNS: ICD-10-CM

## 2022-01-31 PROCEDURE — 99202 OFFICE O/P NEW SF 15 MIN: CPT | Performed by: PODIATRIST

## 2022-01-31 NOTE — PROGRESS NOTES
Assessment/Plan:  Pain upon ambulation  Mycosis of nail  Clavi by formation 2nd toe bilateral   Peripheral artery disease  Paronychia hallux bilateral    Plan  Foot exam performed  Patient educated on condition  All nails debrided  Calluses debrided  Patient watch for signs of infection       Diagnoses and all orders for this visit:    Pain in both feet    Peripheral arteriosclerosis ([address] )    Corns    Onychomycosis    Paronychia of toenail, unspecified laterality          Subjective:  Patient has pain in her toes with ambulation  No history of trauma  She has pain when she wears shoes    Allergies   Allergen Reactions    Colchicine     Hydralazine Swelling     Cheeks swelled after IV Hydralazine    Tramadol          Current Outpatient Medications:     acetaminophen (TYLENOL) 325 mg tablet, Take 2 tablets by mouth every 6 (six) hours, Disp: , Rfl:     ALPRAZolam (XANAX) 0 25 mg tablet, take 1 tablet by mouth three times a day if needed for anxiety, Disp: 60 tablet, Rfl: 3    amLODIPine (NORVASC) 5 mg tablet, take 1 tablet by mouth twice a day, Disp: 180 tablet, Rfl: 1    aspirin 81 MG tablet, Take 81 mg by mouth every morning , Disp: , Rfl:     B Complex Vitamins (B-COMPLEX/B-12 PO), Take 1,500 mg by mouth 2 (two) times a day   (Patient not taking: Reported on 7/6/2021), Disp: , Rfl:     carvedilol (COREG) 12 5 mg tablet, take 1 tablet by mouth twice a day with meals, Disp: 180 tablet, Rfl: 1    chlorhexidine (PERIDEX) 0 12 % solution, RINSE WITH 15 MLS TWICE A DAY ONCE IN THE MORNING AND AT BEDTIME (Patient not taking: Reported on 1/4/2022), Disp: , Rfl:     Cranberry 32123 MG CAPS, Take 1 tablet by mouth 2 (two) times a day , Disp: , Rfl:     cyanocobalamin (VITAMIN B-12) 100 MCG tablet, Take 100 mcg by mouth daily, Disp: , Rfl:     desoximetasone (TOPICORT) 0 25 % cream, , Disp: , Rfl:     diclofenac sodium (VOLTAREN) 1 %, Apply 2 g topically 2 (two) times a day (Patient not taking: Reported on 4/8/2021), Disp: 1 Tube, Rfl: 5    losartan (COZAAR) 50 mg tablet, take 2 tablets by mouth once daily, Disp: 180 tablet, Rfl: 2    methimazole (TAPAZOLE) 5 mg tablet, 1 po Monday to fridays od  Skip on saturdays and sundays, Disp: 60 tablet, Rfl: 3    Multiple Vitamins-Minerals (PRESERVISION AREDS PO), Take 1 tablet by mouth 2 (two) times a day  (Patient not taking: Reported on 10/5/2021), Disp: , Rfl:     multivitamin (THERAGRAN) TABS, Take 1 tablet by mouth every morning  (Patient not taking: Reported on 7/6/2021), Disp: , Rfl:     multivitamin (THERAGRAN) TABS, Take 1 tablet by mouth daily (Patient not taking: Reported on 7/6/2021), Disp: , Rfl:     ranitidine (ZANTAC) 300 MG tablet, Take 300 mg by mouth daily at bedtime (Patient not taking: Reported on 7/6/2021), Disp: , Rfl:     triamterene-hydrochlorothiazide (DYAZIDE) 37 5-25 mg per capsule, take 1 capsule by mouth once daily ON MONDAY [facility] AND FRIDAY, Disp: 45 capsule, Rfl: 2    venlafaxine (EFFEXOR-XR) 150 mg 24 hr capsule, take 1 capsule by mouth once daily (TAKE WITH 75MG CAPSULE TO = 225MG TOTAL), Disp: 90 capsule, Rfl: 2    Patient Active Problem List   Diagnosis    Hypertensive urgency    Anxiety    Arthritis    Hypokalemia    Essential hypertension    CKD (chronic kidney disease) stage 3, GFR 30-59 ml/min ([facility] [address] )    Hyperthyroidism    Medicare annual wellness visit, initial    Mixed anxiety depressive disorder    Ambulatory dysfunction    Hypercalcemia    Bilateral leg edema    Hyperparathyroidism ([facility] [address] )          Patient ID: Burke Koo is a 80 y o  female  HPI    The following portions of the patient's history were reviewed and updated as appropriate:     family history includes Diabetes in her mother; Heart disease in her father; Hypertension in her daughter; Kidney disease in her brother; Ovarian cancer in her daughter  reports that she has never smoked   She has never used smokeless tobacco  She reports current alcohol use of about 1 0 standard drink of alcohol per week  She reports that she does not use drugs  Vitals:    01/31/22 1326   BP: 120/70   Resp: 17       Review of Systems      Objective:  Patient's shoes and socks removed  Foot Exam    General  General Appearance: appears stated age and healthy   Orientation: alert and oriented to person, place, and time   Affect: appropriate   Gait: antalgic   Assistance: walker use       Right Foot/Ankle     Inspection and Palpation  Tenderness: metatarsals   Swelling: dorsum   Arch: pes planus  Hammertoes: fifth toe  Hallux valgus: yes  Skin Exam: callus and dry skin;     Neurovascular  Dorsalis pedis: 1+  Posterior tibial: 1+      Left Foot/Ankle      Inspection and Palpation  Tenderness: metatarsals   Swelling: dorsum   Arch: pes planus  Hammertoes: fifth toe  Hallux valgus: yes  Skin Exam: callus and dry skin;     Neurovascular  Dorsalis pedis: 1+  Posterior tibial: 1+        Physical Exam  Vitals and nursing note reviewed  Constitutional:       Appearance: Normal appearance  Cardiovascular:      Rate and Rhythm: Normal rate and regular rhythm  Pulses:           Dorsalis pedis pulses are 1+ on the right side and 1+ on the left side  Posterior tibial pulses are 1+ on the right side and 1+ on the left side  Comments: Q, 9 findings bilateral   Negative digital hair  Positive abnormal cooling bilateral  Musculoskeletal:      Right foot: Bunion present  Left foot: Bunion present  Feet:      Right foot:      Skin integrity: Callus and dry skin present  Left foot:      Skin integrity: Callus and dry skin present  Skin:     Capillary Refill: Capillary refill takes less than 2 seconds  Comments: All nails are dystrophic  They demonstrate distal mycosis  Hallux bilateral as paronychia  2nd toe bilateral has clavi    Neurological:      Mental Status: She is alert     Psychiatric:         Mood and Affect: Mood normal          Behavior: Behavior normal          Thought Content:  Thought content normal          Judgment: Judgment normal

## 2022-02-05 DIAGNOSIS — E05.90 HYPERTHYROIDISM: ICD-10-CM

## 2022-02-05 DIAGNOSIS — I10 ESSENTIAL HYPERTENSION: ICD-10-CM

## 2022-02-05 NOTE — TELEPHONE ENCOUNTER
Pt's daughter called requesting a refill for the following rx   Pharmacy information was verified ad confirmed per pt's request

## 2022-02-07 RX ORDER — AMLODIPINE BESYLATE 5 MG/1
5 TABLET ORAL 2 TIMES DAILY
Qty: 180 TABLET | Refills: 0 | Status: SHIPPED | OUTPATIENT
Start: 2022-02-07 | End: 2022-04-25 | Stop reason: SDUPTHER

## 2022-02-07 RX ORDER — CARVEDILOL 12.5 MG/1
12.5 TABLET ORAL 2 TIMES DAILY WITH MEALS
Qty: 180 TABLET | Refills: 0 | Status: SHIPPED | OUTPATIENT
Start: 2022-02-07 | End: 2022-04-25 | Stop reason: SDUPTHER

## 2022-02-07 RX ORDER — METHIMAZOLE 5 MG/1
TABLET ORAL
Qty: 60 TABLET | Refills: 0 | Status: SHIPPED | OUTPATIENT
Start: 2022-02-07 | End: 2022-02-08

## 2022-03-07 ENCOUNTER — HOSPITAL ENCOUNTER (OUTPATIENT)
Dept: NUCLEAR MEDICINE | Facility: HOSPITAL | Age: 87
Discharge: HOME | End: 2022-03-07
Attending: INTERNAL MEDICINE
Payer: MEDICARE

## 2022-03-07 DIAGNOSIS — E21.3 HYPERPARATHYROIDISM (HCC): ICD-10-CM

## 2022-03-07 PROCEDURE — 78072 PARATHYRD PLANAR W/SPECT&CT: CPT

## 2022-03-07 PROCEDURE — A9500 TC99M SESTAMIBI: HCPCS

## 2022-03-07 PROCEDURE — G1004 CDSM NDSC: HCPCS

## 2022-03-08 ENCOUNTER — OFFICE VISIT (OUTPATIENT)
Dept: FAMILY MEDICINE CLINIC | Facility: CLINIC | Age: 87
End: 2022-03-08
Payer: MEDICARE

## 2022-03-08 VITALS
HEART RATE: 55 BPM | WEIGHT: 133 LBS | DIASTOLIC BLOOD PRESSURE: 70 MMHG | SYSTOLIC BLOOD PRESSURE: 110 MMHG | RESPIRATION RATE: 16 BRPM | OXYGEN SATURATION: 96 % | HEIGHT: 61 IN | TEMPERATURE: 96.9 F | BODY MASS INDEX: 25.11 KG/M2

## 2022-03-08 DIAGNOSIS — R60.0 BILATERAL LEG EDEMA: ICD-10-CM

## 2022-03-08 DIAGNOSIS — R91.8 MASS OF UPPER LOBE OF LEFT LUNG: Primary | ICD-10-CM

## 2022-03-08 PROCEDURE — 99214 OFFICE O/P EST MOD 30 MIN: CPT | Performed by: INTERNAL MEDICINE

## 2022-03-08 NOTE — ASSESSMENT & PLAN NOTE
5 cm size on parathyroid scan  Check PET scan rather than CT chest with contrast to avoid 2 dye exposure  Parathyroid scan results reviewed and interpreted  Discussed with patient and daughter about different possibilities

## 2022-03-08 NOTE — PROGRESS NOTES
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 - Primary     5 cm size on parathyroid scan  Check PET scan rather then CT chest with contrast to avoid 2 dye exposure  Relevant Orders    NM PET CT skull base to mid thigh            Subjective:      Patient ID: Jaycob Good is a 80 y o  female  1  Left upper lung mass- there was irregular Denise Pilon with the size of approximately 5 cm noticed in her left upper lung area while she underwent for parathyroid scan  Patient denies any cough or hemoptysis  No chest pain  No increased dyspnea  No loss of appetite or nausea or abdominal pain  No loss of weight  2  She does have a lump in the sternal area for past several weeks  It is not painful  It is smooth and mobile  Skin about the lump is normal   She also has a lesion in the right lower chest anteriorly for so many years which might have gotten little bigger according to patient  The following portions of the patient's history were reviewed and updated as appropriate:   Past Medical History:  She has a past medical history of Allergic, Anxiety, Arthritis, Cancer ([address] ), Chronic kidney disease, Coronary artery disease, Depression, Disease of thyroid gland, Edema, Generalized headaches, GERD (gastroesophageal reflux disease), Hiatal hernia, History of echocardiogram (02/2016), History of EKG (10/20/2017), History of Holter monitoring (12/22/2015), History of stress test (03/15/2016), [place] (hard of hearing), Hypertension, Macular degeneration, Murmur, heart, Osteoporosis, Subclinical hyperthyroidism, Thyroid nodule, and Vision problems  ,  _______________________________________________________________________  Medical Problems:  does not have any pertinent problems on file ,  _______________________________________________________________________  Past Surgical History:   has a past surgical history that includes Splenectomy, total; Resection tonsil radical; Eye surgery (Left); Appendectomy; Joint replacement (Left, 2010); Skin biopsy; and pr xcapsl ctrc rmvl insj io lens prosth w/o ecp (Right, 8/8/2016)  ,  _______________________________________________________________________  Family History:  family history includes Diabetes in her mother; Heart disease in her father; Hypertension in her daughter; Kidney disease in her brother; Ovarian cancer in her daughter ,  _______________________________________________________________________  Social History:   reports that she has never smoked  She has never used smokeless tobacco  She reports current alcohol use of about 1 0 standard drink of alcohol per week  She reports that she does not use drugs  ,  _______________________________________________________________________  Allergies:  is allergic to colchicine, hydralazine, and tramadol     _______________________________________________________________________  Current Outpatient Medications   Medication Sig Dispense Refill    acetaminophen (TYLENOL) 325 mg tablet Take 2 tablets by mouth every 6 (six) hours      ALPRAZolam (XANAX) 0 25 mg tablet take 1 tablet by mouth three times a day if needed for anxiety 60 tablet 3    amLODIPine (NORVASC) 5 mg tablet Take 1 tablet (5 mg total) by mouth 2 (two) times a day 180 tablet 0    aspirin 81 MG tablet Take 81 mg by mouth every morning   carvedilol (COREG) 12 5 mg tablet Take 1 tablet (12 5 mg total) by mouth 2 (two) times a day with meals 180 tablet 0    Cranberry 49875 MG CAPS Take 1 tablet by mouth 2 (two) times a day        cyanocobalamin (VITAMIN B-12) 100 MCG tablet Take 100 mcg by mouth daily      losartan (COZAAR) 50 mg tablet take 2 tablets by mouth once daily 180 tablet 2    methimazole (TAPAZOLE) 5 mg tablet take 1 tablet by mouth once daily MONDAY TO FRIDAY SKIP ON SATURDAYS AND SUNDAYS 60 tablet 12    Multiple Vitamins-Minerals (PRESERVISION AREDS 2 PO) Take by mouth 2 (two) times a day      triamterene-hydrochlorothiazide (DYAZIDE) 37 5-25 mg per capsule take 1 capsule by mouth once daily ON MONDAY [facility] AND FRIDAY 45 capsule 2    venlafaxine (EFFEXOR-XR) 150 mg 24 hr capsule take 1 capsule by mouth once daily (TAKE WITH 75MG CAPSULE TO = 225MG TOTAL) (Patient taking differently: 150 mg daily PATIENT STATES IS ONLY TAKING 150 MG DAILY  ) 90 capsule 2    B Complex Vitamins (B-COMPLEX/B-12 PO) Take 1,500 mg by mouth 2 (two) times a day  (Patient not taking: Reported on 7/6/2021)      chlorhexidine (PERIDEX) 0 12 % solution RINSE WITH 15 MLS TWICE A DAY ONCE IN THE MORNING AND AT BEDTIME (Patient not taking: Reported on 1/4/2022)      desoximetasone (TOPICORT) 0 25 % cream  (Patient not taking: Reported on 3/8/2022 )      diclofenac sodium (VOLTAREN) 1 % Apply 2 g topically 2 (two) times a day (Patient not taking: Reported on 4/8/2021) 1 Tube 5    Multiple Vitamins-Minerals (PRESERVISION AREDS PO) Take 1 tablet by mouth 2 (two) times a day  (Patient not taking: Reported on 10/5/2021)      multivitamin (THERAGRAN) TABS Take 1 tablet by mouth every morning  (Patient not taking: Reported on 7/6/2021)      multivitamin [facility]) TABS Take 1 tablet by mouth daily (Patient not taking: Reported on 7/6/2021)      ranitidine (ZANTAC) 300 MG tablet Take 300 mg by mouth daily at bedtime (Patient not taking: Reported on 7/6/2021)       No current facility-administered medications for this visit      _______________________________________________________________________  Review of Systems   Constitutional: Negative for appetite change, chills, diaphoresis, fatigue, fever and unexpected weight change  HENT: Negative for congestion, drooling and sinus pain  Eyes: Negative for discharge and itching     Respiratory: Negative for cough, chest tightness and shortness of breath  Cardiovascular: Positive for leg swelling  Negative for chest pain and palpitations  Gastrointestinal: Negative  Endocrine: Negative for polyphagia and polyuria  Genitourinary: Negative for difficulty urinating, dysuria, frequency and urgency  Skin: Negative for pallor and rash  Allergic/Immunologic: Negative for food allergies  Neurological: Negative for dizziness, seizures, speech difficulty, light-headedness, numbness and headaches  Hematological: Negative for adenopathy  Does not bruise/bleed easily  Psychiatric/Behavioral: Negative for agitation, confusion, decreased concentration, dysphoric mood and suicidal ideas  The patient is not nervous/anxious  Objective:  Vitals:    03/08/22 1358   BP: 110/70   BP Location: Left arm   Patient Position: Sitting   Cuff Size: Standard   Pulse: 55   Resp: 16   Temp: (!) 96 9 °F (36 1 °C)   TempSrc: Temporal   SpO2: 96%   Weight: 60 3 kg (133 lb)   Height: 5' 1" (1 549 m)     Body mass index is 25 13 kg/m²  Physical Exam  Vitals and nursing note reviewed  Constitutional:       Appearance: Normal appearance  She is well-developed  HENT:      Head: Atraumatic  Eyes:      General:         Right eye: No discharge  Left eye: No discharge  Neck:      Thyroid: No thyromegaly  Cardiovascular:      Rate and Rhythm: Normal rate and regular rhythm  Heart sounds: Normal heart sounds  Pulmonary:      Effort: Pulmonary effort is normal       Breath sounds: Normal breath sounds  No wheezing  Chest:      Chest wall: No tenderness  Abdominal:      General: Bowel sounds are normal       Palpations: Abdomen is soft  Tenderness: There is no abdominal tenderness  Musculoskeletal:         General: No tenderness  Right lower leg: Edema present  Left lower leg: Edema present  Lymphadenopathy:      Cervical: No cervical adenopathy     Skin:     Comments: Smooth mobile non tender lump lower mid sternal area  One mole for years wo any change in size/color   Neurological:      Mental Status: She is alert and oriented to person, place, and time     Psychiatric:         Behavior: Behavior normal          Judgment: Judgment normal

## 2022-03-09 DIAGNOSIS — E05.90 HYPERTHYROIDISM: ICD-10-CM

## 2022-03-09 RX ORDER — VENLAFAXINE HYDROCHLORIDE 150 MG/1
150 CAPSULE, EXTENDED RELEASE ORAL DAILY
COMMUNITY

## 2022-03-09 RX ORDER — TRIAMTERENE AND HYDROCHLOROTHIAZIDE 37.5; 25 MG/1; MG/1
1 CAPSULE ORAL EVERY MORNING
COMMUNITY
End: 2022-06-22 | Stop reason: ALTCHOICE

## 2022-03-18 ENCOUNTER — APPOINTMENT (OUTPATIENT)
Dept: LAB | Facility: HOSPITAL | Age: 87
End: 2022-03-18
Payer: MEDICARE

## 2022-03-18 DIAGNOSIS — E21.3 HYPERPARATHYROIDISM (HCC): ICD-10-CM

## 2022-03-18 DIAGNOSIS — N18.32 STAGE 3B CHRONIC KIDNEY DISEASE (HCC): ICD-10-CM

## 2022-03-18 LAB
25(OH)D3 SERPL-MCNC: 30.8 NG/ML (ref 30–100)
ANION GAP SERPL CALCULATED.3IONS-SCNC: 5 MMOL/L (ref 4–13)
BUN SERPL-MCNC: 28 MG/DL (ref 5–25)
CALCIUM SERPL-MCNC: 10.2 MG/DL (ref 8.3–10.1)
CHLORIDE SERPL-SCNC: 106 MMOL/L (ref 100–108)
CO2 SERPL-SCNC: 30 MMOL/L (ref 21–32)
CREAT SERPL-MCNC: 0.98 MG/DL (ref 0.6–1.3)
GFR SERPL CREATININE-BSD FRML MDRD: 50 ML/MIN/1.73SQ M
GLUCOSE SERPL-MCNC: 78 MG/DL (ref 65–140)
POTASSIUM SERPL-SCNC: 4 MMOL/L (ref 3.5–5.3)
SODIUM SERPL-SCNC: 141 MMOL/L (ref 136–145)

## 2022-03-18 PROCEDURE — 36415 COLL VENOUS BLD VENIPUNCTURE: CPT

## 2022-03-18 PROCEDURE — 82306 VITAMIN D 25 HYDROXY: CPT

## 2022-03-18 PROCEDURE — 80048 BASIC METABOLIC PNL TOTAL CA: CPT

## 2022-03-21 ENCOUNTER — TELEPHONE (OUTPATIENT)
Dept: FAMILY MEDICINE CLINIC | Facility: CLINIC | Age: 87
End: 2022-03-21

## 2022-03-21 ENCOUNTER — HOSPITAL ENCOUNTER (OUTPATIENT)
Dept: RADIOLOGY | Age: 87
Discharge: HOME | End: 2022-03-21

## 2022-03-21 DIAGNOSIS — D38.1 NEOPLASM OF UNCERTAIN BEHAVIOR OF LEFT UPPER LOBE OF LUNG: ICD-10-CM

## 2022-03-21 NOTE — TELEPHONE ENCOUNTER
Called and left message on Vivian(daughter) voicemail, results, questions to call office     Pura Casanova

## 2022-03-21 NOTE — TELEPHONE ENCOUNTER
----- Message from Khushi Montes MD sent at 3/20/2022  5:44 PM EDT -----  Please call the patient regarding her abnormal result  Calcium level has improved

## 2022-03-25 ENCOUNTER — TELEPHONE (OUTPATIENT)
Dept: LAB | Facility: HOSPITAL | Age: 87
End: 2022-03-25

## 2022-03-25 ENCOUNTER — HOSPITAL ENCOUNTER (OUTPATIENT)
Dept: RADIOLOGY | Age: 87
Discharge: HOME | End: 2022-03-25
Payer: MEDICARE

## 2022-03-25 LAB — GLUCOSE SERPL-MCNC: 106 MG/DL (ref 65–140)

## 2022-03-25 PROCEDURE — 78815 PET IMAGE W/CT SKULL-THIGH: CPT

## 2022-03-25 PROCEDURE — G1004 CDSM NDSC: HCPCS

## 2022-03-25 PROCEDURE — 82948 REAGENT STRIP/BLOOD GLUCOSE: CPT

## 2022-03-25 PROCEDURE — A9552 F18 FDG: HCPCS

## 2022-03-28 ENCOUNTER — TELEPHONE (OUTPATIENT)
Dept: FAMILY MEDICINE CLINIC | Facility: CLINIC | Age: 87
End: 2022-03-28

## 2022-03-28 NOTE — TELEPHONE ENCOUNTER
Gaetano Tijerina called and stated that she is in need of you to put in for b/w that is to be done after the 16th ,, she stated that the lab is unable to find in chart

## 2022-03-29 NOTE — TELEPHONE ENCOUNTER
Results were discussed with patient's daughter in detail  Strong possibility of metastatic cancer  Primary lesion could be in her breast   According to patient discuss everything with the daughter  She does not want to know any results if they are not curable  Daughter would like her to be on comfort measures should her condition get any worse  She does not want any further testing or investigations or any treatment about possible metastatic cancer  She understands the benefits and risks

## 2022-04-11 ENCOUNTER — OFFICE VISIT (OUTPATIENT)
Dept: PODIATRY | Facility: CLINIC | Age: 87
End: 2022-04-11
Payer: MEDICARE

## 2022-04-11 VITALS
WEIGHT: 133 LBS | RESPIRATION RATE: 17 BRPM | DIASTOLIC BLOOD PRESSURE: 70 MMHG | HEIGHT: 61 IN | BODY MASS INDEX: 25.11 KG/M2 | SYSTOLIC BLOOD PRESSURE: 110 MMHG

## 2022-04-11 DIAGNOSIS — M79.671 PAIN IN BOTH FEET: ICD-10-CM

## 2022-04-11 DIAGNOSIS — L84 CORNS: ICD-10-CM

## 2022-04-11 DIAGNOSIS — I70.209 PERIPHERAL ARTERIOSCLEROSIS (HCC): Primary | ICD-10-CM

## 2022-04-11 DIAGNOSIS — M79.672 PAIN IN BOTH FEET: ICD-10-CM

## 2022-04-11 PROCEDURE — 11056 PARNG/CUTG B9 HYPRKR LES 2-4: CPT | Performed by: PODIATRIST

## 2022-04-11 NOTE — PROGRESS NOTES
Assessment/Plan:  Pain upon ambulation  Mycosis of nail  Clavi by formation 2nd toe bilateral   Peripheral artery disease  Paronychia hallux bilateral     Plan  Foot exam performed  Patient educated on condition  All nails debrided  Calluses debrided  Patient watch for signs of infection         Diagnoses and all orders for this visit:     Pain in both feet     Peripheral arteriosclerosis ([address] )     Corns     Onychomycosis     Paronychia of toenail, unspecified laterality            Subjective:  Patient has pain in her toes with ambulation  No history of trauma  She has pain when she wears shoes           Allergies   Allergen Reactions    Colchicine      Hydralazine Swelling       Cheeks swelled after IV Hydralazine    Tramadol              Current Outpatient Medications:     acetaminophen (TYLENOL) 325 mg tablet, Take 2 tablets by mouth every 6 (six) hours, Disp: , Rfl:     ALPRAZolam (XANAX) 0 25 mg tablet, take 1 tablet by mouth three times a day if needed for anxiety, Disp: 60 tablet, Rfl: 3    amLODIPine (NORVASC) 5 mg tablet, take 1 tablet by mouth twice a day, Disp: 180 tablet, Rfl: 1    aspirin 81 MG tablet, Take 81 mg by mouth every morning , Disp: , Rfl:     B Complex Vitamins (B-COMPLEX/B-12 PO), Take 1,500 mg by mouth 2 (two) times a day   (Patient not taking: Reported on 7/6/2021), Disp: , Rfl:     carvedilol (COREG) 12 5 mg tablet, take 1 tablet by mouth twice a day with meals, Disp: 180 tablet, Rfl: 1    chlorhexidine (PERIDEX) 0 12 % solution, RINSE WITH 15 MLS TWICE A DAY ONCE IN THE MORNING AND AT BEDTIME (Patient not taking: Reported on 1/4/2022), Disp: , Rfl:     Cranberry 00344 MG CAPS, Take 1 tablet by mouth 2 (two) times a day , Disp: , Rfl:     cyanocobalamin (VITAMIN B-12) 100 MCG tablet, Take 100 mcg by mouth daily, Disp: , Rfl:     desoximetasone (TOPICORT) 0 25 % cream, , Disp: , Rfl:     diclofenac sodium (VOLTAREN) 1 %, Apply 2 g topically 2 (two) times a day (Patient not taking: Reported on 4/8/2021), Disp: 1 Tube, Rfl: 5    losartan (COZAAR) 50 mg tablet, take 2 tablets by mouth once daily, Disp: 180 tablet, Rfl: 2    methimazole (TAPAZOLE) 5 mg tablet, 1 po Monday to fridays od  Skip on saturdays and sundays, Disp: 60 tablet, Rfl: 3    Multiple Vitamins-Minerals (PRESERVISION AREDS PO), Take 1 tablet by mouth 2 (two) times a day  (Patient not taking: Reported on 10/5/2021), Disp: , Rfl:     multivitamin (THERAGRAN) TABS, Take 1 tablet by mouth every morning  (Patient not taking: Reported on 7/6/2021), Disp: , Rfl:     multivitamin (THERAGRAN) TABS, Take 1 tablet by mouth daily (Patient not taking: Reported on 7/6/2021), Disp: , Rfl:     ranitidine (ZANTAC) 300 MG tablet, Take 300 mg by mouth daily at bedtime (Patient not taking: Reported on 7/6/2021), Disp: , Rfl:     triamterene-hydrochlorothiazide (DYAZIDE) 37 5-25 mg per capsule, take 1 capsule by mouth once daily ON MONDAY [facility] AND FRIDAY, Disp: 45 capsule, Rfl: 2    venlafaxine (EFFEXOR-XR) 150 mg 24 hr capsule, take 1 capsule by mouth once daily (TAKE WITH 75MG CAPSULE TO = 225MG TOTAL), Disp: 90 capsule, Rfl: 2         Patient Active Problem List   Diagnosis    Hypertensive urgency    Anxiety    Arthritis    Hypokalemia    Essential hypertension    CKD (chronic kidney disease) stage 3, GFR 30-59 ml/min ([facility] [address]    Hyperthyroidism    Medicare annual wellness visit, initial    Mixed anxiety depressive disorder    Ambulatory dysfunction    Hypercalcemia    Bilateral leg edema    Hyperparathyroidism ([facility] [address] )             Patient ID: Emanuel Crisostomo is a 80 y o  female      HPI     The following portions of the patient's history were reviewed and updated as appropriate:      family history includes Diabetes in her mother; Heart disease in her father; Hypertension in her daughter; Kidney disease in her brother; Ovarian cancer in her daughter        reports that she has never smoked   She has never used smokeless tobacco  She reports current alcohol use of about 1 0 standard drink of alcohol per week  She reports that she does not use drugs       Objective:  Patient's shoes and socks removed  Foot Exam     General  General Appearance: appears stated age and healthy   Orientation: alert and oriented to person, place, and time   Affect: appropriate   Gait: antalgic   Assistance: walker use         Right Foot/Ankle      Inspection and Palpation  Tenderness: metatarsals   Swelling: dorsum   Arch: pes planus  Hammertoes: fifth toe  Hallux valgus: yes  Skin Exam: callus and dry skin;      Neurovascular  Dorsalis pedis: 1+  Posterior tibial: 1+        Left Foot/Ankle       Inspection and Palpation  Tenderness: metatarsals   Swelling: dorsum   Arch: pes planus  Hammertoes: fifth toe  Hallux valgus: yes  Skin Exam: callus and dry skin;      Neurovascular  Dorsalis pedis: 1+  Posterior tibial: 1+           Physical Exam  Vitals and nursing note reviewed  Constitutional:       Appearance: Normal appearance  Cardiovascular:      Rate and Rhythm: Normal rate and regular rhythm  Pulses:           Dorsalis pedis pulses are 1+ on the right side and 1+ on the left side  Posterior tibial pulses are 1+ on the right side and 1+ on the left side  Comments: Q, 9 findings bilateral   Negative digital hair  Positive abnormal cooling bilateral  Musculoskeletal:      Right foot: Bunion present  Left foot: Bunion present  Feet:      Right foot:      Skin integrity: Callus and dry skin present  Left foot:      Skin integrity: Callus and dry skin present  Skin:     Capillary Refill: Capillary refill takes less than 2 seconds  Comments: All nails are dystrophic  They demonstrate distal mycosis  Hallux bilateral as paronychia  2nd toe bilateral has clavi    Neurological:      Mental Status: She is alert     Psychiatric:         Mood and Affect: Mood normal          Behavior: Behavior normal  Thought Content:  Thought content normal          Judgment: Judgment normal

## 2022-04-12 ENCOUNTER — CONSULT (OUTPATIENT)
Dept: ENDOCRINOLOGY | Facility: CLINIC | Age: 87
End: 2022-04-12
Payer: MEDICARE

## 2022-04-12 VITALS
TEMPERATURE: 97.3 F | BODY MASS INDEX: 25.13 KG/M2 | SYSTOLIC BLOOD PRESSURE: 112 MMHG | DIASTOLIC BLOOD PRESSURE: 70 MMHG | HEART RATE: 83 BPM | WEIGHT: 133 LBS

## 2022-04-12 DIAGNOSIS — E05.90 HYPERTHYROIDISM: Primary | ICD-10-CM

## 2022-04-12 DIAGNOSIS — E83.52 HYPERCALCEMIA: ICD-10-CM

## 2022-04-12 DIAGNOSIS — E21.3 HYPERPARATHYROIDISM (HCC): ICD-10-CM

## 2022-04-12 PROCEDURE — 99204 OFFICE O/P NEW MOD 45 MIN: CPT | Performed by: INTERNAL MEDICINE

## 2022-04-12 NOTE — PROGRESS NOTES
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 as patient had nodular thyroid gland on PET scan  -     T4, free; Future  -     TSH, 3rd generation; Future  -     T3, free; Future  -     T4, free; Future  -     TSH, 3rd generation; Future  -     T3, free; Future  -     Thyroid stimulating immunoglobulin; Future    Hyperparathyroidism ([facility] [address] )  Patient has elevated PTH, elevated calcium consistent with primary hyperparathyroidism  Vitamin-D is normal 30 8  Discussed to continue to avoid calcium supplementation  Patient is not a surgical candidate, will continue to follow wait and monitor approach, patient and her daughter also agreeable with this  Highest calcium was 11 0 in September 2021  Obtain following blood work in 4 months  Discussed with her to keep herself hydrated  -     Ambulatory referral to Endocrinology  -     Calcium, ionized; Future  -     Phosphorus Lab Collect; Future  -     Basic metabolic panel; Future  -     PTH, intact- Lab Collect; Future    Hypercalcemia  Obtain following blood work in 4 months  -     Calcium, ionized; Future  -     Phosphorus Lab Collect; Future  -     Basic metabolic panel; Future  -     PTH, intact- Lab Collect; Future        CC:   Hyperthyroidism, hyperparathyroidism,?  parathyroid adenoma    History of Present Illness     HPI:    Darylene Reams is 61-year-old woman with medical history of elevated PTH, elevated calcium, hyperthyroidism is referred here for evaluation of thyroid problem as well as hyperparathyroidism    Patient had parathyroid scan which showed, 5 cm irregular left lobe lung mass, indeterminate 3 9 cm right paramedian anterior chest wall mass, focal tracer activity at left upper thyroid lobe on delayed imaging which could reflect parathyroid adenoma  In large nodular thyroid gland limits evaluation of parathyroid adenoma  Patient had PET scan on March 25, 2022 which showed hypermetabolic lesion in anterior axilla and right mandible may be inflammatory or infectious CABG in, hypermetabolic nodular thyroid particularly the left lobe primary thyroid neoplasm is not excluded, left adnexal cyst, bilateral renal cyst, 4 9 x 5 4 x 4 5 cm long hypermetabolic mass  Patient was found to elevated calcium since September 2021, his calcium was 11 0 on September 2038 2021  Most recent calcium was 10 2, PTH was 89 1 in November 2021  No history of kidney stones  Patient has not completed DEXA scan yet, does not have history of osteoporosis or fragility fractures  She does not take calcium supplementation at present    She has history of hyperthyroidism for which currently taking methimazole 5 mg, 1 tablet daily except Sunday and Saturday she does not take any medication  Denies side effects  Last TSH is 0 01, free T4 is 1 02 and T3 total is 1 2 from September 2021    Results for Mary Grace Baker (MRN [id number]) as of 4/12/2022 14:09   Ref  Range 9/30/2021 08:28   TSH 3RD GENERATON Latest Ref Range: 0 358 - 3 740 uIU/mL 0 011 (L)   Free T4 Latest Ref Range: 0 76 - 1 46 ng/dL 1 02   T3, Total Latest Ref Range: 0 60 - 1 80 ng/mL 1 20     Results for Mary Grace Baker (MRN [id number]) as of 4/12/2022 14:09   Ref  Range 3/18/2022 12:30   Vit D, 25-Hydroxy Latest Ref Range: 30 0 - 100 0 ng/mL 30 8     *Results for Mary Grace Baker (MRN [id number]) as of 4/12/2022 13:56   Ref   Range 3/18/2022 12:30   Sodium Latest Ref Range: 136 - 145 mmol/L 141   Potassium Latest Ref Range: 3 5 - 5 3 mmol/L 4 0   Chloride Latest Ref Range: 100 - 108 mmol/L 106   CO2 Latest Ref Range: 21 - 32 mmol/L 30   Anion Gap Latest Ref Range: 4 - 13 mmol/L 5   BUN Latest Ref Range: 5 - 25 mg/dL 28 (H)   Creatinine Latest Ref Range: 0 60 - 1 30 mg/dL 0 98   Glucose, Random Latest Ref Range: 65 - 140 mg/dL 78   Calcium Latest Ref Range: 8 3 - 10 1 mg/dL 10 2 (H)   eGFR Latest Units: ml/min/1 73sq m 50   Vit D, 25-Hydroxy Latest Ref Range: 30 0 - 100 0 ng/mL 30 8     Results for Gretchen Madera (MRN [id number]) as of 4/12/2022 13:56   Ref  Range 11/18/2021 14:29   PARATHYROID HORMONE Latest Ref Range: 18 4 - 80 1 pg/mL 89 1 (H)     Review of Systems   Constitutional: Positive for fatigue  Negative for activity change, diaphoresis, fever and unexpected weight change  HENT: Positive for dental problem and sinus pressure  Eyes: Negative for visual disturbance  Respiratory: Negative for cough, chest tightness and shortness of breath  Cardiovascular: Negative for chest pain, palpitations and leg swelling  Gastrointestinal: Negative for abdominal pain, constipation, diarrhea, nausea and vomiting  Endocrine: Negative for cold intolerance, heat intolerance, polydipsia, polyphagia and polyuria  Genitourinary: Positive for frequency and urgency  Negative for dysuria and enuresis  Musculoskeletal: Positive for arthralgias and myalgias  Skin: Negative for pallor, rash and wound  Allergic/Immunologic: Negative  Neurological: Negative for dizziness, tremors, weakness and numbness  Hematological: Negative  Psychiatric/Behavioral: Negative          Historical Information   Past Medical History:   Diagnosis Date    Allergic     Anxiety     Arthritis     left knee    Cancer (HCC)     skin: face, nose and back (100+ sutures)    Chronic kidney disease     stage 2    Coronary artery disease     Depression     Disease of thyroid gland     Edema     Per [place]     Generalized headaches     Per Freda Johnston     GERD (gastroesophageal reflux disease)     Per [place]     Hiatal hernia     History of echocardiogram 02/2016 Stress Echo 3/15/16- Normal stress echo with no echocardiographic evidence of myocardial infraction or myocardial ischemia  This was technically difficult study  Echo 2/9/2016- Mild LVH  EF of 50-55%  LV diastolic noncompliance  Mitral annular calcification with 2+ MR  Normal opening of the cardiac valves    History of EKG 10/20/2017     Normal sinus rhythm, possible left atrial enlargement  Borderline ECG  EKG 7/28/17- Sinus rhytm with 1st degree AV block  Cannot rule out inferior infarct, age undetermined  Abnormal ECG  EKG 1/13/17- Normal sinus rhythm, nonspecific T wave abnormality  Abnormal ECG  EKG 2/26/16- Sinus rhythm with 1st degree AV block  Possible left atrial enlargement  Cannot rule out inferior infarct, age Geroldine Cruise History of Holter monitoring 12/22/2015    The baseline rhythm was of sinus origin with an average rate of 72bpm, (range: 59-98bpm)  There were frequant single uniform PVCs (averaging over 1600 beats per hour)  There were a few episodes of asymptomatic sinus arrhythmia with sinus bradycardia  There were a few PACs (averaging 8 4 beats per hour)  Aside from sinus arrhythmia, there were no other sustained cardiac dysrhythmias   History of stress test 03/15/2016     Negative EKG part of stress echo at 100% age predicted maximum heart rate  Frequant PVC's in the pattern of ventricular bigeminy at rest and in recovery with significant suppression during exercise  No evidence of complex dysrhythmias  Resting hypertension with normal blood pressure response to exercise  Exaggreated heart rate response to exercise   Cardiolite 1/8/16- Moderately abnormal strudy wi    [place] (hard of hearing)     bilat HA    Hypertension     Macular degeneration     Murmur, heart     Osteoporosis     Per [place]     Subclinical hyperthyroidism     Thyroid nodule     right thyroid nodule (neg biopsy, pe post) -Per Parmjit Walker     Vision problems     mac degenaration- Per Parmjit Walker      Past Surgical History: Procedure Laterality Date    APPENDECTOMY      age 15   Jeral Hose EYE SURGERY Left     Cataract with IOL    JOINT REPLACEMENT Left 2010    knee    [place] XCAPSL CTRC RMVL INSJ IO LENS PROSTH W/O ECP Right 8/8/2016    Procedure: EXTRACTION EXTRACAPSULAR CATARACT PHACO INTRAOCULAR LENS (IOL); Surgeon: Vivek Okeefe MD;  [facility] MA[facility];  Service: Ophthalmology    RESECTION TONSIL RADICAL      SKIN BIOPSY      face, back, right leg    SPLENECTOMY, TOTAL       Social History   Social History     Substance and Sexual Activity   Alcohol Use Yes    Alcohol/week: 1 0 standard drink    Types: 1 Glasses of wine per week    Comment: rarely     Social History     Substance and Sexual Activity   Drug Use No     Social History     Tobacco Use   Smoking Status Never Smoker   Smokeless Tobacco Never Used     Family History:   Family History   Problem Relation Age of Onset    Diabetes Mother     Heart disease Father         massive MI exp 72    Kidney disease Brother     Ovarian cancer Daughter         Ovarian cancer, disseminated - Per [place]     Hypertension Daughter         Per [place]        Meds/Allergies   Current Outpatient Medications   Medication Sig Dispense Refill    acetaminophen (TYLENOL) 325 mg tablet Take 2 tablets by mouth every 6 (six) hours      ALPRAZolam (XANAX) 0 25 mg tablet take 1 tablet by mouth three times a day if needed for anxiety 60 tablet 3    amLODIPine (NORVASC) 5 mg tablet Take 1 tablet (5 mg total) by mouth 2 (two) times a day 180 tablet 0    aspirin 81 MG tablet Take 81 mg by mouth every morning   B Complex Vitamins (B-COMPLEX/B-12 PO) Take 1,500 mg by mouth 2 (two) times a day        carvedilol (COREG) 12 5 mg tablet Take 1 tablet (12 5 mg total) by mouth 2 (two) times a day with meals 180 tablet 0    Cranberry 60927 MG CAPS Take 1 tablet by mouth 2 (two) times a day        cyanocobalamin (VITAMIN B-12) 100 MCG tablet Take 100 mcg by mouth daily      losartan (COZAAR) 50 mg tablet take 2 tablets by mouth once daily 180 tablet 2    methimazole (TAPAZOLE) 5 mg tablet take 1 tablet by mouth once daily MONDAY TO [facility] (Patient taking differently: take 1 tablet by mouth once daily MONDAY Wednesday & FRIDAY SKIP ON SATURDAYS AND SUNDAYS ) 60 tablet 12    Multiple Vitamins-Minerals (PRESERVISION AREDS 2 PO) Take by mouth 2 (two) times a day      triamterene-hydrochlorothiazide (DYAZIDE) 37 5-25 mg per capsule Take 1 capsule by mouth every morning PRN      venlafaxine (EFFEXOR-XR) 150 mg 24 hr capsule Take 150 mg by mouth daily PRN      chlorhexidine (PERIDEX) 0 12 % solution RINSE WITH 15 MLS TWICE A DAY ONCE IN THE MORNING AND AT BEDTIME (Patient not taking: Reported on 1/4/2022)      desoximetasone (TOPICORT) 0 25 % cream  (Patient not taking: Reported on 3/8/2022 )      diclofenac sodium (VOLTAREN) 1 % Apply 2 g topically 2 (two) times a day (Patient not taking: Reported on 4/8/2021) 1 Tube 5    Multiple Vitamins-Minerals (PRESERVISION AREDS PO) Take 1 tablet by mouth 2 (two) times a day  (Patient not taking: Reported on 10/5/2021)      multivitamin (THERAGRAN) TABS Take 1 tablet by mouth every morning  (Patient not taking: Reported on 7/6/2021)      multivitamin [facility]) TABS Take 1 tablet by mouth daily (Patient not taking: Reported on 7/6/2021)      ranitidine (ZANTAC) 300 MG tablet Take 300 mg by mouth daily at bedtime (Patient not taking: Reported on 7/6/2021)       No current facility-administered medications for this visit  Allergies   Allergen Reactions    Colchicine     Hydralazine Swelling     Cheeks swelled after IV Hydralazine    Tramadol        Objective   Vitals: Blood pressure 112/70, pulse 83, temperature (!) 97 3 °F (36 3 °C), weight 60 3 kg (133 lb)  Physical Exam  Vitals reviewed  Constitutional:       General: She is not in acute distress  Appearance: She is well-developed  She is not diaphoretic     HENT: Head: Normocephalic and atraumatic  Comments: She has fullness in left maxillary area  Eyes:      General:         Right eye: No discharge  Left eye: No discharge  Conjunctiva/sclera: Conjunctivae normal    Neck:      Thyroid: No thyromegaly  Cardiovascular:      Rate and Rhythm: Normal rate and regular rhythm  Heart sounds: Normal heart sounds  No murmur heard  Pulmonary:      Effort: Pulmonary effort is normal  No respiratory distress  Breath sounds: Normal breath sounds  No wheezing  Abdominal:      General: Bowel sounds are normal       Palpations: Abdomen is soft  Musculoskeletal:         General: No tenderness or deformity  Normal range of motion  Cervical back: Normal range of motion and neck supple  Skin:     General: Skin is warm and dry  Findings: No erythema or rash  Neurological:      General: No focal deficit present  Mental Status: She is alert and oriented to person, place, and time  Cranial Nerves: No cranial nerve deficit  Motor: No abnormal muscle tone  Deep Tendon Reflexes: Reflexes are normal and symmetric  Reflexes normal    Psychiatric:         Mood and Affect: Mood normal          Behavior: Behavior normal          The history was obtained from the review of the chart, patient  Lab Results:   Lab Results   Component Value Date/Time    TSH 3RD GENERATON 0 011 (L) 09/30/2021 08:28 AM    Free T4 1 02 09/30/2021 08:28 AM       Imaging Studies:       I have personally reviewed pertinent reports  Portions of the record may have been created with voice recognition software  Occasional wrong word or "sound a like" substitutions may have occurred due to the inherent limitations of voice recognition software  Read the chart carefully and recognize, using context, where substitutions have occurred

## 2022-04-14 ENCOUNTER — TELEPHONE (OUTPATIENT)
Dept: LAB | Facility: HOSPITAL | Age: 87
End: 2022-04-14

## 2022-04-15 ENCOUNTER — HOSPITAL ENCOUNTER (EMERGENCY)
Facility: HOSPITAL | Age: 87
Discharge: HOME | End: 2022-04-15
Attending: EMERGENCY MEDICINE | Admitting: EMERGENCY MEDICINE
Payer: MEDICARE

## 2022-04-15 ENCOUNTER — APPOINTMENT (EMERGENCY)
Dept: RADIOLOGY | Facility: HOSPITAL | Age: 87
End: 2022-04-15
Payer: MEDICARE

## 2022-04-15 VITALS
RESPIRATION RATE: 18 BRPM | OXYGEN SATURATION: 95 % | HEART RATE: 76 BPM | SYSTOLIC BLOOD PRESSURE: 135 MMHG | DIASTOLIC BLOOD PRESSURE: 82 MMHG | TEMPERATURE: 98.3 F

## 2022-04-15 DIAGNOSIS — S61.412A LACERATION OF LEFT HAND WITHOUT FOREIGN BODY, INITIAL ENCOUNTER: Primary | ICD-10-CM

## 2022-04-15 PROCEDURE — 90715 TDAP VACCINE 7 YRS/> IM: CPT | Performed by: EMERGENCY MEDICINE

## 2022-04-15 PROCEDURE — 99284 EMERGENCY DEPT VISIT MOD MDM: CPT | Performed by: EMERGENCY MEDICINE

## 2022-04-15 PROCEDURE — 12002 RPR S/N/AX/GEN/TRNK2.6-7.5CM: CPT | Performed by: EMERGENCY MEDICINE

## 2022-04-15 PROCEDURE — 73130 X-RAY EXAM OF HAND: CPT

## 2022-04-15 PROCEDURE — 99283 EMERGENCY DEPT VISIT LOW MDM: CPT

## 2022-04-15 PROCEDURE — 90471 IMMUNIZATION ADMIN: CPT

## 2022-04-15 RX ORDER — LIDOCAINE HYDROCHLORIDE 10 MG/ML
10 INJECTION, SOLUTION EPIDURAL; INFILTRATION; INTRACAUDAL; PERINEURAL ONCE
Status: COMPLETED | OUTPATIENT
Start: 2022-04-15 | End: 2022-04-15

## 2022-04-15 RX ADMIN — TETANUS TOXOID, REDUCED DIPHTHERIA TOXOID AND ACELLULAR PERTUSSIS VACCINE, ADSORBED 0.5 ML: 5; 2.5; 8; 8; 2.5 SUSPENSION INTRAMUSCULAR at 17:13

## 2022-04-15 RX ADMIN — LIDOCAINE HYDROCHLORIDE 10 ML: 10 INJECTION, SOLUTION EPIDURAL; INFILTRATION; INTRACAUDAL at 17:10

## 2022-04-15 NOTE — ED PROVIDER NOTES
History  Chief Complaint   Patient presents with    Hand Laceration     Patient arrives to the ER from home with complaints of left hand injury  Pt  states she cut her left hand on walker  unknown tetanus status  PMS intact to LUE/ left hand  HPI     70-year-old female presenting for evaluation of a laceration to her left palm  Patient was walking out of her home with her walker when she caught the palm of her hand on a piece of metal on her storm door  She states the skin became talked by the door and she had to pull it off producing a v-shaped laceration  She gradually lowered herself to the ground after the incident and denies actually falling  No head strike  Denies any new areas of pain since the incident  She is right handed  She has been able to ambulate since the incident  Prior to Admission Medications   Prescriptions Last Dose Informant Patient Reported? Taking? ALPRAZolam (XANAX) 0 25 mg tablet   No No   Sig: take 1 tablet by mouth three times a day if needed for anxiety   B Complex Vitamins (B-COMPLEX/B-12 PO)   Yes No   Sig: Take 1,500 mg by mouth 2 (two) times a day     Cranberry 81516 MG CAPS   Yes No   Sig: Take 1 tablet by mouth 2 (two) times a day  Multiple Vitamins-Minerals (PRESERVISION AREDS 2 PO)  Self Yes No   Sig: Take by mouth 2 (two) times a day   Multiple Vitamins-Minerals (PRESERVISION AREDS PO)   Yes No   Sig: Take 1 tablet by mouth 2 (two) times a day  Patient not taking: Reported on 10/5/2021   acetaminophen (TYLENOL) 325 mg tablet   Yes No   Sig: Take 2 tablets by mouth every 6 (six) hours   amLODIPine (NORVASC) 5 mg tablet   No No   Sig: Take 1 tablet (5 mg total) by mouth 2 (two) times a day   aspirin 81 MG tablet   Yes No   Sig: Take 81 mg by mouth every morning     carvedilol (COREG) 12 5 mg tablet   No No   Sig: Take 1 tablet (12 5 mg total) by mouth 2 (two) times a day with meals   chlorhexidine (PERIDEX) 0 12 % solution   Yes No   Sig: RINSE WITH 15 MLS TWICE A DAY ONCE IN THE MORNING AND AT BEDTIME   Patient not taking: Reported on 1/4/2022   cyanocobalamin (VITAMIN B-12) 100 MCG tablet   Yes No   Sig: Take 100 mcg by mouth daily   desoximetasone (TOPICORT) 0 25 % cream   Yes No   Patient not taking: Reported on 3/8/2022    diclofenac sodium (VOLTAREN) 1 %   No No   Sig: Apply 2 g topically 2 (two) times a day   Patient not taking: Reported on 4/8/2021   losartan (COZAAR) 50 mg tablet   No No   Sig: take 2 tablets by mouth once daily   methimazole (TAPAZOLE) 5 mg tablet   No No   Sig: take 1 tablet by mouth once daily MONDAY TO [facility]   Patient taking differently: take 1 tablet by mouth once daily MONDAY Wednesday & FRIDAY SKIP ON SATURDAYS AND SUNDAYS    multivitamin (THERAGRAN) TABS   Yes No   Sig: Take 1 tablet by mouth every morning     Patient not taking: Reported on 7/6/2021   multivitamin (THERAGRAN) TABS   Yes No   Sig: Take 1 tablet by mouth daily   Patient not taking: Reported on 7/6/2021   ranitidine (ZANTAC) 300 MG tablet   Yes No   Sig: Take 300 mg by mouth daily at bedtime   Patient not taking: Reported on 7/6/2021   triamterene-hydrochlorothiazide (DYAZIDE) 37 5-25 mg per capsule   Yes No   Sig: Take 1 capsule by mouth every morning PRN   venlafaxine (EFFEXOR-XR) 150 mg 24 hr capsule   Yes No   Sig: Take 150 mg by mouth daily PRN      Facility-Administered Medications: None       Past Medical History:   Diagnosis Date    Allergic     Anxiety     Arthritis     left knee    Cancer ([facility] [address] )     skin: face, nose and back (100+ sutures)    Chronic kidney disease     stage 2    Coronary artery disease     Depression     Disease of thyroid gland     Edema     Per [place]     Generalized headaches     Per [place]     GERD (gastroesophageal reflux disease)     Per [place]     Hiatal hernia     History of echocardiogram 02/2016    Stress Echo 3/15/16- Normal stress echo with no echocardiographic evidence of myocardial infraction or myocardial ischemia  This was technically difficult study  Echo 2/9/2016- Mild LVH  EF of 50-55%  LV diastolic noncompliance  Mitral annular calcification with 2+ MR  Normal opening of the cardiac valves    History of EKG 10/20/2017     Normal sinus rhythm, possible left atrial enlargement  Borderline ECG  EKG 7/28/17- Sinus rhytm with 1st degree AV block  Cannot rule out inferior infarct, age undetermined  Abnormal ECG  EKG 1/13/17- Normal sinus rhythm, nonspecific T wave abnormality  Abnormal ECG  EKG 2/26/16- Sinus rhythm with 1st degree AV block  Possible left atrial enlargement  Cannot rule out inferior infarct, age Danny Heymann History of Holter monitoring 12/22/2015    The baseline rhythm was of sinus origin with an average rate of 72bpm, (range: 59-98bpm)  There were frequant single uniform PVCs (averaging over 1600 beats per hour)  There were a few episodes of asymptomatic sinus arrhythmia with sinus bradycardia  There were a few PACs (averaging 8 4 beats per hour)  Aside from sinus arrhythmia, there were no other sustained cardiac dysrhythmias   History of stress test 03/15/2016     Negative EKG part of stress echo at 100% age predicted maximum heart rate  Frequant PVC's in the pattern of ventricular bigeminy at rest and in recovery with significant suppression during exercise  No evidence of complex dysrhythmias  Resting hypertension with normal blood pressure response to exercise  Exaggreated heart rate response to exercise   Cardiolite 1/8/16- Moderately abnormal strudy wi    [place] (hard of hearing)     bilat HA    Hypertension     Macular degeneration     Murmur, heart     Osteoporosis     Per [place]     Subclinical hyperthyroidism     Thyroid nodule     right thyroid nodule (neg biopsy, pe post) -Per Molly Page     Vision problems     mac degenaration- Per Molly Page        Past Surgical History:   Procedure Laterality Date    APPENDECTOMY      age 15   [facility] Left     Cataract with IOL  JOINT REPLACEMENT Left 2010    knee    TN XCAPSL CTRC RMVL INSJ IO LENS PROSTH W/O ECP Right 8/8/2016    Procedure: EXTRACTION EXTRACAPSULAR CATARACT PHACO INTRAOCULAR LENS (IOL); Surgeon: Vivek Okeefe MD;  Location: [facility] MAIN OR;  Service: Ophthalmology    RESECTION TONSIL RADICAL      SKIN BIOPSY      face, back, right leg    SPLENECTOMY, TOTAL         Family History   Problem Relation Age of Onset    Diabetes Mother     Heart disease Father         massive MI exp 72    Kidney disease Brother     Ovarian cancer Daughter         Ovarian cancer, disseminated - Per [place]     Hypertension Daughter         Per [place]      I have reviewed and agree with the history as documented  E-Cigarette/Vaping    E-Cigarette Use Never User      E-Cigarette/Vaping Substances    Nicotine No     THC No     CBD No     Flavoring No     Other No     Unknown No      Social History     Tobacco Use    Smoking status: Never Smoker    Smokeless tobacco: Never Used   Vaping Use    Vaping Use: Never used   Substance Use Topics    Alcohol use: Yes     Alcohol/week: 1 0 standard drink     Types: 1 Glasses of wine per week     Comment: rarely    Drug use: No       Review of Systems   Constitutional: Negative for chills and fever  HENT: Negative for facial swelling  Eyes: Negative for visual disturbance  Respiratory: Negative for shortness of breath  Cardiovascular: Negative for chest pain  Gastrointestinal: Negative for abdominal pain  Genitourinary: Negative for flank pain  Musculoskeletal: Negative for arthralgias, back pain, neck pain and neck stiffness  Skin: Positive for wound (V shaped laceration to the L palm)  Negative for rash  Neurological: Negative for weakness, numbness and headaches  Psychiatric/Behavioral: Negative for agitation, behavioral problems and confusion  Physical Exam  Physical Exam  Vitals reviewed     Constitutional:       General: She is not in acute distress  Appearance: Normal appearance  She is not ill-appearing or toxic-appearing  HENT:      Head: Normocephalic and atraumatic  Right Ear: External ear normal       Left Ear: External ear normal       Nose: Nose normal    Eyes:      Conjunctiva/sclera: Conjunctivae normal    Neck:      Comments: No midline tenderness to palpation over the cervical spine  Cardiovascular:      Rate and Rhythm: Normal rate  Pulmonary:      Effort: Pulmonary effort is normal  No respiratory distress  Abdominal:      General: There is no distension  Musculoskeletal:         General: No deformity  Normal range of motion  Cervical back: Normal range of motion  Comments: No midline tenderness to palpation over the T or L-spine  No tenderness to palpation to the bilateral hips  Extremities are atraumatic with the exception of the laceration to the left hand  Tenderness to palpation over the left 3rd and 4th metacarpal bones without swelling or palpable deformity   Skin:     General: Skin is warm and dry  Neurological:      General: No focal deficit present  Mental Status: She is alert and oriented to person, place, and time        Comments: Intact sensation to light touch in the radial, median, and ulnar nerve distributions of the left hand   Psychiatric:         Mood and Affect: Mood normal          Vital Signs  ED Triage Vitals [04/15/22 1425]   Temperature Pulse Respirations Blood Pressure SpO2   98 3 °F (36 8 °C) 76 18 135/82 95 %      Temp src Heart Rate Source Patient Position - Orthostatic VS BP Location FiO2 (%)   -- -- -- -- --      Pain Score       --           Vitals:    04/15/22 1425   BP: 135/82   Pulse: 76         Visual Acuity      ED Medications  Medications   lidocaine (PF) (XYLOCAINE-MPF) 1 % injection 10 mL (10 mL Infiltration Given 4/15/22 1710)   tetanus-diphtheria-acellular pertussis (BOOSTRIX) IM injection 0 5 mL (0 5 mL Intramuscular Given 4/15/22 1713)       Diagnostic Studies  Results Reviewed     None                 XR hand 3+ views LEFT   ED Interpretation by Etta Álvarez MD (04/15 1820)   No acute fracture or malalignment  Procedures  Laceration repair    Date/Time: 4/15/2022 7:03 PM  Performed by: Etta Álvarez MD  Authorized by: Etta Álvarez MD   Consent: Verbal consent obtained  Risks and benefits: risks, benefits and alternatives were discussed  Consent given by: patient  Body area: upper extremity  Location details: left hand  Laceration length: 4 (v-shaped laceration) cm  Foreign bodies: no foreign bodies  Tendon involvement: none  Nerve involvement: none  Vascular damage: no  Anesthesia: local infiltration    Anesthesia:  Local Anesthetic: lidocaine 1% without epinephrine  Anesthetic total: 10 mL    Sedation:  Patient sedated: no      Wound Dehiscence:  Superficial Wound Dehiscence: simple closure      Procedure Details:  Irrigation solution: saline  Irrigation method: syringe  Amount of cleaning: extensive  Debridement: none  Degree of undermining: none  Wound skin closure material used: 5-0 ethilon  Number of sutures: 8  Technique: simple  Approximation: close  Approximation difficulty: simple  Dressing: gauze roll  Patient tolerance: patient tolerated the procedure well with no immediate complications               ED Course  ED Course as of 04/15/22 1904   Fri Apr 15, 2022   1820 X-ray of the hand with no acute fracture or malalignment to suggest open fracture  No other signs of trauma on exam   Tetanus status updated  Laceration repaired according to the procedure note above  Signs and symptoms of infection discussed with the patient and her daughter as reasons to return to the emergency department  Stitches to be removed in 7-10 days with her PCP                                               MDM  Number of Diagnoses or Management Options  Laceration of left hand without foreign body, initial encounter: new and requires workup  Diagnosis management comments: Please see ED course above for details of the Medical Decision Making  Amount and/or Complexity of Data Reviewed  Tests in the radiology section of CPT®: ordered and reviewed  Independent visualization of images, tracings, or specimens: yes    Patient Progress  Patient progress: stable      Disposition  Final diagnoses:   Laceration of left hand without foreign body, initial encounter     Time reflects when diagnosis was documented in both MDM as applicable and the Disposition within this note     Time User Action Codes Description Comment    4/15/2022  6:21 PM Trevor Wong Add [V52 [id number]] Laceration of left hand without foreign body, initial encounter       ED Disposition     ED Disposition Condition Date/Time Comment    Discharge Stable Fri Apr 15, 2022  6:21 PM [address] discharge to home/self care  Follow-up Information     Follow up With Specialties Details Why Contact Info Additional Information    Jason Golden MD Internal Medicine  Please follow-up with your doctor in 7-10 days for removal of your stitches  Alternatively, your stitches can be removed at an urgent care or here in the Emergency Department  [address] Emergency Department Emergency Medicine  As we discussed, return to the Emergency Department for redness or swelling around your wound, drainage that looks like pus, or severe pain with movement of your fingers   [address] [facility] [id number] [place] [address] Emergency Department, [address], [place], [place], [address]          Discharge Medication List as of 4/15/2022  6:22 PM      CONTINUE these medications which have NOT CHANGED    Details   acetaminophen (TYLENOL) 325 mg tablet Take 2 tablets by mouth every 6 (six) hours, Historical Med      ALPRAZolam (XANAX) 0 25 mg tablet take 1 tablet by mouth three times a day if needed for anxiety, Normal      amLODIPine (NORVASC) 5 mg tablet Take 1 tablet (5 mg total) by mouth 2 (two) times a day, Starting Mon 2/7/2022, Normal      aspirin 81 MG tablet Take 81 mg by mouth every morning , Until Discontinued, Historical Med      B Complex Vitamins (B-COMPLEX/B-12 PO) Take 1,500 mg by mouth 2 (two) times a day  , Historical Med      carvedilol (COREG) 12 5 mg tablet Take 1 tablet (12 5 mg total) by mouth 2 (two) times a day with meals, Starting Mon 2/7/2022, Normal      chlorhexidine (PERIDEX) 0 12 % solution RINSE WITH 15 MLS TWICE A DAY ONCE IN THE MORNING AND AT BEDTIME, Historical Med      Cranberry 39728 MG CAPS Take 1 tablet by mouth 2 (two) times a day , Until Discontinued, Historical Med      cyanocobalamin (VITAMIN B-12) 100 MCG tablet Take 100 mcg by mouth daily, Historical Med      desoximetasone (TOPICORT) 0 25 % cream Starting Thu 7/2/2020, Historical Med      diclofenac sodium (VOLTAREN) 1 % Apply 2 g topically 2 (two) times a day, Starting Tue 10/6/2020, Normal      losartan (COZAAR) 50 mg tablet take 2 tablets by mouth once daily, Normal      methimazole (TAPAZOLE) 5 mg tablet take 1 tablet by mouth once daily MONDAY TO FRIDAY SKIP ON SATURDAYS AND SUNDAYS, Normal      !!  Multiple Vitamins-Minerals (PRESERVISION AREDS 2 PO) Take by mouth 2 (two) times a day, Historical Med      !! Multiple Vitamins-Minerals (PRESERVISION AREDS PO) Take 1 tablet by mouth 2 (two) times a day , Until Discontinued, Historical Med      !! multivitamin (THERAGRAN) TABS Take 1 tablet by mouth every morning , Until Discontinued, Historical Med      !! multivitamin (THERAGRAN) TABS Take 1 tablet by mouth daily, Historical Med      ranitidine (ZANTAC) 300 MG tablet Take 300 mg by mouth daily at bedtime, Historical Med      triamterene-hydrochlorothiazide (DYAZIDE) 37 5-25 mg per capsule Take 1 capsule by mouth every morning PRN, Historical Med venlafaxine (EFFEXOR-XR) 150 mg 24 hr capsule Take 150 mg by mouth daily PRN, Historical Med       !! - Potential duplicate medications found  Please discuss with provider  No discharge procedures on file      PDMP Review       Value Time User    PDMP Reviewed  Yes 11/1/2021 11:43 AM Anna Marie Bower MD          ED Provider  Electronically Signed by           Fito Beckham MD  04/15/22 [address] [place] [address] Armaan Mohan MD  04/15/22 [id number]

## 2022-04-19 ENCOUNTER — OFFICE VISIT (OUTPATIENT)
Dept: FAMILY MEDICINE CLINIC | Facility: CLINIC | Age: 87
End: 2022-04-19
Payer: MEDICARE

## 2022-04-19 VITALS
HEIGHT: 61 IN | OXYGEN SATURATION: 96 % | DIASTOLIC BLOOD PRESSURE: 70 MMHG | HEART RATE: 68 BPM | WEIGHT: 131.6 LBS | BODY MASS INDEX: 24.84 KG/M2 | RESPIRATION RATE: 16 BRPM | TEMPERATURE: 96.8 F | SYSTOLIC BLOOD PRESSURE: 130 MMHG

## 2022-04-19 DIAGNOSIS — Z00.00 MEDICARE ANNUAL WELLNESS VISIT, INITIAL: Primary | ICD-10-CM

## 2022-04-19 DIAGNOSIS — R91.8 MASS OF UPPER LOBE OF LEFT LUNG: ICD-10-CM

## 2022-04-19 PROCEDURE — 1123F ACP DISCUSS/DSCN MKR DOCD: CPT | Performed by: INTERNAL MEDICINE

## 2022-04-19 PROCEDURE — G0439 PPPS, SUBSEQ VISIT: HCPCS | Performed by: INTERNAL MEDICINE

## 2022-04-19 NOTE — ASSESSMENT & PLAN NOTE
patient does nt want to know about her results  Discussed with daughter- no further work up as per daughter and family

## 2022-04-19 NOTE — PROGRESS NOTES
Saul Hamilton is here for her Subsequent Wellness visit  Last Medicare Wellness visit information reviewed, patient interviewed, no change since last AWV  Health Risk Assessment:   Patient rates overall health as good  Patient feels that their physical health rating is same  Patient is satisfied with their life  Eyesight was rated as same  Hearing was rated as same  Patient feels that their emotional and mental health rating is same  Patients states they are never, rarely angry  Patient states they are sometimes unusually tired/fatigued  Pain experienced in the last 7 days has been none  Patient states that she has experienced no weight loss or gain in last 6 months  Depression Screening:   PHQ-9 Score: 3      Fall Risk Screening: In the past year, patient has experienced: no history of falling in past year      Urinary Incontinence Screening:   Patient has leaked urine accidently in the last six months  Wears pads everyday and at night    Home Safety:  Patient has trouble with stairs inside or outside of their home  Patient has working smoke alarms and has no working carbon monoxide detector  Home safety hazards include: loose rugs on the floor  Nutrition:   Current diet is Regular and Limited junk food  Medications:   Patient is not currently taking any over-the-counter supplements  Patient is able to manage medications  Activities of Daily Living (ADLs)/Instrumental Activities of Daily Living (IADLs):   Walk and transfer into and out of bed and chair?: Yes  Dress and groom yourself?: Yes    Bathe or shower yourself?: Yes    Feed yourself?  Yes  Do your laundry/housekeeping?: No  Manage your money, pay your bills and track your expenses?: Yes  Make your own meals?: Yes    Do your own shopping?: Yes    ADL comments: Help with bathing, daughter does laundry daughter helps with finances    Previous Hospitalizations:   Any hospitalizations or ED visits within the last 12 months?: Yes    How many hospitalizations have you had in the last year?: 1-2    Advance Care Planning:   Living will: Yes    Durable POA for healthcare: Yes    Advanced directive: Yes      Cognitive Screening:   Provider or family/friend/caregiver concerned regarding cognition?: No    PREVENTIVE SCREENINGS      Cardiovascular Screening:    General: Screening Current      Diabetes Screening:     General: Screening Current      Colorectal Cancer Screening:     General: Screening Not Indicated      Breast Cancer Screening:     General: Screening Not Indicated      Cervical Cancer Screening:    General: Screening Not Indicated      Osteoporosis Screening:    General: Screening Not Indicated      Abdominal Aortic Aneurysm (AAA) Screening:        General: Screening Not Indicated      Lung Cancer Screening:     General: Screening Not Indicated and History Lung Cancer      Hepatitis C Screening:    General: Screening Current    Screening, Brief Intervention, and Referral to Treatment (SBIRT)    Screening  Typical number of drinks in a day: 0  Typical number of drinks in a week: 0  Interpretation: Low risk drinking behavior  AUDIT-C Screenin) How often did you have a drink containing alcohol in the past year? never  2) How many drinks did you have on a typical day when you were drinking in the past year? 0  3) How often did you have 6 or more drinks on one occasion in the past year? never    AUDIT-C Score: 0  Interpretation: Score 0-2 (female): Negative screen for alcohol misuse    Single Item Drug Screening:  How often have you used an illegal drug (including marijuana) or a prescription medication for non-medical reasons in the past year? never    Single Item Drug Screen Score: 0  Interpretation: Negative screen for possible drug use disorder    Brief Intervention  Alcohol & drug use screenings were reviewed  No concerns regarding substance use disorder identified       Other Counseling Topics:   Car/seat belt/driving safety, skin self-exam, sunscreen and regular weightbearing exercise

## 2022-04-21 ENCOUNTER — TELEPHONE (OUTPATIENT)
Dept: OTHER | Facility: OTHER | Age: 87
End: 2022-04-21

## 2022-04-21 ENCOUNTER — APPOINTMENT (OUTPATIENT)
Dept: LAB | Facility: HOSPITAL | Age: 87
End: 2022-04-21
Attending: INTERNAL MEDICINE
Payer: MEDICARE

## 2022-04-21 ENCOUNTER — TELEPHONE (OUTPATIENT)
Dept: ENDOCRINOLOGY | Facility: CLINIC | Age: 87
End: 2022-04-21

## 2022-04-21 DIAGNOSIS — E21.3 HYPERPARATHYROIDISM (HCC): Primary | ICD-10-CM

## 2022-04-21 DIAGNOSIS — E05.90 HYPERTHYROIDISM: ICD-10-CM

## 2022-04-21 DIAGNOSIS — E21.3 HYPERPARATHYROIDISM (HCC): ICD-10-CM

## 2022-04-21 DIAGNOSIS — E83.52 HYPERCALCEMIA: ICD-10-CM

## 2022-04-21 LAB
CA-I BLD-SCNC: 1.29 MMOL/L (ref 1.12–1.32)
PHOSPHATE SERPL-MCNC: 2.4 MG/DL (ref 2.3–4.1)
T3FREE SERPL-MCNC: 2.52 PG/ML (ref 2.3–4.2)
T4 FREE SERPL-MCNC: 0.96 NG/DL (ref 0.76–1.46)
TSH SERPL DL<=0.05 MIU/L-ACNC: 0.02 UIU/ML (ref 0.45–4.5)

## 2022-04-21 PROCEDURE — 84481 FREE ASSAY (FT-3): CPT

## 2022-04-21 PROCEDURE — 84100 ASSAY OF PHOSPHORUS: CPT

## 2022-04-21 PROCEDURE — 84443 ASSAY THYROID STIM HORMONE: CPT

## 2022-04-21 PROCEDURE — 84439 ASSAY OF FREE THYROXINE: CPT

## 2022-04-21 PROCEDURE — 84445 ASSAY OF TSI GLOBULIN: CPT

## 2022-04-21 PROCEDURE — 82330 ASSAY OF CALCIUM: CPT

## 2022-04-21 PROCEDURE — 36415 COLL VENOUS BLD VENIPUNCTURE: CPT

## 2022-04-21 NOTE — TELEPHONE ENCOUNTER
----- Message from Chastity Hazel MD sent at 4/21/2022  4:15 PM EDT -----  Please call the patient regarding her results, she should continue methimazole at current dose, free T4 and free T3 is normal, TSH is suppressed could be lagging behind    She will need repeat TSH, free T4 and free T3 in 3 months  She should follow-up as scheduled

## 2022-04-21 NOTE — TELEPHONE ENCOUNTER
Patient's daughter, Zackary Lozano called in response to voicemail for lab results  Please follow up with daughter

## 2022-04-23 LAB — TSI SER-ACNC: <0.1 IU/L (ref 0–0.55)

## 2022-04-25 ENCOUNTER — OFFICE VISIT (OUTPATIENT)
Dept: FAMILY MEDICINE CLINIC | Facility: CLINIC | Age: 87
End: 2022-04-25
Payer: MEDICARE

## 2022-04-25 VITALS
RESPIRATION RATE: 16 BRPM | TEMPERATURE: 97 F | HEART RATE: 60 BPM | SYSTOLIC BLOOD PRESSURE: 130 MMHG | DIASTOLIC BLOOD PRESSURE: 70 MMHG | OXYGEN SATURATION: 97 %

## 2022-04-25 DIAGNOSIS — F41.9 ANXIETY: ICD-10-CM

## 2022-04-25 DIAGNOSIS — I26.99 OTHER PULMONARY EMBOLISM WITHOUT ACUTE COR PULMONALE, UNSPECIFIED CHRONICITY (HCC): ICD-10-CM

## 2022-04-25 DIAGNOSIS — I10 ESSENTIAL HYPERTENSION: ICD-10-CM

## 2022-04-25 DIAGNOSIS — Z48.02 VISIT FOR SUTURE REMOVAL: Primary | ICD-10-CM

## 2022-04-25 PROCEDURE — 99213 OFFICE O/P EST LOW 20 MIN: CPT | Performed by: INTERNAL MEDICINE

## 2022-04-25 RX ORDER — CARVEDILOL 12.5 MG/1
12.5 TABLET ORAL 2 TIMES DAILY WITH MEALS
Qty: 180 TABLET | Refills: 2 | Status: SHIPPED | OUTPATIENT
Start: 2022-04-25 | End: 2022-05-31 | Stop reason: DRUGHIGH

## 2022-04-25 RX ORDER — ALPRAZOLAM 0.25 MG/1
0.25 TABLET ORAL 3 TIMES DAILY PRN
Qty: 60 TABLET | Refills: 3 | Status: SHIPPED | OUTPATIENT
Start: 2022-04-25

## 2022-04-25 RX ORDER — AMLODIPINE BESYLATE 5 MG/1
5 TABLET ORAL 2 TIMES DAILY
Qty: 180 TABLET | Refills: 2 | Status: SHIPPED | OUTPATIENT
Start: 2022-04-25 | End: 2022-07-24

## 2022-04-25 NOTE — ASSESSMENT & PLAN NOTE
Approximately 8-10 sutures were removed from her left hand  Laceration wound is healing well  No discharge  Continue to cover with Band-Aid

## 2022-04-25 NOTE — PROGRESS NOTES
Assessment/Plan:         Problem List Items Addressed This Visit        Cardiovascular and Mediastinum    Essential hypertension    Other pulmonary embolism without acute cor pulmonale, unspecified chronicity (HCC)       Other    Anxiety    Visit for suture removal - Primary     Approximately 8-10 sutures were removed from her left hand  Laceration wound is healing well  No discharge  Continue to cover with Band-Aid  Subjective:      Patient ID: Rodney Ceja is a 80 y o  female  1  Suture removal from left hand laceration  Wound is healing well  No discharge  No fever or chills  No pain  No warm to touch  Suture removed without any evidence of infection  The following portions of the patient's history were reviewed and updated as appropriate:   Past Medical History:  She has a past medical history of Allergic, Anxiety, Arthritis, Cancer ([address] ), Chronic kidney disease, Coronary artery disease, Depression, Disease of thyroid gland, Edema, Generalized headaches, GERD (gastroesophageal reflux disease), Hiatal hernia, History of echocardiogram (02/2016), History of EKG (10/20/2017), History of Holter monitoring (12/22/2015), History of stress test (03/15/2016), [place] (hard of hearing), Hypertension, Macular degeneration, Murmur, heart, Osteoporosis, Subclinical hyperthyroidism, Thyroid nodule, and Vision problems  ,  _______________________________________________________________________  Medical Problems:  does not have any pertinent problems on file ,  _______________________________________________________________________  Past Surgical History:   has a past surgical history that includes Splenectomy, total; Resection tonsil radical; Eye surgery (Left); Appendectomy; Joint replacement (Left, 2010); Skin biopsy; and pr xcapsl ctrc rmvl insj io lens prosth w/o ecp (Right, 8/8/2016)  ,  _______________________________________________________________________  Family History:  family history includes Diabetes in her mother; Heart disease in her father; Hypertension in her daughter; Kidney disease in her brother; Ovarian cancer in her daughter ,  _______________________________________________________________________  Social History:   reports that she has never smoked  She has never used smokeless tobacco  She reports that she does not drink alcohol and does not use drugs  ,  _______________________________________________________________________  Allergies:  is allergic to colchicine, hydralazine, and tramadol     _______________________________________________________________________  Current Outpatient Medications   Medication Sig Dispense Refill    acetaminophen (TYLENOL) 325 mg tablet Take 2 tablets by mouth every 6 (six) hours      ALPRAZolam (XANAX) 0 25 mg tablet take 1 tablet by mouth three times a day if needed for anxiety 60 tablet 3    amLODIPine (NORVASC) 5 mg tablet Take 1 tablet (5 mg total) by mouth 2 (two) times a day 180 tablet 0    aspirin 81 MG tablet Take 81 mg by mouth every morning   B Complex Vitamins (B-COMPLEX/B-12 PO) Take 1,500 mg by mouth 2 (two) times a day        carvedilol (COREG) 12 5 mg tablet Take 1 tablet (12 5 mg total) by mouth 2 (two) times a day with meals 180 tablet 0    Cranberry 04917 MG CAPS Take 1 tablet by mouth 2 (two) times a day        losartan (COZAAR) 50 mg tablet take 2 tablets by mouth once daily 180 tablet 2    methimazole (TAPAZOLE) 5 mg tablet take 1 tablet by mouth once daily MONDAY TO FRIDAY SKIP ON SATURDAYS AND SUNDAYS (Patient taking differently: take 1 tablet by mouth once daily MONDAY Wednesday & FRIDAY SKIP ON SATURDAYS AND SUNDAYS ) 60 tablet 12    triamterene-hydrochlorothiazide (DYAZIDE) 37 5-25 mg per capsule Take 1 capsule by mouth every morning PRN      venlafaxine (EFFEXOR-XR) 150 mg 24 hr capsule Take 150 mg by mouth daily PRN      chlorhexidine (PERIDEX) 0 12 % solution RINSE WITH 15 MLS TWICE A DAY ONCE IN THE MORNING AND AT BEDTIME (Patient not taking: Reported on 1/4/2022)      cyanocobalamin (VITAMIN B-12) 100 MCG tablet Take 100 mcg by mouth daily (Patient not taking: Reported on 4/19/2022 )      desoximetasone (TOPICORT) 0 25 % cream  (Patient not taking: Reported on 3/8/2022 )      diclofenac sodium (VOLTAREN) 1 % Apply 2 g topically 2 (two) times a day (Patient not taking: Reported on 4/8/2021) 1 Tube 5     No current facility-administered medications for this visit      _______________________________________________________________________  Review of Systems      Objective:  Vitals:    04/25/22 1131   BP: 130/70   BP Location: Right arm   Patient Position: Sitting   Cuff Size: Standard   Pulse: 60   Resp: 16   Temp: (!) 97 °F (36 1 °C)   TempSrc: Temporal   SpO2: 97%     There is no height or weight on file to calculate BMI  Physical Exam  Constitutional:       Appearance: Normal appearance  She is not ill-appearing  Pulmonary:      Effort: Pulmonary effort is normal    Neurological:      Mental Status: She is alert and oriented to person, place, and time

## 2022-05-13 ENCOUNTER — HOSPITAL ENCOUNTER (INPATIENT)
Facility: HOSPITAL | Age: 87
LOS: 4 days | Discharge: HOME HEALTH | DRG: 175 | End: 2022-05-17
Attending: EMERGENCY MEDICINE | Admitting: INTERNAL MEDICINE
Payer: MEDICARE

## 2022-05-13 ENCOUNTER — APPOINTMENT (EMERGENCY)
Dept: RADIOLOGY | Facility: HOSPITAL | Age: 87
DRG: 175 | End: 2022-05-13
Payer: MEDICARE

## 2022-05-13 ENCOUNTER — APPOINTMENT (EMERGENCY)
Dept: CT IMAGING | Facility: HOSPITAL | Age: 87
DRG: 175 | End: 2022-05-13
Payer: MEDICARE

## 2022-05-13 DIAGNOSIS — I26.99 PULMONARY EMBOLISM (HCC): ICD-10-CM

## 2022-05-13 DIAGNOSIS — C80.1 CANCER (HCC): Primary | ICD-10-CM

## 2022-05-13 DIAGNOSIS — J96.01 ACUTE RESPIRATORY FAILURE WITH HYPOXIA (HCC): ICD-10-CM

## 2022-05-13 DIAGNOSIS — I26.99 OTHER PULMONARY EMBOLISM WITHOUT ACUTE COR PULMONALE, UNSPECIFIED CHRONICITY (HCC): ICD-10-CM

## 2022-05-13 DIAGNOSIS — R09.02 HYPOXIA: ICD-10-CM

## 2022-05-13 DIAGNOSIS — I26.94 MULTIPLE SUBSEGMENTAL PULMONARY EMBOLI WITHOUT ACUTE COR PULMONALE (HCC): ICD-10-CM

## 2022-05-13 LAB
ALBUMIN SERPL BCP-MCNC: 3.6 G/DL (ref 3.5–5)
ALP SERPL-CCNC: 130 U/L (ref 34–104)
ALT SERPL W P-5'-P-CCNC: 12 U/L (ref 7–52)
ANION GAP SERPL CALCULATED.3IONS-SCNC: 9 MMOL/L (ref 4–13)
AST SERPL W P-5'-P-CCNC: 12 U/L (ref 13–39)
BASE EX.OXY STD BLDV CALC-SCNC: 59.3 % (ref 60–80)
BASE EXCESS BLDV CALC-SCNC: 1.3 MMOL/L
BASOPHILS # BLD AUTO: 0.06 THOUSANDS/ΜL (ref 0–0.1)
BASOPHILS NFR BLD AUTO: 1 % (ref 0–1)
BILIRUB SERPL-MCNC: 0.59 MG/DL (ref 0.2–1)
BNP SERPL-MCNC: 927 PG/ML (ref 0–100)
BUN SERPL-MCNC: 36 MG/DL (ref 5–25)
CALCIUM SERPL-MCNC: 9.6 MG/DL (ref 8.4–10.2)
CARDIAC TROPONIN I PNL SERPL HS: 94 NG/L (ref 8–18)
CHLORIDE SERPL-SCNC: 107 MMOL/L (ref 96–108)
CO2 SERPL-SCNC: 25 MMOL/L (ref 21–32)
CREAT SERPL-MCNC: 0.97 MG/DL (ref 0.6–1.3)
D DIMER PPP FEU-MCNC: 4.77 UG/ML FEU
EOSINOPHIL # BLD AUTO: 0.13 THOUSAND/ΜL (ref 0–0.61)
EOSINOPHIL NFR BLD AUTO: 1 % (ref 0–6)
ERYTHROCYTE [DISTWIDTH] IN BLOOD BY AUTOMATED COUNT: 17.6 % (ref 11.6–15.1)
GFR SERPL CREATININE-BSD FRML MDRD: 51 ML/MIN/1.73SQ M
GLUCOSE SERPL-MCNC: 114 MG/DL (ref 65–140)
HCO3 BLDV-SCNC: 25.2 MMOL/L (ref 24–30)
HCT VFR BLD AUTO: 42.2 % (ref 34.8–46.1)
HGB BLD-MCNC: 12.9 G/DL (ref 11.5–15.4)
IMM GRANULOCYTES # BLD AUTO: 0.08 THOUSAND/UL (ref 0–0.2)
IMM GRANULOCYTES NFR BLD AUTO: 1 % (ref 0–2)
LYMPHOCYTES # BLD AUTO: 2.52 THOUSANDS/ΜL (ref 0.6–4.47)
LYMPHOCYTES NFR BLD AUTO: 20 % (ref 14–44)
MCH RBC QN AUTO: 26.9 PG (ref 26.8–34.3)
MCHC RBC AUTO-ENTMCNC: 30.6 G/DL (ref 31.4–37.4)
MCV RBC AUTO: 88 FL (ref 82–98)
MONOCYTES # BLD AUTO: 0.72 THOUSAND/ΜL (ref 0.17–1.22)
MONOCYTES NFR BLD AUTO: 6 % (ref 4–12)
NEUTROPHILS # BLD AUTO: 9.23 THOUSANDS/ΜL (ref 1.85–7.62)
NEUTS SEG NFR BLD AUTO: 71 % (ref 43–75)
NRBC BLD AUTO-RTO: 0 /100 WBCS
O2 CT BLDV-SCNC: 10.7 ML/DL
PCO2 BLDV: 37.2 MM HG (ref 42–50)
PH BLDV: 7.45 [PH] (ref 7.3–7.4)
PLATELET # BLD AUTO: 442 THOUSANDS/UL (ref 149–390)
PMV BLD AUTO: 10.4 FL (ref 8.9–12.7)
PO2 BLDV: 30.8 MM HG (ref 35–45)
POTASSIUM SERPL-SCNC: 4.3 MMOL/L (ref 3.5–5.3)
PROT SERPL-MCNC: 6.4 G/DL (ref 6.4–8.4)
RBC # BLD AUTO: 4.8 MILLION/UL (ref 3.81–5.12)
SODIUM SERPL-SCNC: 141 MMOL/L (ref 135–147)
WBC # BLD AUTO: 12.74 THOUSAND/UL (ref 4.31–10.16)

## 2022-05-13 PROCEDURE — 71275 CT ANGIOGRAPHY CHEST: CPT

## 2022-05-13 PROCEDURE — 93005 ELECTROCARDIOGRAM TRACING: CPT

## 2022-05-13 PROCEDURE — 83880 ASSAY OF NATRIURETIC PEPTIDE: CPT | Performed by: EMERGENCY MEDICINE

## 2022-05-13 PROCEDURE — 84484 ASSAY OF TROPONIN QUANT: CPT | Performed by: EMERGENCY MEDICINE

## 2022-05-13 PROCEDURE — 96374 THER/PROPH/DIAG INJ IV PUSH: CPT

## 2022-05-13 PROCEDURE — G1004 CDSM NDSC: HCPCS

## 2022-05-13 PROCEDURE — 85379 FIBRIN DEGRADATION QUANT: CPT | Performed by: EMERGENCY MEDICINE

## 2022-05-13 PROCEDURE — 82805 BLOOD GASES W/O2 SATURATION: CPT | Performed by: EMERGENCY MEDICINE

## 2022-05-13 PROCEDURE — 36415 COLL VENOUS BLD VENIPUNCTURE: CPT | Performed by: EMERGENCY MEDICINE

## 2022-05-13 PROCEDURE — 85025 COMPLETE CBC W/AUTO DIFF WBC: CPT | Performed by: EMERGENCY MEDICINE

## 2022-05-13 PROCEDURE — 99285 EMERGENCY DEPT VISIT HI MDM: CPT

## 2022-05-13 PROCEDURE — 71045 X-RAY EXAM CHEST 1 VIEW: CPT

## 2022-05-13 PROCEDURE — 80053 COMPREHEN METABOLIC PANEL: CPT | Performed by: EMERGENCY MEDICINE

## 2022-05-13 RX ORDER — AMLODIPINE BESYLATE 5 MG/1
5 TABLET ORAL ONCE
Status: COMPLETED | OUTPATIENT
Start: 2022-05-13 | End: 2022-05-13

## 2022-05-13 RX ORDER — FUROSEMIDE 10 MG/ML
20 INJECTION INTRAMUSCULAR; INTRAVENOUS ONCE
Status: COMPLETED | OUTPATIENT
Start: 2022-05-13 | End: 2022-05-13

## 2022-05-13 RX ORDER — CARVEDILOL 12.5 MG/1
12.5 TABLET ORAL ONCE
Status: COMPLETED | OUTPATIENT
Start: 2022-05-13 | End: 2022-05-13

## 2022-05-13 RX ORDER — ENOXAPARIN SODIUM 100 MG/ML
1 INJECTION SUBCUTANEOUS EVERY 12 HOURS SCHEDULED
Status: DISCONTINUED | OUTPATIENT
Start: 2022-05-13 | End: 2022-05-15

## 2022-05-13 RX ADMIN — CARVEDILOL 12.5 MG: 12.5 TABLET, FILM COATED ORAL at 18:52

## 2022-05-13 RX ADMIN — FUROSEMIDE 20 MG: 10 INJECTION, SOLUTION INTRAMUSCULAR; INTRAVENOUS at 19:46

## 2022-05-13 RX ADMIN — AMLODIPINE BESYLATE 5 MG: 5 TABLET ORAL at 18:52

## 2022-05-13 RX ADMIN — IOHEXOL 70 ML: 350 INJECTION, SOLUTION INTRAVENOUS at 22:16

## 2022-05-13 NOTE — ED PROCEDURE NOTE
PROCEDURE  ECG 12 Lead Documentation Only    Date/Time: 5/13/2022 7:20 PM  Performed by: Rafael Natarajan MD  Authorized by: Rafael Natarajan MD     Indications / Diagnosis:  Weakness   ECG reviewed by me, the ED Provider: yes    Patient location:  ED and bedside  Previous ECG:     Previous ECG:  Compared to current    Comparison ECG info:  4/7/18    Similarity:  Changes noted  Interpretation:     Interpretation: abnormal    Rate:     ECG rate:  96    ECG rate assessment: normal    Rhythm:     Rhythm: sinus rhythm    Ectopy:     Ectopy: bigeminy    QRS:     QRS axis:  Right (? lead misplacement)    QRS intervals:  Normal  Conduction:     Conduction: abnormal      Abnormal conduction: 1st degree    ST segments:     ST segments:  Normal  T waves:     T waves: flattening      Flattening:  II, III, aVF, V1, V2, V3, V4, V5 and V6  Q waves:     Q waves:  AVL, V2 and V1  Comments:      Low voltage qrs- no ecg signs of ischemia/ injury          Rafael Natarajan MD  05/13/22 1924

## 2022-05-14 PROBLEM — I26.94 MULTIPLE SUBSEGMENTAL PULMONARY EMBOLI WITHOUT ACUTE COR PULMONALE (HCC): Status: ACTIVE | Noted: 2022-05-14

## 2022-05-14 PROBLEM — J96.01 ACUTE RESPIRATORY FAILURE WITH HYPOXIA (HCC): Status: ACTIVE | Noted: 2022-01-01

## 2022-05-14 LAB
ANION GAP SERPL CALCULATED.3IONS-SCNC: 9 MMOL/L (ref 4–13)
BUN SERPL-MCNC: 29 MG/DL (ref 5–25)
CALCIUM SERPL-MCNC: 9.5 MG/DL (ref 8.4–10.2)
CHLORIDE SERPL-SCNC: 105 MMOL/L (ref 96–108)
CO2 SERPL-SCNC: 30 MMOL/L (ref 21–32)
CREAT SERPL-MCNC: 0.84 MG/DL (ref 0.6–1.3)
ERYTHROCYTE [DISTWIDTH] IN BLOOD BY AUTOMATED COUNT: 16.9 % (ref 11.6–15.1)
GFR SERPL CREATININE-BSD FRML MDRD: 60 ML/MIN/1.73SQ M
GLUCOSE SERPL-MCNC: 115 MG/DL (ref 65–140)
HCT VFR BLD AUTO: 40.2 % (ref 34.8–46.1)
HGB BLD-MCNC: 12.4 G/DL (ref 11.5–15.4)
INR PPP: 1.21 (ref 0.84–1.19)
MCH RBC QN AUTO: 26.3 PG (ref 26.8–34.3)
MCHC RBC AUTO-ENTMCNC: 30.8 G/DL (ref 31.4–37.4)
MCV RBC AUTO: 85 FL (ref 82–98)
PLATELET # BLD AUTO: 430 THOUSANDS/UL (ref 149–390)
PMV BLD AUTO: 9.7 FL (ref 8.9–12.7)
POTASSIUM SERPL-SCNC: 3.3 MMOL/L (ref 3.5–5.3)
PROTHROMBIN TIME: 15.2 SECONDS (ref 11.6–14.5)
RBC # BLD AUTO: 4.72 MILLION/UL (ref 3.81–5.12)
SODIUM SERPL-SCNC: 144 MMOL/L (ref 135–147)
WBC # BLD AUTO: 12.16 THOUSAND/UL (ref 4.31–10.16)

## 2022-05-14 PROCEDURE — 85027 COMPLETE CBC AUTOMATED: CPT | Performed by: PHYSICIAN ASSISTANT

## 2022-05-14 PROCEDURE — 99285 EMERGENCY DEPT VISIT HI MDM: CPT | Performed by: EMERGENCY MEDICINE

## 2022-05-14 PROCEDURE — 99223 1ST HOSP IP/OBS HIGH 75: CPT | Performed by: PHYSICIAN ASSISTANT

## 2022-05-14 PROCEDURE — 85610 PROTHROMBIN TIME: CPT | Performed by: PHYSICIAN ASSISTANT

## 2022-05-14 PROCEDURE — 80048 BASIC METABOLIC PNL TOTAL CA: CPT | Performed by: PHYSICIAN ASSISTANT

## 2022-05-14 RX ORDER — ALPRAZOLAM 0.25 MG/1
0.25 TABLET ORAL 3 TIMES DAILY PRN
Status: DISCONTINUED | OUTPATIENT
Start: 2022-05-14 | End: 2022-05-17 | Stop reason: HOSPADM

## 2022-05-14 RX ORDER — ASPIRIN 81 MG/1
81 TABLET ORAL DAILY
Status: DISCONTINUED | OUTPATIENT
Start: 2022-05-14 | End: 2022-05-17 | Stop reason: HOSPADM

## 2022-05-14 RX ORDER — CARVEDILOL 12.5 MG/1
12.5 TABLET ORAL 2 TIMES DAILY WITH MEALS
Status: DISCONTINUED | OUTPATIENT
Start: 2022-05-14 | End: 2022-05-17 | Stop reason: HOSPADM

## 2022-05-14 RX ORDER — ONDANSETRON 2 MG/ML
4 INJECTION INTRAMUSCULAR; INTRAVENOUS EVERY 6 HOURS PRN
Status: DISCONTINUED | OUTPATIENT
Start: 2022-05-14 | End: 2022-05-17 | Stop reason: HOSPADM

## 2022-05-14 RX ORDER — METHIMAZOLE 5 MG/1
5 TABLET ORAL 3 TIMES WEEKLY
Status: DISCONTINUED | OUTPATIENT
Start: 2022-05-16 | End: 2022-05-17 | Stop reason: HOSPADM

## 2022-05-14 RX ORDER — LOSARTAN POTASSIUM 50 MG/1
100 TABLET ORAL DAILY
Status: DISCONTINUED | OUTPATIENT
Start: 2022-05-14 | End: 2022-05-17 | Stop reason: HOSPADM

## 2022-05-14 RX ORDER — AMLODIPINE BESYLATE 5 MG/1
5 TABLET ORAL 2 TIMES DAILY
Status: DISCONTINUED | OUTPATIENT
Start: 2022-05-14 | End: 2022-05-17 | Stop reason: HOSPADM

## 2022-05-14 RX ORDER — LANOLIN ALCOHOL/MO/W.PET/CERES
6 CREAM (GRAM) TOPICAL
Status: DISCONTINUED | OUTPATIENT
Start: 2022-05-14 | End: 2022-05-17 | Stop reason: HOSPADM

## 2022-05-14 RX ORDER — VENLAFAXINE HYDROCHLORIDE 150 MG/1
150 CAPSULE, EXTENDED RELEASE ORAL DAILY
Status: DISCONTINUED | OUTPATIENT
Start: 2022-05-14 | End: 2022-05-17 | Stop reason: HOSPADM

## 2022-05-14 RX ORDER — ACETAMINOPHEN 325 MG/1
650 TABLET ORAL EVERY 6 HOURS
Status: DISCONTINUED | OUTPATIENT
Start: 2022-05-14 | End: 2022-05-17 | Stop reason: HOSPADM

## 2022-05-14 RX ADMIN — ENOXAPARIN SODIUM 60 MG: 60 INJECTION SUBCUTANEOUS at 21:25

## 2022-05-14 RX ADMIN — ENOXAPARIN SODIUM 60 MG: 60 INJECTION SUBCUTANEOUS at 08:27

## 2022-05-14 RX ADMIN — LOSARTAN POTASSIUM 100 MG: 50 TABLET, FILM COATED ORAL at 10:27

## 2022-05-14 RX ADMIN — ACETAMINOPHEN 650 MG: 325 TABLET, FILM COATED ORAL at 16:59

## 2022-05-14 RX ADMIN — ALPRAZOLAM 0.25 MG: 0.25 TABLET ORAL at 11:32

## 2022-05-14 RX ADMIN — Medication 6 MG: at 21:25

## 2022-05-14 RX ADMIN — ENOXAPARIN SODIUM 60 MG: 60 INJECTION SUBCUTANEOUS at 01:17

## 2022-05-14 RX ADMIN — ASPIRIN 81 MG: 81 TABLET, COATED ORAL at 10:27

## 2022-05-14 RX ADMIN — AMLODIPINE BESYLATE 5 MG: 5 TABLET ORAL at 10:27

## 2022-05-14 RX ADMIN — VENLAFAXINE HYDROCHLORIDE 150 MG: 150 CAPSULE, EXTENDED RELEASE ORAL at 10:27

## 2022-05-14 RX ADMIN — ACETAMINOPHEN 650 MG: 325 TABLET, FILM COATED ORAL at 10:27

## 2022-05-14 NOTE — H&P
[facility]  H&P- Ayesha Whiteside 8/5/1930, 80 y o  female MRN: [id number]  Unit/Bed#: ED 26 Encounter: [id number]  Primary Care Provider: Sarah Hamlin MD   Date and time admitted to hospital: 5/13/2022  5:36 PM    * Multiple subsegmental pulmonary emboli without acute cor pulmonale ([address] )  Assessment & Plan  · Patient presented to the ED today after calling her daughter and telling her she wasn't feeling well  · Upon arrival, significant hypoxia-- stable in the high 80s to low 90s on 4-5L NC at this time  · CTA PE study: Acute pulmonary emboli in right upper, middle and lower lobe segmental branches  No evidence of acutely elevated right-sided pressure    · In setting of likely metastatic malignancy   · Lovenox 1mg/kg SQ BID  · Echo IP vs  OP  · D/W CM re: NOAC  · Wean O2 as tolerated-- may require home O2 evaluation    Acute respiratory failure with hypoxia (HCC)  Assessment & Plan  · Patient w/ low O2 saturations on arrival in the ED-- 82% on RA  · In setting of acute PE  · Requiring 4-5L O2 at this time to maintain saturations 88-90%  · tx underlying cause  · Wean O2 as tolerated  · If O2 requirements escalate, will discuss further w/ patient's daughter Amanda Reynolds    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  Assessment & Plan  · Known to patient and family; had PET scan that showed additional metabolic uptake in breast as well  · Per review of OP records-- family has chosen to not disclose this to the patient, and no further investigation/management is wanted    CKD (chronic kidney disease) stage 3, GFR 30-59 ml/min (HCC)  Assessment & Plan  Lab Results   Component Value Date    EGFR 51 05/13/2022    EGFR 50 03/18/2022    EGFR 48 11/18/2021    CREATININE 0 97 05/13/2022    CREATININE 0 98 03/18/2022    CREATININE 1 03 11/18/2021   · Cr overall stable at baseline    Essential hypertension  Assessment & Plan  · Continue Norvasc, Coreg, Cozaar      VTE Pharmacologic Prophylaxis: VTE Score: 11 High Risk (Score >/= 5) - Pharmacological DVT Prophylaxis Ordered: enoxaparin (Lovenox)  Sequential Compression Devices Ordered  Code Status: Level 1 - Full Code   Discussion with family: daughter James Peña updated and aware of treatment plan  Anticipated Length of Stay: Patient will be admitted on an inpatient basis with an anticipated length of stay of greater than 2 midnights secondary to tx PE  Total Time for Visit, including Counseling / Coordination of Care: 30 minutes Greater than 50% of this total time spent on direct patient counseling and coordination of care  Chief Complaint: not feeling well    History of Present Illness:  Ralf Bradley is a 80 y o  female with a PMH of HTN, CKD 3 who presents with feeling unwell x1 day  Patient called her daughter earlier and said she did not feel well  Denied any specific complaints  Her family called EMS who transported her to the hospital  Found to be hypoxic, multiple R sided PE w/o cor pulmonale found  Patient herself denies SOB at rest, but does admit to feeling slightly SOB when she is being transported/moved/changed/etc  She denies any chest pain, abdominal pain, back pain, inspiratory pain  Review of Systems:  Review of Systems   Constitutional: Positive for fatigue  HENT: Negative  Eyes: Negative  Respiratory: Positive for shortness of breath  Cardiovascular: Negative  Gastrointestinal: Negative  Genitourinary: Negative  Musculoskeletal: Negative  Skin: Negative  Neurological: Positive for weakness  Hematological: Negative  Psychiatric/Behavioral: Negative          Past Medical and Surgical History:   Past Medical History:   Diagnosis Date    Allergic     Anxiety     Arthritis     left knee    Cancer (HCC)     skin: face, nose and back (100+ sutures)    Chronic kidney disease     stage 2    Coronary artery disease     Depression     Disease of thyroid gland     Edema     Per [place]     Generalized headaches Per Naldo Mustafa GERD (gastroesophageal reflux disease)     Per [place]     Hiatal hernia     History of echocardiogram 02/2016    Stress Echo 3/15/16- Normal stress echo with no echocardiographic evidence of myocardial infraction or myocardial ischemia  This was technically difficult study  Echo 2/9/2016- Mild LVH  EF of 50-55%  LV diastolic noncompliance  Mitral annular calcification with 2+ MR  Normal opening of the cardiac valves    History of EKG 10/20/2017     Normal sinus rhythm, possible left atrial enlargement  Borderline ECG  EKG 7/28/17- Sinus rhytm with 1st degree AV block  Cannot rule out inferior infarct, age undetermined  Abnormal ECG  EKG 1/13/17- Normal sinus rhythm, nonspecific T wave abnormality  Abnormal ECG  EKG 2/26/16- Sinus rhythm with 1st degree AV block  Possible left atrial enlargement  Cannot rule out inferior infarct, age Jenae Hoe History of Holter monitoring 12/22/2015    The baseline rhythm was of sinus origin with an average rate of 72bpm, (range: 59-98bpm)  There were frequant single uniform PVCs (averaging over 1600 beats per hour)  There were a few episodes of asymptomatic sinus arrhythmia with sinus bradycardia  There were a few PACs (averaging 8 4 beats per hour)  Aside from sinus arrhythmia, there were no other sustained cardiac dysrhythmias   History of stress test 03/15/2016     Negative EKG part of stress echo at 100% age predicted maximum heart rate  Frequant PVC's in the pattern of ventricular bigeminy at rest and in recovery with significant suppression during exercise  No evidence of complex dysrhythmias  Resting hypertension with normal blood pressure response to exercise  Exaggreated heart rate response to exercise   Cardiolite 1/8/16- Moderately abnormal strudy wi    [place] (hard of hearing)     bilat HA    Hypertension     Macular degeneration     Murmur, heart     Osteoporosis     Per [place]     Subclinical hyperthyroidism     Thyroid nodule     right thyroid nodule (neg biopsy, pe post) -Per Blase Held     Vision problems     mac degenaration- Per Blase Held        Past Surgical History:   Procedure Laterality Date    APPENDECTOMY      age 15   [place] [facility] EYE SURGERY Left     Cataract with IOL    JOINT REPLACEMENT Left 2010    knee    OK XCAPSL CTRC RMVL INSJ IO LENS PROSTH W/O ECP Right 8/8/2016    Procedure: EXTRACTION EXTRACAPSULAR CATARACT PHACO INTRAOCULAR LENS (IOL); Surgeon: Amy Cassidy MD;  Location: [facility] MAIN OR;  Service: Ophthalmology    RESECTION TONSIL RADICAL      SKIN BIOPSY      face, back, right leg    SPLENECTOMY, TOTAL         Meds/Allergies:  Prior to Admission medications    Medication Sig Start Date End Date Taking? Authorizing Provider   acetaminophen (TYLENOL) 325 mg tablet Take 2 tablets by mouth every 6 (six) hours    Historical Provider, MD   ALPRAZolam (XANAX) 0 25 mg tablet Take 1 tablet (0 25 mg total) by mouth 3 (three) times a day as needed for anxiety 4/25/22   Cindy Brewer MD   amLODIPine (NORVASC) 5 mg tablet Take 1 tablet (5 mg total) by mouth 2 (two) times a day 4/25/22 7/24/22  Cindy Brewer MD   aspirin 81 MG tablet Take 81 mg by mouth every morning  Historical Provider, MD   B Complex Vitamins (B-COMPLEX/B-12 PO) Take 1,500 mg by mouth 2 (two) times a day      Historical Provider, MD   carvedilol (COREG) 12 5 mg tablet Take 1 tablet (12 5 mg total) by mouth 2 (two) times a day with meals 4/25/22 7/24/22  Cindy Brewer MD   chlorhexidine (PERIDEX) 0 12 % solution RINSE WITH 15 MLS TWICE A DAY ONCE IN THE MORNING AND AT BEDTIME  Patient not taking: Reported on 1/4/2022 12/13/21   Historical Provider, MD   Cranberry 26952 MG CAPS Take 1 tablet by mouth 2 (two) times a day      Historical Provider, MD   cyanocobalamin (VITAMIN B-12) 100 MCG tablet Take 100 mcg by mouth daily  Patient not taking: Reported on 4/19/2022     Historical Provider, MD   desoximetasone (TOPICORT) 0 25 % cream  7/2/20 Historical Provider, MD   diclofenac sodium (VOLTAREN) 1 % Apply 2 g topically 2 (two) times a day  Patient not taking: Reported on 4/8/2021 10/6/20   Paulette Gutierrez MD   losartan (COZAAR) 50 mg tablet take 2 tablets by mouth once daily 11/8/21   Paulette Gutierrez MD   methimazole (TAPAZOLE) 5 mg tablet take 1 tablet by mouth once daily [address]  Patient taking differently: take 1 tablet by mouth once daily MONDAY Wednesday & FRIDAY SKIP ON [facility] AND SUNDAYS  2/8/22   Paulette Gutierrez MD   triamterene-hydrochlorothiazide (DYAZIDE) 37 5-25 mg per capsule Take 1 capsule by mouth every morning PRN    Historical Provider, MD   venlafaxine (EFFEXOR-XR) 150 mg 24 hr capsule Take 150 mg by mouth daily PRN    Historical Provider, MD     I have reviewed home medications using recent Epic encounter  Allergies: Allergies   Allergen Reactions    Colchicine     Hydralazine Swelling     Cheeks swelled after IV Hydralazine    Tramadol        Social History:  Marital Status:     Occupation:   Patient Pre-hospital Living Situation: Home, Alone  Patient Pre-hospital Level of Mobility: unable to be assessed at time of evaluation  Patient Pre-hospital Diet Restrictions: none  Substance Use History:   Social History     Substance and Sexual Activity   Alcohol Use Never     Social History     Tobacco Use   Smoking Status Never Smoker   Smokeless Tobacco Never Used     Social History     Substance and Sexual Activity   Drug Use No       Family History:  Family History   Problem Relation Age of Onset    Diabetes Mother     Heart disease Father         massive MI exp 72    Kidney disease Brother     Ovarian cancer Daughter         Ovarian cancer, disseminated - Per [place]     Hypertension Daughter         Per [place]        Physical Exam:     Vitals:   Blood Pressure: 149/78 (05/14/22 0004)  Pulse: 84 (05/14/22 0004)  Temperature: 97 6 °F (36 4 °C) (05/13/22 1739)  Temp Source: Oral (05/13/22 1739)  Respirations: 18 (05/14/22 0004)  Height: 5' 1" (154 9 cm) (05/13/22 1739)  Weight - Scale: 62 5 kg (137 lb 12 6 oz) (05/13/22 1739)  SpO2: 90 % (05/14/22 0004)    Physical Exam  Constitutional:       General: She is not in acute distress  Appearance: She is not ill-appearing  HENT:      Head: Normocephalic  Eyes:      Pupils: Pupils are equal, round, and reactive to light  Cardiovascular:      Rate and Rhythm: Normal rate and regular rhythm  Heart sounds: No murmur heard  No friction rub  No gallop  Pulmonary:      Effort: Pulmonary effort is normal       Breath sounds: Normal breath sounds  No wheezing or rales  Abdominal:      General: Abdomen is flat  Bowel sounds are normal       Palpations: Abdomen is soft  Tenderness: There is no abdominal tenderness  Musculoskeletal:      Right lower leg: No edema  Left lower leg: No edema  Skin:     General: Skin is warm and dry  Neurological:      General: No focal deficit present  Mental Status: She is alert and oriented to person, place, and time  Mental status is at baseline     Psychiatric:         Mood and Affect: Mood normal          Additional Data:     Lab Results:  Results from last 7 days   Lab Units 05/13/22  1818   WBC Thousand/uL 12 74*   HEMOGLOBIN g/dL 12 9   HEMATOCRIT % 42 2   PLATELETS Thousands/uL 442*   NEUTROS PCT % 71   LYMPHS PCT % 20   MONOS PCT % 6   EOS PCT % 1     Results from last 7 days   Lab Units 05/13/22  1818   SODIUM mmol/L 141   POTASSIUM mmol/L 4 3   CHLORIDE mmol/L 107   CO2 mmol/L 25   BUN mg/dL 36*   CREATININE mg/dL 0 97   ANION GAP mmol/L 9   CALCIUM mg/dL 9 6   ALBUMIN g/dL 3 6   TOTAL BILIRUBIN mg/dL 0 59   ALK PHOS U/L 130*   ALT U/L 12   AST U/L 12*   GLUCOSE RANDOM mg/dL 114                       Imaging: Reviewed radiology reports from this admission including: chest CT scan  CTA ED chest PE study   Final Result by Hermila Boyd MD (05/13 3882)         1  Acute pulmonary emboli in right upper, middle and lower lobe segmental branches  No evidence of acutely elevated right-sided pressure  2   Mildly increased size of left upper lobe mass  Stable lymphadenopathy  3   Small to moderate left and small right pleural effusions  Bibasilar atelectasis  I personally discussed this study with Reji Montenegro on 5/13/2022 at 11:03 PM          [                  Walt Zambrano performed: [id number]         XR chest 1 view portable   ED Interpretation by Austin Lr MD (05/13 1285)   Left mid lung opacity          EKG and Other Studies Reviewed on Admission:   · EKG: sinus 96 bpm w/ bigeminy and diffuse T wave flattening  ** Please Note: This note has been constructed using a voice recognition system   **

## 2022-05-14 NOTE — ED PROVIDER NOTES
History  Chief Complaint   Patient presents with    Fatigue     Pt arrives from home via EMS  States she has had nausea, weakness, dizziness, SOB since this AM  Denies CP, HA, vomiting or diarrhea  80 yr female  Lives alone but family is nearby and vitis daily -- pt called daughter tonight and c/o not feeling  Well/ nausea- dizzy- but no vertigo- near syncope- pt currently denies any other complaints- no cp/sob-- no fevers/ no v/d- no melena/brbpr - no gu comps--  Note- as per daughter pt has metastatic cancer- ? primary lung that pt does not know about--       History provided by:  Patient and relative   used: No        Prior to Admission Medications   Prescriptions Last Dose Informant Patient Reported? Taking? ALPRAZolam (XANAX) 0 25 mg tablet   No No   Sig: Take 1 tablet (0 25 mg total) by mouth 3 (three) times a day as needed for anxiety   B Complex Vitamins (B-COMPLEX/B-12 PO)   Yes No   Sig: Take 1,500 mg by mouth 2 (two) times a day     Cranberry 10933 MG CAPS   Yes No   Sig: Take 1 tablet by mouth 2 (two) times a day  acetaminophen (TYLENOL) 325 mg tablet   Yes No   Sig: Take 2 tablets by mouth every 6 (six) hours   amLODIPine (NORVASC) 5 mg tablet   No No   Sig: Take 1 tablet (5 mg total) by mouth 2 (two) times a day   aspirin 81 MG tablet   Yes No   Sig: Take 81 mg by mouth every morning     carvedilol (COREG) 12 5 mg tablet   No No   Sig: Take 1 tablet (12 5 mg total) by mouth 2 (two) times a day with meals   chlorhexidine (PERIDEX) 0 12 % solution   Yes No   Sig: RINSE WITH 15 MLS TWICE A DAY ONCE IN THE MORNING AND AT BEDTIME   Patient not taking: Reported on 1/4/2022   cyanocobalamin (VITAMIN B-12) 100 MCG tablet   Yes No   Sig: Take 100 mcg by mouth daily   Patient not taking: Reported on 4/19/2022    desoximetasone (TOPICORT) 0 25 % cream   Yes No   Patient not taking: Reported on 3/8/2022    diclofenac sodium (VOLTAREN) 1 %   No No   Sig: Apply 2 g topically 2 (two) times a day   Patient not taking: Reported on 4/8/2021   losartan (COZAAR) 50 mg tablet   No No   Sig: take 2 tablets by mouth once daily   methimazole (TAPAZOLE) 5 mg tablet   No No   Sig: take 1 tablet by mouth once daily MONDAY TO [facility]   Patient taking differently: take 1 tablet by mouth once daily MONDAY Wednesday & FRIDAY SKIP ON SATURDAYS AND SUNDAYS    triamterene-hydrochlorothiazide (DYAZIDE) 37 5-25 mg per capsule   Yes No   Sig: Take 1 capsule by mouth every morning PRN   venlafaxine (EFFEXOR-XR) 150 mg 24 hr capsule   Yes No   Sig: Take 150 mg by mouth daily PRN      Facility-Administered Medications: None       Past Medical History:   Diagnosis Date    Allergic     Anxiety     Arthritis     left knee    Cancer ([facility] [address] )     skin: face, nose and back (100+ sutures)    Chronic kidney disease     stage 2    Coronary artery disease     Depression     Disease of thyroid gland     Edema     Per Alethea Caswell     Generalized headaches     Per Alethea Caswell     GERD (gastroesophageal reflux disease)     Per Alethea Caswell     Hiatal hernia     History of echocardiogram 02/2016    Stress Echo 3/15/16- Normal stress echo with no echocardiographic evidence of myocardial infraction or myocardial ischemia  This was technically difficult study  Echo 2/9/2016- Mild LVH  EF of 50-55%  LV diastolic noncompliance  Mitral annular calcification with 2+ MR  Normal opening of the cardiac valves    History of EKG 10/20/2017     Normal sinus rhythm, possible left atrial enlargement  Borderline ECG  EKG 7/28/17- Sinus rhytm with 1st degree AV block  Cannot rule out inferior infarct, age undetermined  Abnormal ECG  EKG 1/13/17- Normal sinus rhythm, nonspecific T wave abnormality  Abnormal ECG  EKG 2/26/16- Sinus rhythm with 1st degree AV block  Possible left atrial enlargement   Cannot rule out inferior infarct, age Marsha Oneill History of Holter monitoring 12/22/2015    The baseline rhythm was of sinus origin with an average rate of 72bpm, (range: 59-98bpm)  There were frequant single uniform PVCs (averaging over 1600 beats per hour)  There were a few episodes of asymptomatic sinus arrhythmia with sinus bradycardia  There were a few PACs (averaging 8 4 beats per hour)  Aside from sinus arrhythmia, there were no other sustained cardiac dysrhythmias   History of stress test 03/15/2016     Negative EKG part of stress echo at 100% age predicted maximum heart rate  Frequant PVC's in the pattern of ventricular bigeminy at rest and in recovery with significant suppression during exercise  No evidence of complex dysrhythmias  Resting hypertension with normal blood pressure response to exercise  Exaggreated heart rate response to exercise  Cardiolite 1/8/16- Moderately abnormal strudy wi    [place] (hard of hearing)     bilat HA    Hypertension     Macular degeneration     Murmur, heart     Osteoporosis     Per Mariana     Subclinical hyperthyroidism     Thyroid nodule     right thyroid nodule (neg biopsy, pe post) -Per Musa Schumacher     Vision problems     mac degenaration- Per Musa Schumacher        Past Surgical History:   Procedure Laterality Date    APPENDECTOMY      age 15   [facility] EYE SURGERY Left     Cataract with IOL    JOINT REPLACEMENT Left 2010    knee    OH XCAPSL CTRC RMVL INSJ IO LENS PROSTH W/O ECP Right 8/8/2016    Procedure: EXTRACTION EXTRACAPSULAR CATARACT PHACO INTRAOCULAR LENS (IOL); Surgeon: Kirby Carvajal MD;  Location: [facility] MAIN OR;  Service: Ophthalmology    RESECTION TONSIL RADICAL      SKIN BIOPSY      face, back, right leg    SPLENECTOMY, TOTAL         Family History   Problem Relation Age of Onset    Diabetes Mother     Heart disease Father         massive MI exp 72    Kidney disease Brother     Ovarian cancer Daughter         Ovarian cancer, disseminated - Per Musa Schumacher     Hypertension Daughter         Per Musa Schumacher      I have reviewed and agree with the history as documented      E-Cigarette/Vaping    E-Cigarette Use Never User      E-Cigarette/Vaping Substances    Nicotine No     THC No     CBD No     Flavoring No     Other No     Unknown No      Social History     Tobacco Use    Smoking status: Never Smoker    Smokeless tobacco: Never Used   Vaping Use    Vaping Use: Never used   Substance Use Topics    Alcohol use: Never    Drug use: No       Review of Systems   Constitutional: Positive for activity change and fatigue  Negative for appetite change, chills, diaphoresis, fever and unexpected weight change  HENT: Negative  Eyes: Negative  Respiratory: Negative for apnea, cough, choking, chest tightness, shortness of breath, wheezing and stridor  Cardiovascular: Negative  Gastrointestinal: Positive for nausea  Negative for abdominal distention, abdominal pain, anal bleeding, blood in stool, constipation, diarrhea, rectal pain and vomiting  Endocrine: Negative  Genitourinary: Negative  Musculoskeletal: Negative  Skin: Negative  Allergic/Immunologic: Negative  Neurological: Negative  Hematological: Negative  Psychiatric/Behavioral: Negative  Physical Exam  Physical Exam  Vitals and nursing note reviewed  Constitutional:       General: She is not in acute distress  Appearance: Normal appearance  She is not ill-appearing, toxic-appearing or diaphoretic  Comments: avss- tachy- pulse ox 82 % on ra- interpretation is low- pt placed on o2 5 liters nc with pulse ox in mid 90's   HENT:      Head: Normocephalic and atraumatic  Nose: Nose normal       Mouth/Throat:      Mouth: Mucous membranes are moist    Eyes:      General: No scleral icterus  Right eye: No discharge  Left eye: No discharge  Extraocular Movements: Extraocular movements intact  Conjunctiva/sclera: Conjunctivae normal       Pupils: Pupils are equal, round, and reactive to light  Comments: Mm pink   Neck:      Vascular: No carotid bruit        Comments: No pmt c/t/l/S spine  Cardiovascular:      Rate and Rhythm: Normal rate  Pulses: Normal pulses  Heart sounds: Normal heart sounds  No murmur heard  No friction rub  No gallop  Comments: extrasystoles  Pulmonary:      Effort: No respiratory distress  Breath sounds: No stridor  Rhonchi present  No wheezing or rales  Comments: Mild tachypnea- left sided mid lung rhonci   Chest:      Chest wall: No tenderness  Abdominal:      General: Bowel sounds are normal  There is no distension  Palpations: Abdomen is soft  There is no mass  Tenderness: There is no abdominal tenderness  There is no right CVA tenderness, left CVA tenderness, guarding or rebound  Hernia: No hernia is present  Comments: Soft nt/nd- no hsm- no ascites- no peritoneal signs-- no pulsatile abd mass/bruit/ tenderness   Musculoskeletal:         General: No swelling, tenderness, deformity or signs of injury  Normal range of motion  Cervical back: Normal range of motion and neck supple  No rigidity or tenderness  Right lower leg: Edema present  Left lower leg: Edema present  Comments: 2 plus ble pretibial edema- rle greater than lle-  chtonic - equal bilateral radial/dp pulses   Lymphadenopathy:      Cervical: No cervical adenopathy  Skin:     Capillary Refill: Capillary refill takes less than 2 seconds  Coloration: Skin is pale  Skin is not jaundiced  Findings: No bruising, erythema, lesion or rash  Neurological:      General: No focal deficit present  Mental Status: She is alert and oriented to person, place, and time  Mental status is at baseline  Cranial Nerves: No cranial nerve deficit  Sensory: No sensory deficit  Motor: No weakness  Coordination: Coordination normal       Gait: Gait normal       Comments: Normal non focal neuro exam    Psychiatric:         Mood and Affect: Mood normal          Behavior: Behavior normal          Thought Content:  Thought content normal          Judgment: Judgment normal          Vital Signs  ED Triage Vitals [05/13/22 1739]   Temperature Pulse Respirations Blood Pressure SpO2   97 6 °F (36 4 °C) 91 (!) 24 (!) 175/81 (!) 82 %      Temp Source Heart Rate Source Patient Position - Orthostatic VS BP Location FiO2 (%)   Oral Monitor Sitting Right arm --      Pain Score       No Pain           Vitals:    05/13/22 2200 05/13/22 2300 05/14/22 0004 05/14/22 0136   BP: 141/69 155/78 149/78 142/70   Pulse: 80 88 84 (!) 45   Patient Position - Orthostatic VS: Sitting  Lying Lying         Visual Acuity      ED Medications  Medications   enoxaparin (LOVENOX) subcutaneous injection 60 mg (60 mg Subcutaneous Given 5/14/22 0117)   melatonin tablet 6 mg (6 mg Oral Not Given 5/14/22 0115)   carvedilol (COREG) tablet 12 5 mg (12 5 mg Oral Given 5/13/22 1852)   amLODIPine (NORVASC) tablet 5 mg (5 mg Oral Given 5/13/22 1852)   furosemide (LASIX) injection 20 mg (20 mg Intravenous Given 5/13/22 1946)   iohexol (OMNIPAQUE) 350 MG/ML injection (MULTI-DOSE) 70 mL (70 mL Intravenous Given 5/13/22 2216)       Diagnostic Studies  Results Reviewed     Procedure Component Value Units Date/Time    D-dimer, quantitative [[id number]]  (Abnormal) Collected: 05/13/22 1932    Lab Status: Final result Specimen: Blood from Arm, Right Updated: 05/13/22 2053     D-Dimer, Quant 4 77 ug/ml FEU     Narrative: In the evaluation for possible pulmonary embolism, in the appropriate (Well's Score of 4 or less) patient, the age adjusted d-dimer cutoff for this patient can be calculated as:    Age x 0 01 (in ug/mL) for Age-adjusted D-dimer exclusion threshold for a patient over 50 years      Blood gas, venous [[id number]]  (Abnormal) Collected: 05/13/22 1907    Lab Status: Final result Specimen: Blood from Arm, Left Updated: 05/13/22 1919     pH, David 7 448     pCO2, David 37 2 mm Hg      pO2, David 30 8 mm Hg      HCO3, David 25 2 mmol/L      Base Excess, David 1 3 mmol/L      O2 Content, David 10 7 ml/dL      O2 HGB, VENOUS 59 3 %     B-Type Natriuretic Peptide(BNP), AN, CA, EA Campuses Only [[id number]]  (Abnormal) Collected: 05/13/22 1818    Lab Status: Final result Specimen: Blood from Arm, Left Updated: 05/13/22 1855      pg/mL     High Sensitivity Troponin I Random [[id number]]  (Abnormal) Collected: 05/13/22 1818    Lab Status: Final result Specimen: Blood from Arm, Left Updated: 05/13/22 1855     HS TnI random 94 ng/L     Comprehensive metabolic panel [[id number]]  (Abnormal) Collected: 05/13/22 1818    Lab Status: Final result Specimen: Blood from Line, Venous Updated: 05/13/22 1848     Sodium 141 mmol/L      Potassium 4 3 mmol/L      Chloride 107 mmol/L      CO2 25 mmol/L      ANION GAP 9 mmol/L      BUN 36 mg/dL      Creatinine 0 97 mg/dL      Glucose 114 mg/dL      Calcium 9 6 mg/dL      AST 12 U/L      ALT 12 U/L      Alkaline Phosphatase 130 U/L      Total Protein 6 4 g/dL      Albumin 3 6 g/dL      Total Bilirubin 0 59 mg/dL      eGFR 51 ml/min/1 73sq m     Narrative:      [place] guidelines for Chronic Kidney Disease (CKD):     Stage 1 with normal or high GFR (GFR > 90 mL/min/1 73 square meters)    Stage 2 Mild CKD (GFR = 60-89 mL/min/1 73 square meters)    Stage 3A Moderate CKD (GFR = 45-59 mL/min/1 73 square meters)    Stage 3B Moderate CKD (GFR = 30-44 mL/min/1 73 square meters)    Stage 4 Severe CKD (GFR = 15-29 mL/min/1 73 square meters)    Stage 5 End Stage CKD (GFR <15 mL/min/1 73 square meters)  Note: GFR calculation is accurate only with a steady state creatinine    CBC and differential [[id number]]  (Abnormal) Collected: 05/13/22 1818    Lab Status: Final result Specimen: Blood from Arm, Left Updated: 05/13/22 1827     WBC 12 74 Thousand/uL      RBC 4 80 Million/uL      Hemoglobin 12 9 g/dL      Hematocrit 42 2 %      MCV 88 fL      MCH 26 9 pg      MCHC 30 6 g/dL      RDW 17 6 %      MPV 10 4 fL      Platelets 773 Thousands/uL      nRBC 0 /100 WBCs      Neutrophils Relative 71 %      Immat GRANS % 1 %      Lymphocytes Relative 20 %      Monocytes Relative 6 %      Eosinophils Relative 1 %      Basophils Relative 1 %      Neutrophils Absolute 9 23 Thousands/µL      Immature Grans Absolute 0 08 Thousand/uL      Lymphocytes Absolute 2 52 Thousands/µL      Monocytes Absolute 0 72 Thousand/µL      Eosinophils Absolute 0 13 Thousand/µL      Basophils Absolute 0 06 Thousands/µL                  CTA ED chest PE study   Final Result by Yohan Red MD (05/13 2316)         1  Acute pulmonary emboli in right upper, middle and lower lobe segmental branches  No evidence of acutely elevated right-sided pressure  2   Mildly increased size of left upper lobe mass  Stable lymphadenopathy  3   Small to moderate left and small right pleural effusions  Bibasilar atelectasis               I personally discussed this study with Zina Grady on 5/13/2022 at 11:03 PM          [                  Chelsey Vanegas performed: [id number]         XR chest 1 view portable   ED Interpretation by Camden Chand MD (12/97 1835)   Left mid lung opacity                 Procedures  Procedures         ED Course  ED Course as of 05/14/22 0140   Fri May 13, 2022   1811 - er md not-e recent labs reviewed by er md-    1835 Cxr portable-  compared to previous 4/18-- no change in mediastinum-- left mid lung opacity is new    1924 Er md note- prehospital 12 lead ecg reviewed and compared by er md- no sign changes   1933 Er md medical decision making note- pt is low risk for pe by well score- score of 1-- pt fails perc by age/ and low room air sat- will check d dimer and use peg ed study d dimer cutoff of 1000 for low risk pt's   2317 Cta  tiger text from radiologist- dr Isamar Stuart- rul/rml/ rll pe- no evidence of acute r sided heart strain    2333 - er md note-  daughter Eric Alonzo CONTACTED OVER PHONE BY ER MD -- MADE AWARE OF FINDING OF R SIDED PE'S--  SHE DOES NOT  WANT ME TO DISCUSS CANCER THAT PT HAS-- BUT IS FINE WITH TREATMENT OF PE-- SHE STATES THAT PT WOULD BE A LEVEL 3 DNR- BUT DOES NOT WANT TO COMMIT TO THIS WITHOUT TALKING TO HER BROTHER- BUT IF HER CONDITION WOULD DETERIORATE  SHE WOULD WANT TO BE CONTACTED                                SBIRT 20yo+    Flowsheet Row Most Recent Value   SBIRT (25 yo +)    In order to provide better care to our patients, we are screening all of our patients for alcohol and drug use  Would it be okay to ask you these screening questions? Yes Filed at: 05/13/2022 1746   Initial Alcohol Screen: US AUDIT-C     1  How often do you have a drink containing alcohol? 0 Filed at: 05/13/2022 1746   2  How many drinks containing alcohol do you have on a typical day you are drinking? 0 Filed at: 05/13/2022 1746   3a  Male UNDER 65: How often do you have five or more drinks on one occasion? 0 Filed at: 05/13/2022 1746   3b  FEMALE Any Age, or MALE 65+: How often do you have 4 or more drinks on one occassion? 0 Filed at: 05/13/2022 1746   Audit-C Score 0 Filed at: 05/13/2022 1746   JACKIE: How many times in the past year have you    Used an illegal drug or used a prescription medication for non-medical reasons?  Never Filed at: 05/13/2022 1746                    MDM    Disposition  Final diagnoses:   Cancer ([facility] [address] )   Pulmonary embolism ([facility] [address] )   Hypoxia     Time reflects when diagnosis was documented in both MDM as applicable and the Disposition within this note     Time User Action Codes Description Comment    5/13/2022 11:31 PM Faby Marte Add [C80 1] Cancer ([address] )     5/13/2022 11:32 PM Faby Marte Add [I26 99] Pulmonary embolism ([facility] [address] )     5/13/2022 11:32 PM Faby Guerrarad Add [R09 02] Hypoxia       ED Disposition     ED Disposition   Admit    Condition   Stable    Date/Time   Fri May 13, 2022 4501    Comment   Case was discussed with dr Sami Mcdonald and the patient's admission status was agreed to be Admission Status: inpatient status to the service of Dr zamora  Follow-up Information    None         Current Discharge Medication List      CONTINUE these medications which have NOT CHANGED    Details   acetaminophen (TYLENOL) 325 mg tablet Take 2 tablets by mouth every 6 (six) hours      ALPRAZolam (XANAX) 0 25 mg tablet Take 1 tablet (0 25 mg total) by mouth 3 (three) times a day as needed for anxiety  Qty: 60 tablet, Refills: 3    Associated Diagnoses: Anxiety      amLODIPine (NORVASC) 5 mg tablet Take 1 tablet (5 mg total) by mouth 2 (two) times a day  Qty: 180 tablet, Refills: 2    Associated Diagnoses: Essential hypertension      aspirin 81 MG tablet Take 81 mg by mouth every morning  B Complex Vitamins (B-COMPLEX/B-12 PO) Take 1,500 mg by mouth 2 (two) times a day        carvedilol (COREG) 12 5 mg tablet Take 1 tablet (12 5 mg total) by mouth 2 (two) times a day with meals  Qty: 180 tablet, Refills: 2    Associated Diagnoses: Essential hypertension      chlorhexidine (PERIDEX) 0 12 % solution RINSE WITH 15 MLS TWICE A DAY ONCE IN THE MORNING AND AT BEDTIME      Cranberry 51362 MG CAPS Take 1 tablet by mouth 2 (two) times a day        cyanocobalamin (VITAMIN B-12) 100 MCG tablet Take 100 mcg by mouth daily      desoximetasone (TOPICORT) 0 25 % cream       diclofenac sodium (VOLTAREN) 1 % Apply 2 g topically 2 (two) times a day  Qty: 1 Tube, Refills: 5    Associated Diagnoses: Arthritis      losartan (COZAAR) 50 mg tablet take 2 tablets by mouth once daily  Qty: 180 tablet, Refills: 2    Associated Diagnoses: Essential hypertension      methimazole (TAPAZOLE) 5 mg tablet take 1 tablet by mouth once daily MONDAY TO FRIDAY SKIP ON SATURDAYS AND SUNDAYS  Qty: 60 tablet, Refills: 12    Associated Diagnoses: Hyperthyroidism      triamterene-hydrochlorothiazide (DYAZIDE) 37 5-25 mg per capsule Take 1 capsule by mouth every morning PRN      venlafaxine (EFFEXOR-XR) 150 mg 24 hr capsule Take 150 mg by mouth daily PRN             No discharge procedures on file      PDMP Review       Value Time User    PDMP Reviewed  Yes 4/25/2022 12:36 PM Unique Stevens MD          ED Provider  Electronically Signed by           Sofy Arellano MD  05/14/22 0233

## 2022-05-14 NOTE — ASSESSMENT & PLAN NOTE
· Patient presented to the ED today after calling her daughter and telling her she wasn't feeling well  · Upon arrival, significant hypoxia-- stable in the high 80s to low 90s on 4-5L NC at this time  · CTA PE study: Acute pulmonary emboli in right upper, middle and lower lobe segmental branches  No evidence of acutely elevated right-sided pressure    · In setting of likely metastatic malignancy   · Lovenox 1mg/kg SQ BID  · Echo IP vs  OP  · D/W CM re: NOAC  · Wean O2 as tolerated-- may require home O2 evaluation

## 2022-05-14 NOTE — ASSESSMENT & PLAN NOTE
Lab Results   Component Value Date    EGFR 51 05/13/2022    EGFR 50 03/18/2022    EGFR 48 11/18/2021    CREATININE 0 97 05/13/2022    CREATININE 0 98 03/18/2022    CREATININE 1 03 11/18/2021   · Cr overall stable at baseline

## 2022-05-14 NOTE — ASSESSMENT & PLAN NOTE
· Known to patient and family; had PET scan that showed additional metabolic uptake in breast as well  · Per review of OP records-- family has chosen to not disclose this to the patient, and no further investigation/management is wanted

## 2022-05-14 NOTE — PLAN OF CARE
Problem: Potential for Falls  Goal: Patient will remain free of falls  Description: INTERVENTIONS:  - Educate patient/family on patient safety including physical limitations  - Instruct patient to call for assistance with activity   - Consult OT/PT to assist with strengthening/mobility   - Keep Call bell within reach  - Keep bed low and locked with side rails adjusted as appropriate  - Keep care items and personal belongings within reach  - Initiate and maintain comfort rounds  - Make Fall Risk Sign visible to staff  - Offer Toileting every  Hours, in advance of need  - Initiate/Maintain alarm  - Obtain necessary fall risk management equipment:   - Apply yellow socks and bracelet for high fall risk patients  - Consider moving patient to room near nurses station  Outcome: Progressing     Problem: MOBILITY - ADULT  Goal: Maintain or return to baseline ADL function  Description: INTERVENTIONS:  -  Assess patient's ability to carry out ADLs; assess patient's baseline for ADL function and identify physical deficits which impact ability to perform ADLs (bathing, care of mouth/teeth, toileting, grooming, dressing, etc )  - Assess/evaluate cause of self-care deficits   - Assess range of motion  - Assess patient's mobility; develop plan if impaired  - Assess patient's need for assistive devices and provide as appropriate  - Encourage maximum independence but intervene and supervise when necessary  - Involve family in performance of ADLs  - Assess for home care needs following discharge   - Consider OT consult to assist with ADL evaluation and planning for discharge  - Provide patient education as appropriate  Outcome: Progressing  Goal: Maintains/Returns to pre admission functional level  Description: INTERVENTIONS:  - Perform BMAT or MOVE assessment daily    - Set and communicate daily mobility goal to care team and patient/family/caregiver     - Collaborate with rehabilitation services on mobility goals if consulted  - Perform Range of Motion  times a day  - Reposition patient every  hours    - Dangle patient  times a day  - Stand patient  times a day  - Ambulate patient  times a day  - Out of bed to chair  times a day   - Out of bed for meals  times a day  - Out of bed for toileting  - Record patient progress and toleration of activity level   Outcome: Progressing     Problem: RESPIRATORY - ADULT  Goal: Achieves optimal ventilation and oxygenation  Description: INTERVENTIONS:  - Assess for changes in respiratory status  - Assess for changes in mentation and behavior  - Position to facilitate oxygenation and minimize respiratory effort  - Oxygen administered by appropriate delivery if ordered  - Initiate smoking cessation education as indicated  - Encourage broncho-pulmonary hygiene including cough, deep breathe, Incentive Spirometry  - Assess the need for suctioning and aspirate as needed  - Assess and instruct to report SOB or any respiratory difficulty  - Respiratory Therapy support as indicated  Outcome: Progressing     Problem: SKIN/TISSUE INTEGRITY - ADULT  Goal: Skin Integrity remains intact(Skin Breakdown Prevention)  Description: Assess:  -Perform Hudson assessment every   -Clean and moisturize skin every   -Inspect skin when repositioning, toileting, and assisting with ADLS  -Assess under medical devices such as  every   -Assess extremities for adequate circulation and sensation     Bed Management:  -Have minimal linens on bed & keep smooth, unwrinkled  -Change linens as needed when moist or perspiring  -Avoid sitting or lying in one position for more than  hours while in bed  -Keep HOB at degrees     Toileting:  -Offer bedside commode  -Assess for incontinence every   -Use incontinent care products after each incontinent episode such as     Activity:  -Mobilize patient  times a day  -Encourage activity and walks on unit  -Encourage or provide ROM exercises   -Turn and reposition patient every  Hours  -Use appropriate equipment to lift or move patient in bed  -Instruct/ Assist with weight shifting every  when out of bed in chair  -Consider limitation of chair time  hour intervals    Skin Care:  -Avoid use of baby powder, tape, friction and shearing, hot water or constrictive clothing  -Relieve pressure over bony prominences using   -Do not massage red bony areas    Next Steps:  -Teach patient strategies to minimize risks such as    -Consider consults to  interdisciplinary teams such as   Outcome: Progressing  Goal: Incision(s), wounds(s) or drain site(s) healing without S/S of infection  Description: INTERVENTIONS  - Assess and document dressing, incision, wound bed, drain sites and surrounding tissue  - Provide patient and family education  - Perform skin care/dressing changes every  Outcome: Progressing  Goal: Pressure injury heals and does not worsen  Description: Interventions:  - Implement low air loss mattress or specialty surface (Criteria met)  - Apply silicone foam dressing  - Instruct/assist with weight shifting every  minutes when in chair   - Limit chair time to  hour intervals  - Use special pressure reducing interventions such as  when in chair   - Apply fecal or urinary incontinence containment device   - Perform passive or active ROM every   - Turn and reposition patient & offload bony prominences every  hours   - Utilize friction reducing device or surface for transfers   - Consider consults to  interdisciplinary teams such as   - Use incontinent care products after each incontinent episode such as   - Consider nutrition services referral as needed  Outcome: Progressing

## 2022-05-15 LAB
ANION GAP SERPL CALCULATED.3IONS-SCNC: 7 MMOL/L (ref 4–13)
ATRIAL RATE: 100 BPM
ATRIAL RATE: 45 BPM
BASOPHILS # BLD AUTO: 0.04 THOUSANDS/ΜL (ref 0–0.1)
BASOPHILS NFR BLD AUTO: 0 % (ref 0–1)
BUN SERPL-MCNC: 29 MG/DL (ref 5–25)
CALCIUM SERPL-MCNC: 9.2 MG/DL (ref 8.4–10.2)
CHLORIDE SERPL-SCNC: 105 MMOL/L (ref 96–108)
CO2 SERPL-SCNC: 29 MMOL/L (ref 21–32)
CREAT SERPL-MCNC: 0.92 MG/DL (ref 0.6–1.3)
EOSINOPHIL # BLD AUTO: 0.16 THOUSAND/ΜL (ref 0–0.61)
EOSINOPHIL NFR BLD AUTO: 1 % (ref 0–6)
ERYTHROCYTE [DISTWIDTH] IN BLOOD BY AUTOMATED COUNT: 16.8 % (ref 11.6–15.1)
GFR SERPL CREATININE-BSD FRML MDRD: 54 ML/MIN/1.73SQ M
GLUCOSE SERPL-MCNC: 166 MG/DL (ref 65–140)
HCT VFR BLD AUTO: 38.1 % (ref 34.8–46.1)
HGB BLD-MCNC: 11.8 G/DL (ref 11.5–15.4)
IMM GRANULOCYTES # BLD AUTO: 0.06 THOUSAND/UL (ref 0–0.2)
IMM GRANULOCYTES NFR BLD AUTO: 1 % (ref 0–2)
LYMPHOCYTES # BLD AUTO: 2.31 THOUSANDS/ΜL (ref 0.6–4.47)
LYMPHOCYTES NFR BLD AUTO: 20 % (ref 14–44)
MCH RBC QN AUTO: 26.5 PG (ref 26.8–34.3)
MCHC RBC AUTO-ENTMCNC: 31 G/DL (ref 31.4–37.4)
MCV RBC AUTO: 85 FL (ref 82–98)
MONOCYTES # BLD AUTO: 0.54 THOUSAND/ΜL (ref 0.17–1.22)
MONOCYTES NFR BLD AUTO: 5 % (ref 4–12)
NEUTROPHILS # BLD AUTO: 8.33 THOUSANDS/ΜL (ref 1.85–7.62)
NEUTS SEG NFR BLD AUTO: 73 % (ref 43–75)
NRBC BLD AUTO-RTO: 0 /100 WBCS
P AXIS: 74 DEGREES
P AXIS: 76 DEGREES
PLATELET # BLD AUTO: 390 THOUSANDS/UL (ref 149–390)
PMV BLD AUTO: 9.9 FL (ref 8.9–12.7)
POTASSIUM SERPL-SCNC: 3.8 MMOL/L (ref 3.5–5.3)
PR INTERVAL: 220 MS
PR INTERVAL: 228 MS
QRS AXIS: 135 DEGREES
QRS AXIS: 151 DEGREES
QRSD INTERVAL: 70 MS
QRSD INTERVAL: 76 MS
QT INTERVAL: 372 MS
QT INTERVAL: 384 MS
QTC INTERVAL: 451 MS
QTC INTERVAL: 486 MS
RBC # BLD AUTO: 4.46 MILLION/UL (ref 3.81–5.12)
SODIUM SERPL-SCNC: 141 MMOL/L (ref 135–147)
T WAVE AXIS: -4 DEGREES
T WAVE AXIS: 30 DEGREES
VENTRICULAR RATE: 88 BPM
VENTRICULAR RATE: 96 BPM
WBC # BLD AUTO: 11.44 THOUSAND/UL (ref 4.31–10.16)

## 2022-05-15 PROCEDURE — 99232 SBSQ HOSP IP/OBS MODERATE 35: CPT | Performed by: INTERNAL MEDICINE

## 2022-05-15 PROCEDURE — 85025 COMPLETE CBC W/AUTO DIFF WBC: CPT

## 2022-05-15 PROCEDURE — 80048 BASIC METABOLIC PNL TOTAL CA: CPT

## 2022-05-15 PROCEDURE — 93010 ELECTROCARDIOGRAM REPORT: CPT | Performed by: INTERNAL MEDICINE

## 2022-05-15 RX ADMIN — ACETAMINOPHEN 650 MG: 325 TABLET, FILM COATED ORAL at 21:18

## 2022-05-15 RX ADMIN — ASPIRIN 81 MG: 81 TABLET, COATED ORAL at 09:06

## 2022-05-15 RX ADMIN — AMLODIPINE BESYLATE 5 MG: 5 TABLET ORAL at 09:06

## 2022-05-15 RX ADMIN — ENOXAPARIN SODIUM 60 MG: 60 INJECTION SUBCUTANEOUS at 09:05

## 2022-05-15 RX ADMIN — CARVEDILOL 12.5 MG: 12.5 TABLET, FILM COATED ORAL at 17:19

## 2022-05-15 RX ADMIN — ACETAMINOPHEN 650 MG: 325 TABLET, FILM COATED ORAL at 09:05

## 2022-05-15 RX ADMIN — AMLODIPINE BESYLATE 5 MG: 5 TABLET ORAL at 17:19

## 2022-05-15 RX ADMIN — CARVEDILOL 12.5 MG: 12.5 TABLET, FILM COATED ORAL at 09:06

## 2022-05-15 RX ADMIN — Medication 6 MG: at 21:18

## 2022-05-15 RX ADMIN — ACETAMINOPHEN 650 MG: 325 TABLET, FILM COATED ORAL at 04:58

## 2022-05-15 RX ADMIN — VENLAFAXINE HYDROCHLORIDE 150 MG: 150 CAPSULE, EXTENDED RELEASE ORAL at 09:05

## 2022-05-15 RX ADMIN — LOSARTAN POTASSIUM 100 MG: 50 TABLET, FILM COATED ORAL at 09:05

## 2022-05-15 RX ADMIN — APIXABAN 10 MG: 5 TABLET, FILM COATED ORAL at 17:19

## 2022-05-15 NOTE — PROGRESS NOTES
[facility]  Progress Note - Ever Shantel 8/5/1930, 80 y o  female MRN: [id number]  Unit/Bed#: S -01 Encounter: [id number]  Primary Care Provider: Sofy Antonio MD   Date and time admitted to hospital: 5/13/2022  5:36 PM    * Multiple subsegmental pulmonary emboli without acute cor pulmonale ([address] )  Assessment & Plan  · Patient presented to the ED today after calling her daughter and telling her she wasn't feeling well  · Upon arrival, significant hypoxia-- stable in the high 80s to low 90s on 4-5L NC at this time  · CTA PE study: Acute pulmonary emboli in right upper, middle and lower lobe segmental branches  No evidence of acutely elevated right-sided pressure  · In setting of likely metastatic malignancy   · Lovenox 1mg/kg SQ BID  · Daughter and patient is agreeable for Eliquis as outpatient, price check done and this was found to be 52 usd per month  · Given that the patient has lung mass, this will likely be lifelong anticoagulation  · Wean O2 as tolerated, home O2 eval tomorrow  · Still pending PT OT evaluation  Patient and daughter are open for post acute rehab      Acute respiratory failure with hypoxia (HCC)  Assessment & Plan  · Patient w/ low O2 saturations on arrival in the ED-- 82% on RA  · In setting of acute PE  · Requiring 4-5L O2 at this time to maintain saturations 88-90%  · tx underlying cause  · Wean O2 as tolerated  · If O2 requirements escalate, will discuss further w/ patient's daughter Ranelle Badillo    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  Assessment & Plan  · Known to patient and family; had PET scan that showed additional metabolic uptake in breast as well  · Per review of OP records-- family has chosen to not disclose this to the patient, and no further investigation/management is wanted      CKD (chronic kidney disease) stage 3, GFR 30-59 ml/min [facility])  Assessment & Plan  Lab Results   Component Value Date    EGFR 54 05/15/2022    EGFR 60 05/14/2022    EGFR 51 2022    CREATININE 0 92 05/15/2022    CREATININE 0 84 2022    CREATININE 0 97 2022   · Cr overall stable at baseline at 0 8-0 9  Essential hypertension  Assessment & Plan  · Continue Norvasc, Coreg, Cozaar, and Diazide        VTE Pharmacologic Prophylaxis: VTE Score: 11 High Risk (Score >/= 5) - Pharmacological DVT Prophylaxis Ordered: enoxaparin (Lovenox)  Sequential Compression Devices Ordered  Patient Centered Rounds: I performed bedside rounds with nursing staff today  Discussions with Specialists or Other Care Team Provider: None    Education and Discussions with Family / Patient: Updated  (daughter) at bedside  Current Length of Stay: 2 day(s)  Current Patient Status: Inpatient   Discharge Plan: Anticipate discharge in 24-48 hrs to Pending PT OT eval    Code Status: Level 1 - Full Code    Subjective:   Patient is breathing a lot better today  She is currently sitting down on the bed, comfortably  Is normally on room air, currently has 6 L via nasal cannula  Will need home O2 eval prior to discharge  No other subjective complaints noted    Objective:     Vitals:   Temp (24hrs), Av 8 °F (36 6 °C), Min:97 5 °F (36 4 °C), Max:98 1 °F (36 7 °C)    Temp:  [97 5 °F (36 4 °C)-98 1 °F (36 7 °C)] 98 1 °F (36 7 °C)  HR:  [48-77] 55  Resp:  [18-19] 19  BP: (105-147)/(55-90) 130/90  SpO2:  [91 %-93 %] 93 %  Body mass index is 26 03 kg/m²  Input and Output Summary (last 24 hours): Intake/Output Summary (Last 24 hours) at 5/15/2022 1353  Last data filed at 5/15/2022 0905  Gross per 24 hour   Intake 120 ml   Output 450 ml   Net -330 ml       Physical Exam:   Physical Exam  Vitals and nursing note reviewed  Constitutional:       General: She is not in acute distress  Appearance: She is well-developed  She is obese  She is ill-appearing  HENT:      Head: Normocephalic and atraumatic        Nose: Nose normal       Mouth/Throat:      Mouth: Mucous membranes are moist    Eyes:      Conjunctiva/sclera: Conjunctivae normal    Cardiovascular:      Rate and Rhythm: Normal rate and regular rhythm  Heart sounds: No murmur heard  Pulmonary:      Effort: Pulmonary effort is normal  No respiratory distress  Breath sounds: Normal breath sounds  No wheezing, rhonchi or rales  Comments: 6 L via nasal cannula  Abdominal:      Palpations: Abdomen is soft  Tenderness: There is no abdominal tenderness  Musculoskeletal:      Cervical back: Neck supple  Right lower leg: No edema  Left lower leg: No edema  Skin:     General: Skin is warm and dry  Capillary Refill: Capillary refill takes less than 2 seconds  Neurological:      General: No focal deficit present  Mental Status: She is alert and oriented to person, place, and time  Psychiatric:         Mood and Affect: Mood normal           Additional Data:     Labs:  Results from last 7 days   Lab Units 05/15/22  1037   WBC Thousand/uL 11 44*   HEMOGLOBIN g/dL 11 8   HEMATOCRIT % 38 1   PLATELETS Thousands/uL 390   NEUTROS PCT % 73   LYMPHS PCT % 20   MONOS PCT % 5   EOS PCT % 1     Results from last 7 days   Lab Units 05/15/22  1037 05/14/22  1032 05/13/22  1818   SODIUM mmol/L 141   < > 141   POTASSIUM mmol/L 3 8   < > 4 3   CHLORIDE mmol/L 105   < > 107   CO2 mmol/L 29   < > 25   BUN mg/dL 29*   < > 36*   CREATININE mg/dL 0 92   < > 0 97   ANION GAP mmol/L 7   < > 9   CALCIUM mg/dL 9 2   < > 9 6   ALBUMIN g/dL  --   --  3 6   TOTAL BILIRUBIN mg/dL  --   --  0 59   ALK PHOS U/L  --   --  130*   ALT U/L  --   --  12   AST U/L  --   --  12*   GLUCOSE RANDOM mg/dL 166*   < > 114    < > = values in this interval not displayed       Results from last 7 days   Lab Units 05/14/22  1032   INR  1 21*                   Lines/Drains:  Invasive Devices  Report    Peripheral Intravenous Line  Duration           Peripheral IV 05/13/22 Left Antecubital 2 days                  Telemetry:  Telemetry Orders (From admission, onward)             24 Hour Telemetry Monitoring  Continuous x 24 Hours (Telem)        Expiring   References:    Telemetry Guidelines   Question:  Reason for 24 Hour Telemetry  Answer:  Pulmonary Embolism - 24 hours without resp  compromise, dysrhythmias, hemodynamically stable                 Telemetry Reviewed: Normal Sinus Rhythm  Indication for Continued Telemetry Use: PE             Imaging: Reviewed radiology reports from this admission including: chest CT scan    Recent Cultures (last 7 days):         Last 24 Hours Medication List:   Current Facility-Administered Medications   Medication Dose Route Frequency Provider Last Rate    acetaminophen  650 mg Oral Q6H Nancy Mora PA-C      ALPRAZolam  0 25 mg Oral TID PRN Nancy Mora PA-C      amLODIPine  5 mg Oral BID Nancy Mora PA-C      aspirin  81 mg Oral Daily Nancy Mora PA-C      carvedilol  12 5 mg Oral BID With Meals Nancy Mora PA-C      enoxaparin  1 mg/kg Subcutaneous Q12H [facility] Nancy Mora PA-C      losartan  100 mg Oral Daily Nancy Mora PA-C      melatonin  6 mg Oral HS Nancy Mora PA-C      [START ON 5/16/2022] methimazole  5 mg Oral Once per day on Mon Wed Fri Nancy Mora PA-C      ondansetron  4 mg Intravenous Q6H PRN Nancy Mora PA-C      venlafaxine  150 mg Oral Daily Nancy Mora PA-C          Today, Patient Was Seen By: Suzanne Schulz MD    **Please Note: This note may have been constructed using a voice recognition system  **

## 2022-05-15 NOTE — PLAN OF CARE
Problem: Potential for Falls  Goal: Patient will remain free of falls  Description: INTERVENTIONS:  - Educate patient/family on patient safety including physical limitations  - Instruct patient to call for assistance with activity   - Consult OT/PT to assist with strengthening/mobility   - Keep Call bell within reach  - Keep bed low and locked with side rails adjusted as appropriate  - Keep care items and personal belongings within reach  - Initiate and maintain comfort rounds  - Make Fall Risk Sign visible to staff  - Offer Toileting every  Hours, in advance of need  - Initiate/Maintain alarm  - Obtain necessary fall risk management equipment:   - Apply yellow socks and bracelet for high fall risk patients  - Consider moving patient to room near nurses station  Outcome: Progressing     Problem: MOBILITY - ADULT  Goal: Maintain or return to baseline ADL function  Description: INTERVENTIONS:  -  Assess patient's ability to carry out ADLs; assess patient's baseline for ADL function and identify physical deficits which impact ability to perform ADLs (bathing, care of mouth/teeth, toileting, grooming, dressing, etc )  - Assess/evaluate cause of self-care deficits   - Assess range of motion  - Assess patient's mobility; develop plan if impaired  - Assess patient's need for assistive devices and provide as appropriate  - Encourage maximum independence but intervene and supervise when necessary  - Involve family in performance of ADLs  - Assess for home care needs following discharge   - Consider OT consult to assist with ADL evaluation and planning for discharge  - Provide patient education as appropriate  Outcome: Progressing  Goal: Maintains/Returns to pre admission functional level  Description: INTERVENTIONS:  - Perform BMAT or MOVE assessment daily    - Set and communicate daily mobility goal to care team and patient/family/caregiver     - Collaborate with rehabilitation services on mobility goals if consulted  - Perform Range of Motion  times a day  - Reposition patient every  hours    - Dangle patient  times a day  - Stand patient  times a day  - Ambulate patient  times a day  - Out of bed to chair  times a day   - Out of bed for meals  times a day  - Out of bed for toileting  - Record patient progress and toleration of activity level   Outcome: Progressing     Problem: RESPIRATORY - ADULT  Goal: Achieves optimal ventilation and oxygenation  Description: INTERVENTIONS:  - Assess for changes in respiratory status  - Assess for changes in mentation and behavior  - Position to facilitate oxygenation and minimize respiratory effort  - Oxygen administered by appropriate delivery if ordered  - Initiate smoking cessation education as indicated  - Encourage broncho-pulmonary hygiene including cough, deep breathe, Incentive Spirometry  - Assess the need for suctioning and aspirate as needed  - Assess and instruct to report SOB or any respiratory difficulty  - Respiratory Therapy support as indicated  Outcome: Progressing     Problem: SKIN/TISSUE INTEGRITY - ADULT  Goal: Skin Integrity remains intact(Skin Breakdown Prevention)  Description: Assess:  -Perform Hudson assessment every   -Clean and moisturize skin every   -Inspect skin when repositioning, toileting, and assisting with ADLS  -Assess under medical devices such as  every   -Assess extremities for adequate circulation and sensation     Bed Management:  -Have minimal linens on bed & keep smooth, unwrinkled  -Change linens as needed when moist or perspiring  -Avoid sitting or lying in one position for more than  hours while in bed  -Keep HOB at degrees     Toileting:  -Offer bedside commode  -Assess for incontinence every   -Use incontinent care products after each incontinent episode such as     Activity:  -Mobilize patient  times a day  -Encourage activity and walks on unit  -Encourage or provide ROM exercises   -Turn and reposition patient every  Hours  -Use appropriate equipment to lift or move patient in bed  -Instruct/ Assist with weight shifting every  when out of bed in chair  -Consider limitation of chair time  hour intervals    Skin Care:  -Avoid use of baby powder, tape, friction and shearing, hot water or constrictive clothing  -Relieve pressure over bony prominences using   -Do not massage red bony areas    Next Steps:  -Teach patient strategies to minimize risks such as    -Consider consults to  interdisciplinary teams such as   Outcome: Progressing  Goal: Incision(s), wounds(s) or drain site(s) healing without S/S of infection  Description: INTERVENTIONS  - Assess and document dressing, incision, wound bed, drain sites and surrounding tissue  - Provide patient and family education  - Perform skin care/dressing changes every   Outcome: Progressing  Goal: Pressure injury heals and does not worsen  Description: Interventions:  - Implement low air loss mattress or specialty surface (Criteria met)  - Apply silicone foam dressing  - Instruct/assist with weight shifting every  minutes when in chair   - Limit chair time to  hour intervals  - Use special pressure reducing interventions such as  when in chair   - Apply fecal or urinary incontinence containment device   - Perform passive or active ROM every   - Turn and reposition patient & offload bony prominences every  hours   - Utilize friction reducing device or surface for transfers   - Consider consults to  interdisciplinary teams such as   - Use incontinent care products after each incontinent episode such as   - Consider nutrition services referral as needed  Outcome: Progressing     Problem: Prexisting or High Potential for Compromised Skin Integrity  Goal: Skin integrity is maintained or improved  Description: INTERVENTIONS:  - Identify patients at risk for skin breakdown  - Assess and monitor skin integrity  - Assess and monitor nutrition and hydration status  - Monitor labs   - Assess for incontinence   - Turn and reposition patient  - Assist with mobility/ambulation  - Relieve pressure over bony prominences  - Avoid friction and shearing  - Provide appropriate hygiene as needed including keeping skin clean and dry  - Evaluate need for skin moisturizer/barrier cream  - Collaborate with interdisciplinary team   - Patient/family teaching  - Consider wound care consult   Outcome: Progressing

## 2022-05-15 NOTE — ASSESSMENT & PLAN NOTE
· Patient presented to the ED today after calling her daughter and telling her she wasn't feeling well  · Upon arrival, significant hypoxia-- stable in the high 80s to low 90s on 4-5L NC at this time  · CTA PE study: Acute pulmonary emboli in right upper, middle and lower lobe segmental branches  No evidence of acutely elevated right-sided pressure  · In setting of likely metastatic malignancy   · Lovenox 1mg/kg SQ BID  · Daughter and patient is agreeable for Eliquis as outpatient, price check done and this was found to be 52 usd per month  · Given that the patient has lung mass, this will likely be lifelong anticoagulation  · Wean O2 as tolerated, home O2 eval tomorrow  · Still pending PT OT evaluation  Patient and daughter are open for post acute rehab

## 2022-05-15 NOTE — CASE MANAGEMENT
Case Management Progress Note    Patient name Rodney Pinedaoter  Location [address]/S -01 MRN [id number]  : 1930 Date 5/15/2022       LOS (days): 2  Geometric Mean LOS (GMLOS) (days):   Days to GMLOS:        OBJECTIVE:        Current admission status: Inpatient  Pref[facility]acy:   [facility] [address] [place] [id number]  Phone: [phone] Fax: [phone]    Primary Care Provider: Molly Newman MD    Primary Insurance: MEDICARE  Secondary Insurance: [facility]    PROGRESS NOTE:    Per the Pt's local pharmacy of [facility], the Pt would have a financial responsibility of $47 for Eliquis for a quantity of 60  CM made Dr Leiv Loja aware

## 2022-05-15 NOTE — ASSESSMENT & PLAN NOTE
· Patient w/ low O2 saturations on arrival in the ED-- 82% on RA  · In setting of acute PE  · Requiring 4-5L O2 at this time to maintain saturations 88-90%  · tx underlying cause  · Wean O2 as tolerated  · If O2 requirements escalate, will discuss further w/ patient's daughter Mason Dimas

## 2022-05-15 NOTE — ASSESSMENT & PLAN NOTE
Lab Results   Component Value Date    EGFR 54 05/15/2022    EGFR 60 05/14/2022    EGFR 51 05/13/2022    CREATININE 0 92 05/15/2022    CREATININE 0 84 05/14/2022    CREATININE 0 97 05/13/2022   · Cr overall stable at baseline at 0 8-0 9

## 2022-05-16 ENCOUNTER — TELEPHONE (OUTPATIENT)
Dept: FAMILY MEDICINE CLINIC | Facility: CLINIC | Age: 87
End: 2022-05-16

## 2022-05-16 LAB
ANION GAP SERPL CALCULATED.3IONS-SCNC: 6 MMOL/L (ref 4–13)
BUN SERPL-MCNC: 29 MG/DL (ref 5–25)
CALCIUM SERPL-MCNC: 9.3 MG/DL (ref 8.4–10.2)
CHLORIDE SERPL-SCNC: 105 MMOL/L (ref 96–108)
CO2 SERPL-SCNC: 31 MMOL/L (ref 21–32)
CREAT SERPL-MCNC: 0.85 MG/DL (ref 0.6–1.3)
ERYTHROCYTE [DISTWIDTH] IN BLOOD BY AUTOMATED COUNT: 16.5 % (ref 11.6–15.1)
GFR SERPL CREATININE-BSD FRML MDRD: 60 ML/MIN/1.73SQ M
GLUCOSE SERPL-MCNC: 90 MG/DL (ref 65–140)
HCT VFR BLD AUTO: 38.2 % (ref 34.8–46.1)
HGB BLD-MCNC: 11.7 G/DL (ref 11.5–15.4)
MCH RBC QN AUTO: 26.2 PG (ref 26.8–34.3)
MCHC RBC AUTO-ENTMCNC: 30.6 G/DL (ref 31.4–37.4)
MCV RBC AUTO: 86 FL (ref 82–98)
PLATELET # BLD AUTO: 393 THOUSANDS/UL (ref 149–390)
PMV BLD AUTO: 10 FL (ref 8.9–12.7)
POTASSIUM SERPL-SCNC: 3.8 MMOL/L (ref 3.5–5.3)
RBC # BLD AUTO: 4.46 MILLION/UL (ref 3.81–5.12)
SODIUM SERPL-SCNC: 142 MMOL/L (ref 135–147)
WBC # BLD AUTO: 10.8 THOUSAND/UL (ref 4.31–10.16)

## 2022-05-16 PROCEDURE — 85027 COMPLETE CBC AUTOMATED: CPT

## 2022-05-16 PROCEDURE — 94761 N-INVAS EAR/PLS OXIMETRY MLT: CPT

## 2022-05-16 PROCEDURE — 99232 SBSQ HOSP IP/OBS MODERATE 35: CPT | Performed by: INTERNAL MEDICINE

## 2022-05-16 PROCEDURE — 80048 BASIC METABOLIC PNL TOTAL CA: CPT

## 2022-05-16 RX ADMIN — LOSARTAN POTASSIUM 100 MG: 50 TABLET, FILM COATED ORAL at 08:46

## 2022-05-16 RX ADMIN — APIXABAN 10 MG: 5 TABLET, FILM COATED ORAL at 08:46

## 2022-05-16 RX ADMIN — APIXABAN 10 MG: 5 TABLET, FILM COATED ORAL at 17:28

## 2022-05-16 RX ADMIN — CARVEDILOL 12.5 MG: 12.5 TABLET, FILM COATED ORAL at 17:28

## 2022-05-16 RX ADMIN — ACETAMINOPHEN 650 MG: 325 TABLET, FILM COATED ORAL at 22:10

## 2022-05-16 RX ADMIN — VENLAFAXINE HYDROCHLORIDE 150 MG: 150 CAPSULE, EXTENDED RELEASE ORAL at 08:46

## 2022-05-16 RX ADMIN — ACETAMINOPHEN 650 MG: 325 TABLET, FILM COATED ORAL at 10:13

## 2022-05-16 RX ADMIN — CARVEDILOL 12.5 MG: 12.5 TABLET, FILM COATED ORAL at 08:46

## 2022-05-16 RX ADMIN — METHIMAZOLE 5 MG: 5 TABLET ORAL at 08:46

## 2022-05-16 RX ADMIN — Medication 6 MG: at 22:10

## 2022-05-16 RX ADMIN — AMLODIPINE BESYLATE 5 MG: 5 TABLET ORAL at 17:28

## 2022-05-16 RX ADMIN — ASPIRIN 81 MG: 81 TABLET, COATED ORAL at 08:46

## 2022-05-16 RX ADMIN — AMLODIPINE BESYLATE 5 MG: 5 TABLET ORAL at 08:46

## 2022-05-16 NOTE — PHYSICAL THERAPY NOTE
Physical Therapy Cancellation Note         05/16/22 0852   PT Last Visit   PT Visit Date 05/16/22   Note Type   Note type Cancelled Session; Evaluation   Cancel Reasons Medical status   Additional Comments referral received for PT eval and tx  attempted to see pt for eval but Randy Hodge reports pt is not appropriate due to respiratory status  will follow and initiate PT as appropriate       Loida Zapata, PT

## 2022-05-16 NOTE — OCCUPATIONAL THERAPY NOTE
Occupational Therapy Cancel        Patient Name: Matthew Echevarria  RBRLL'L Date: 5/16/2022 05/16/22 1145   OT Last Visit   OT Visit Date 05/16/22   Note Type   Note type Cancelled Session   Cancel Reasons Medical status   Additional Comments Recieved orders for OT eval and reviewed chart  PT attempted to see eval this AM but Eugenio Santos reports pt is not appropriate due to respiratory status  will follow and assess when medically appropriate         GOLDEN Oquendo, OTR/L

## 2022-05-16 NOTE — ASSESSMENT & PLAN NOTE
Lab Results   Component Value Date    EGFR 60 05/16/2022    EGFR 54 05/15/2022    EGFR 60 05/14/2022    CREATININE 0 85 05/16/2022    CREATININE 0 92 05/15/2022    CREATININE 0 84 05/14/2022   · Cr overall stable at baseline at 0 8-0 9

## 2022-05-16 NOTE — ASSESSMENT & PLAN NOTE
· Patient presented to the ED today after calling her daughter and telling her she wasn't feeling well  · Upon arrival, significant hypoxia-- stable in the high 80s to low 90s on 4-5L NC at this time  · CTA PE study: Acute pulmonary emboli in right upper, middle and lower lobe segmental branches  No evidence of acutely elevated right-sided pressure  · In setting of likely metastatic malignancy   · Lovenox 1mg/kg SQ BID was started, now discontinue and patient is on Eliquis   · Daughter and patient is agreeable for Eliquis as outpatient, price check done and this was found to be $47 per month  · Given that the patient has lung mass, this will likely be lifelong anticoagulation  · Wean O2 as tolerated, home O2 eval performed today   · Still pending PT OT evaluation  Patient and daughter are open for post acute rehab

## 2022-05-16 NOTE — PLAN OF CARE
Problem: Potential for Falls  Goal: Patient will remain free of falls  Description: INTERVENTIONS:  - Educate patient/family on patient safety including physical limitations  - Instruct patient to call for assistance with activity   - Consult OT/PT to assist with strengthening/mobility   - Keep Call bell within reach  - Keep bed low and locked with side rails adjusted as appropriate  - Keep care items and personal belongings within reach  - Initiate and maintain comfort rounds  - Make Fall Risk Sign visible to staff  - Offer Toileting every 2 Hours, in advance of need  - Initiate/Maintain 2alarm  - Obtain necessary fall risk management equipment: 2  - Apply yellow socks and bracelet for high fall risk patients  - Consider moving patient to room near nurses station  Outcome: Progressing English

## 2022-05-16 NOTE — CASE MANAGEMENT
Case Management Progress Note    Patient name Jim Ray  Location [address] 332/S -01 MRN [id number]  : 1930 Date 2022       LOS (days): 3  Geometric Mean LOS (GMLOS) (days): 4 10  Days to GMLOS:1 4        OBJECTIVE:        Current admission status: Inpatient  Preferred Pharmacy:   [address] [place] [id number]  Phone: [phone] Fax: [phone]    Primary Care Provider: Jarrod Esteban MD    Primary Insurance: MEDICARE  Secondary Insurance: [facility]    PROGRESS NOTE:    Per rounds, patient stable for d/c pending PT eval  Same discussed with PT this afternoon, and he reports evaluation was cancelled due to respiratory issues this AM - will try to see as able  Per respiratory evaluation, patient will need 4L o2 at discharge  Attempted to meet with patient to discuss same, but patient sleeping  Call made to daughter, Mavis Burkett ([phone]) and  left requesting return call to complete assessment and discuss d/c plan

## 2022-05-16 NOTE — ASSESSMENT & PLAN NOTE
· Patient w/ low O2 saturations on arrival in the ED-- 82% on RA  · In setting of acute PE  · Requiring 4-5L O2 at this time to maintain saturations 88-90%  · Wean O2 as tolerated  · See plan above

## 2022-05-16 NOTE — PROGRESS NOTES
[facility]  Progress Note - Jessica Chakraborty 8/5/1930, 80 y o  female MRN: [id number]  Unit/Bed#: S -01 Encounter: [id number]  Primary Care Provider: Jose Alejandro Villa MD   Date and time admitted to hospital: 5/13/2022  5:36 PM    * Multiple subsegmental pulmonary emboli without acute cor pulmonale ([address] )  Assessment & Plan  · Patient presented to the ED today after calling her daughter and telling her she wasn't feeling well  · Upon arrival, significant hypoxia-- stable in the high 80s to low 90s on 4-5L NC at this time  · CTA PE study: Acute pulmonary emboli in right upper, middle and lower lobe segmental branches  No evidence of acutely elevated right-sided pressure  · In setting of likely metastatic malignancy   · Lovenox 1mg/kg SQ BID was started, now discontinue and patient is on Eliquis   · Daughter and patient is agreeable for Eliquis as outpatient, price check done and this was found to be $47 per month  · Given that the patient has lung mass, this will likely be lifelong anticoagulation  · Wean O2 as tolerated, home O2 eval performed today   · Still pending PT OT evaluation  Patient and daughter are open for post acute rehab      Acute respiratory failure with hypoxia (HCC)  Assessment & Plan  · Patient w/ low O2 saturations on arrival in the ED-- 82% on RA  · In setting of acute PE  · Requiring 4-5L O2 at this time to maintain saturations 88-90%  · Wean O2 as tolerated  · See plan above     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  Assessment & Plan  · Known to patient and family; had PET scan that showed additional metabolic uptake in breast as well  · Per review of OP records-- family has chosen to not disclose this to the patient, and no further investigation/management is wanted      Essential hypertension  Assessment & Plan  · Continue Norvasc, Coreg, Cozaar, and Diazide    CKD (chronic kidney disease) stage 3, GFR 30-59 ml/min [facility])  Assessment & Plan  Lab Results   Component Value Date    EGFR 60 2022    EGFR 54 05/15/2022    EGFR 60 2022    CREATININE 0 85 2022    CREATININE 0 92 05/15/2022    CREATININE 0 84 2022   · Cr overall stable at baseline at 0 8-0 9  VTE Pharmacologic Prophylaxis: VTE Score: 11 High Risk (Score >/= 5) - Pharmacological DVT Prophylaxis Ordered: apixaban (Eliquis)  Sequential Compression Devices Ordered  Patient Centered Rounds: I performed bedside rounds with nursing staff today  Discussions with Specialists or Other Care Team Provider: Internal medicine team     Education and Discussions with Family / Patient: Updated  (daughter) via phone  Current Length of Stay: 3 day(s)  Current Patient Status: Inpatient   Discharge Plan: Anticipate discharge in 24-48 hrs to home or rehab pending PT/OT evaluation    Code Status: Level 1 - Full Code    Subjective:   Events overnight  Patient states she had a bowel movement 2 days ago  Patient has no other complaints this morning  Patient denies fever, chills, chest pain, shortness a breath, nausea, vomiting, diarrhea, constipation  Objective:     Vitals:   Temp (24hrs), Av 8 °F (36 6 °C), Min:97 7 °F (36 5 °C), Max:97 8 °F (36 6 °C)    Temp:  [97 7 °F (36 5 °C)-97 8 °F (36 6 °C)] 97 8 °F (36 6 °C)  HR:  [45-72] 60  Resp:  [18-20] 20  BP: (115-137)/(67-90) 131/90  SpO2:  [91 %-93 %] 91 %  Body mass index is 26 03 kg/m²  Input and Output Summary (last 24 hours):   No intake or output data in the 24 hours ending 22 1401    Physical Exam:   Physical Exam  Vitals and nursing note reviewed  Constitutional:       General: She is not in acute distress  Appearance: She is well-developed  She is not ill-appearing  HENT:      Head: Normocephalic and atraumatic  Eyes:      Conjunctiva/sclera: Conjunctivae normal    Cardiovascular:      Rate and Rhythm: Normal rate and regular rhythm  Heart sounds: No murmur heard    Pulmonary:      Effort: Pulmonary effort is normal  No respiratory distress  Breath sounds: Normal breath sounds  Abdominal:      Palpations: Abdomen is soft  Tenderness: There is no abdominal tenderness  Musculoskeletal:      Cervical back: Neck supple  Right lower leg: No edema  Left lower leg: No edema  Skin:     General: Skin is warm and dry  Neurological:      Mental Status: She is alert  Additional Data:     Labs:  Results from last 7 days   Lab Units 22  0450 05/15/22  1037   WBC Thousand/uL 10 80* 11 44*   HEMOGLOBIN g/dL 11 7 11 8   HEMATOCRIT % 38 2 38 1   PLATELETS Thousands/uL 393* 390   NEUTROS PCT %  --  73   LYMPHS PCT %  --  20   MONOS PCT %  --  5   EOS PCT %  --  1     Results from last 7 days   Lab Units 22  0450 22  1032 22  1818   SODIUM mmol/L 142   < > 141   POTASSIUM mmol/L 3 8   < > 4 3   CHLORIDE mmol/L 105   < > 107   CO2 mmol/L 31   < > 25   BUN mg/dL 29*   < > 36*   CREATININE mg/dL 0 85   < > 0 97   ANION GAP mmol/L 6   < > 9   CALCIUM mg/dL 9 3   < > 9 6   ALBUMIN g/dL  --   --  3 6   TOTAL BILIRUBIN mg/dL  --   --  0 59   ALK PHOS U/L  --   --  130*   ALT U/L  --   --  12   AST U/L  --   --  12*   GLUCOSE RANDOM mg/dL 90   < > 114    < > = values in this interval not displayed  Results from last 7 days   Lab Units 22  1032   INR  1 21*                   Lines/Drains:  Invasive Devices  Report    Peripheral Intravenous Line  Duration           Peripheral IV 22 Left Antecubital 3 days                  Telemetry:  Telemetry Orders (From admission, onward)             24 Hour Telemetry Monitoring  Continuous x 24 Hours (Telem)           References:    Telemetry Guidelines   Question:  Reason for 24 Hour Telemetry  Answer:  Pulmonary Embolism - 24 hours without resp   compromise, dysrhythmias, hemodynamically stable                 Telemetry Reviewed: PVCs  Indication for Continued Telemetry Use: PE             Imaging: No pertinent imaging reviewed  Recent Cultures (last 7 days):         Last 24 Hours Medication List:   Current Facility-Administered Medications   Medication Dose Route Frequency Provider Last Rate    acetaminophen  650 mg Oral Q6H Nancy Mora PA-C      ALPRAZolam  0 25 mg Oral TID PRN Nancy Mora PA-C      amLODIPine  5 mg Oral BID Nancy Mora PA-C      apixaban  10 mg Oral BID MD Hoda Ashwandy Swainhardik ON 5/25/2022] apixaban  5 mg Oral BID Faviola Aguilar MD      aspirin  81 mg Oral Daily Nancy Mora PA-C      carvedilol  12 5 mg Oral BID With Meals Nancy Mora PA-C      losartan  100 mg Oral Daily Nancy Mora PA-C      melatonin  6 mg Oral HS Nancy Mora PA-C      methimazole  5 mg Oral Once per day on Mon Wed Fri Nancy Mora PA-C      ondansetron  4 mg Intravenous Q6H PRN Nancy Mora PA-C      venlafaxine  150 mg Oral Daily Nancy Mora PA-C          Today, Patient Was Seen By: Su Pelaez MD    **Please Note: This note may have been constructed using a voice recognition system  **

## 2022-05-16 NOTE — TELEPHONE ENCOUNTER
Yuni called and stated that she wanted you to know that she took Trung Schumacher to the hospital on Friday she was admitted for SOB , she stated she will be moved to rehab to get her strength back for 2 weeks

## 2022-05-16 NOTE — RESPIRATORY THERAPY NOTE
Home Oxygen Qualifying Test     Patient name: Bertha Dutton        : 1930   Date of Test:  May 16, 2022  Diagnosis:    Home Oxygen Test:    **Medicare Guidelines require item(s) 1-5 on all ambulatory patients or 1 and 2 on non-ambulatory patients  1  Baseline SPO2 on Room Air at rest 83   %   a  If <= 88% on Room Air add O2 via NC to obtain SpO2 >=88%  If LPM needed, document LPM  needed to reach =>88%    2  SPO2 during exertion on Room Air  NA %  a  During exertion monitor SPO2  If SPO2 increases >=89%, do not add supplemental oxygen    3  SPO2 on Oxygen at Rest 92  % at 4  LPM    4  SPO2 during exertion on Oxygen 89 % at 4  LPM    5  Test performed during exertion activity  [x]  Supplemental Home Oxygen is indicated  []  Client does not qualify for home oxygen      Respiratory Additional Notes- pt qualifies for home 02 24hrs/day at 4lpm via nasal cannula  Pt also will need humidifier bottles  Connie Dukes

## 2022-05-17 ENCOUNTER — HOME HEALTH ADMISSION (OUTPATIENT)
Dept: HOME HEALTH SERVICES | Facility: HOME HEALTHCARE | Age: 87
End: 2022-05-17
Payer: MEDICARE

## 2022-05-17 VITALS
WEIGHT: 137.79 LBS | BODY MASS INDEX: 26.01 KG/M2 | DIASTOLIC BLOOD PRESSURE: 74 MMHG | HEART RATE: 58 BPM | TEMPERATURE: 98.1 F | SYSTOLIC BLOOD PRESSURE: 119 MMHG | RESPIRATION RATE: 16 BRPM | HEIGHT: 61 IN | OXYGEN SATURATION: 92 %

## 2022-05-17 LAB
ANION GAP SERPL CALCULATED.3IONS-SCNC: 5 MMOL/L (ref 4–13)
BUN SERPL-MCNC: 32 MG/DL (ref 5–25)
CALCIUM SERPL-MCNC: 9.1 MG/DL (ref 8.4–10.2)
CHLORIDE SERPL-SCNC: 106 MMOL/L (ref 96–108)
CO2 SERPL-SCNC: 30 MMOL/L (ref 21–32)
CREAT SERPL-MCNC: 0.88 MG/DL (ref 0.6–1.3)
DME PARACHUTE DELIVERY DATE ACTUAL: NORMAL
DME PARACHUTE DELIVERY DATE EXPECTED: NORMAL
DME PARACHUTE DELIVERY DATE REQUESTED: NORMAL
DME PARACHUTE ITEM DESCRIPTION: NORMAL
DME PARACHUTE ORDER STATUS: NORMAL
DME PARACHUTE SUPPLIER NAME: NORMAL
DME PARACHUTE SUPPLIER PHONE: NORMAL
ERYTHROCYTE [DISTWIDTH] IN BLOOD BY AUTOMATED COUNT: 16.5 % (ref 11.6–15.1)
GFR SERPL CREATININE-BSD FRML MDRD: 57 ML/MIN/1.73SQ M
GLUCOSE SERPL-MCNC: 87 MG/DL (ref 65–140)
HCT VFR BLD AUTO: 35.9 % (ref 34.8–46.1)
HGB BLD-MCNC: 10.9 G/DL (ref 11.5–15.4)
MCH RBC QN AUTO: 26.1 PG (ref 26.8–34.3)
MCHC RBC AUTO-ENTMCNC: 30.4 G/DL (ref 31.4–37.4)
MCV RBC AUTO: 86 FL (ref 82–98)
PLATELET # BLD AUTO: 358 THOUSANDS/UL (ref 149–390)
PMV BLD AUTO: 9.9 FL (ref 8.9–12.7)
POTASSIUM SERPL-SCNC: 4.1 MMOL/L (ref 3.5–5.3)
RBC # BLD AUTO: 4.17 MILLION/UL (ref 3.81–5.12)
SODIUM SERPL-SCNC: 141 MMOL/L (ref 135–147)
WBC # BLD AUTO: 10.83 THOUSAND/UL (ref 4.31–10.16)

## 2022-05-17 PROCEDURE — 80048 BASIC METABOLIC PNL TOTAL CA: CPT

## 2022-05-17 PROCEDURE — 97163 PT EVAL HIGH COMPLEX 45 MIN: CPT

## 2022-05-17 PROCEDURE — 97116 GAIT TRAINING THERAPY: CPT

## 2022-05-17 PROCEDURE — 85027 COMPLETE CBC AUTOMATED: CPT

## 2022-05-17 PROCEDURE — 97166 OT EVAL MOD COMPLEX 45 MIN: CPT

## 2022-05-17 PROCEDURE — 97535 SELF CARE MNGMENT TRAINING: CPT

## 2022-05-17 PROCEDURE — 99239 HOSP IP/OBS DSCHRG MGMT >30: CPT | Performed by: INTERNAL MEDICINE

## 2022-05-17 RX ADMIN — ACETAMINOPHEN 650 MG: 325 TABLET, FILM COATED ORAL at 09:30

## 2022-05-17 RX ADMIN — LOSARTAN POTASSIUM 100 MG: 50 TABLET, FILM COATED ORAL at 09:30

## 2022-05-17 RX ADMIN — AMLODIPINE BESYLATE 5 MG: 5 TABLET ORAL at 09:31

## 2022-05-17 RX ADMIN — CARVEDILOL 12.5 MG: 12.5 TABLET, FILM COATED ORAL at 17:03

## 2022-05-17 RX ADMIN — ASPIRIN 81 MG: 81 TABLET, COATED ORAL at 09:30

## 2022-05-17 RX ADMIN — VENLAFAXINE HYDROCHLORIDE 150 MG: 150 CAPSULE, EXTENDED RELEASE ORAL at 09:30

## 2022-05-17 RX ADMIN — APIXABAN 10 MG: 5 TABLET, FILM COATED ORAL at 17:02

## 2022-05-17 RX ADMIN — CARVEDILOL 12.5 MG: 12.5 TABLET, FILM COATED ORAL at 08:10

## 2022-05-17 RX ADMIN — APIXABAN 10 MG: 5 TABLET, FILM COATED ORAL at 09:31

## 2022-05-17 RX ADMIN — AMLODIPINE BESYLATE 5 MG: 5 TABLET ORAL at 17:03

## 2022-05-17 RX ADMIN — ACETAMINOPHEN 650 MG: 325 TABLET, FILM COATED ORAL at 17:03

## 2022-05-17 NOTE — ASSESSMENT & PLAN NOTE
Lab Results   Component Value Date    EGFR 57 05/17/2022    EGFR 60 05/16/2022    EGFR 54 05/15/2022    CREATININE 0 88 05/17/2022    CREATININE 0 85 05/16/2022    CREATININE 0 92 05/15/2022   · Cr overall stable at baseline at 0 8-0 9

## 2022-05-17 NOTE — PLAN OF CARE
Problem: PHYSICAL THERAPY ADULT  Goal: Performs mobility at highest level of function for planned discharge setting  See evaluation for individualized goals  Description: Treatment/Interventions: ADL retraining, LE strengthening/ROM, Functional transfer training, Therapeutic exercise, Elevations, Endurance training, Patient/family training, Gait training, Equipment eval/education, Bed mobility, Compensatory technique education, Continued evaluation, Spoke to case management, Spoke to nursing          See flowsheet documentation for full assessment, interventions and recommendations  Note: Prognosis: Fair  Problem List: Decreased endurance, Impaired balance, Decreased mobility, Impaired hearing  Assessment: Pt is a 80 y o  female who presented to ED 5/14/22 with c/o not feeling well  Dx:  Multiple subsegmental PE, acute respiratory failure with hypoxia  Comorbidities affecting pt's physical performance at time of assessment include: Lung Ca, advanced age, [place], macular degeneration  Personal factors affecting pt at time of IE include: [place], new O2 needs  PLOF and home set up listed above  Upon evaluation: Pt requires S for bed mobility, S for sit to stand, and S for ambulation with RW  Full objective findings from PT assessment regarding body systems outlined above  Current limitations include impaired balance, decreased endurance/activity tolerance, gait deviations  Pt's clinical presentation is currently unstable/unpredictable seen in pt's presentation of continuous monitoring in hospital, fall risk, and O2 needs  Pt would benefit from continued PT while in hospital and follow up with HHCPT at D/C to increase strength, balance, endurance, independence with funcitonal mobility to return to PLOF, maximize independence, decrease caregiver burden and improve quality of life  The patient's AM-PAC Basic Mobility Inpatient Short Form Raw Score is 20   A Raw score of greater than 17 suggests the patient may benefit from discharge to home with use of RW for all mobility;  Please also refer to the recommendation of the Physical Therapist for safe discharge planning  Barriers to Discharge: Decreased caregiver support        PT Discharge Recommendation: Home with home health rehabilitation (use of RW for all mobility)          See flowsheet documentation for full assessment

## 2022-05-17 NOTE — PLAN OF CARE
Problem: OCCUPATIONAL THERAPY ADULT  Goal: Performs self-care activities at highest level of function for planned discharge setting  See evaluation for individualized goals  Description:   Outcome: Progressing  Note: Limitation: Decreased ADL status, Decreased endurance, Decreased self-care trans, Decreased high-level ADLs  Prognosis: Good  Assessment: Pt is a 80 y o  female seen for OT evaluation at [address] [place] [address], admitted 5/13/2022 w/ Multiple subsegmental pulmonary emboli without acute cor pulmonale ([address] )  OT completed expanded review of pt's medical and social history  Comorbidities affecting pt's functional performance at time of assessment include: HTN, mass of L lung, HTN, anxiety, arthritis, ambulatory dysfunction  Personal factors affecting pt at time of IE include:steps to enter environment, difficulty performing ADLS and difficulty performing IADLS   Prior to admission, pt was living in [address], alone and was independent with light ADL/meal prep  Daughter visits daily and assists with showers and IADLs  Upon evaluation, pt presents to OT below baseline due to the following performance deficits: weakness, decreased strength, decreased balance and decreased tolerance  Pt to benefit from continued skilled OT tx while in the hospital to address deficits as defined above and maximize level of functional independence w ADL's and functional mobility  Occupational Performance areas to address include: grooming, bathing/shower, toilet hygiene, dressing, functional mobility and functional transfers, bed mobility  The patient's raw score on the AM-PAC Daily Activity inpatient short form is 21, standardized score is 44 27, greater than 39 4  Patients at this level are likely to benefit from DC to home  Based on findings, pt is of moderate complexity, due to medical status  At this time, OT recommendations at time of discharge are Home OT and family support       OT Discharge Recommendation: Home with home health rehabilitation

## 2022-05-17 NOTE — CASE MANAGEMENT
Case Management Assessment & Discharge Planning Note    Patient name Alfredo Yoon  Location S /S -01 MRN [id number]  : 1930 Date 2022       Current Admission Date: 2022  Current Admission Diagnosis:Multiple subsegmental pulmonary emboli without acute cor pulmonale 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 2022    Hyperparathyroidism ([address] ) 2022    Hypercalcemia 10/05/2021    Bilateral leg edema 10/05/2021    Mixed anxiety depressive disorder 2021    Ambulatory dysfunction 2021    Medicare annual wellness visit, initial 2021    Essential hypertension 2020    CKD (chronic kidney disease) stage 3, GFR 30-59 ml/min ([address] ) 2020    Hyperthyroidism 2020    Hypertensive urgency 2018    Anxiety 2018    Arthritis 2018    Hypokalemia 2018      LOS (days): 4  Geometric Mean LOS (GMLOS) (days): 4 10  Days to GMLOS:0 6     OBJECTIVE:    Risk of Unplanned Readmission Score: 19 65         Current admission status: Inpatient       Preferred Pharmacy:   [address] [place] [id number]  Phone: [phone] Fax: [phone]    Primary Care Provider: Juwan Ya MD    Primary Insurance: MEDICARE  Secondary Insurance: [facility]    ASSESSMENT:  Hazel Nuñez Proxies    There are no active Health Care Proxies on file                        Patient Information  Admitted from[de-identified] Home  Mental Status: Other (Comment) (working with PT; spoke with daughter, Aashish Damon, by phone)  During Assessment patient was accompanied by: Not accompanied during assessment  Assessment information provided by[de-identified] Daughter  Primary Caregiver: Family  Support Systems: Children, Friends/neighbors  Type of Current Residence: [facility] (with basement)  Living Arrangements: Lives Alone    Activities of Daily Living Prior to Admission  Functional Status: Independent  Completes ADLs independently?: No  Level of ADL dependence: Assistance  Ambulates independently?: Yes  Does patient use assisted devices?: Yes  Assisted Devices (DME) used: [employer]  Does patient currently own DME?: Yes  What DME does the patient currently own?: [employer]  Does patient have a history of Outpatient Therapy (PT/OT)?: No  Does patient have a history of HHC?: Yes  Does patient currently have [employer]?: No         Patient Information Continued  Does patient have prescription coverage?: Yes  Food insecurity resource given?: N/A  Does patient receive dialysis treatments?: No  Does patient have a history of substance abuse?: No  Does patient have a history of Mental Health Diagnosis?: No         Means of Transportation  Means of Transport to Appts[de-identified] Family transport  In the past 12 months, has lack of transportation kept you from medical appointments or from getting medications?: No  In the past 12 months, has lack of transportation kept you from meetings, work, or from getting things needed for daily living?: No  Was application for public transport provided?: N/A        DISCHARGE DETAILS:    Discharge planning discussed with[de-identified] patient's daughter, Parisa Biggs, by phone  Freedom of Choice: Yes (re: home care)  Comments - Freedom of Choice: no preference noted; reports she believes patient had [facility] in the past  CM contacted family/caregiver?: Yes (daughter, Parisa Biggs)  Were Treatment Team discharge recommendations reviewed with patient/caregiver?: Yes  Did patient/caregiver verbalize understanding of patient care needs?: Yes  Were patient/caregiver advised of the risks associated with not following Treatment Team discharge recommendations?: Yes    Contacts  Patient Contacts: Anna Forte, daughter  Relationship to Patient[de-identified] Family  Contact Method: Phone  Reason/Outcome: Continuity of Care, Emergency Contact, Referral, Discharge [address]         Is the patient interested in [facility] at discharge?: Yes  [address] requested[de-identified] Physical Therapy, Nursing, [address] [place] [address] Hank Provider[de-identified] PCP  Home Health Services Needed[de-identified] Evaluate Functional Status and Safety, Gait/ADL Training, Strengthening/Theraputic Exercises to Improve Function  Oxygen LPM Ordered (if applicable based on home health services needed):: 4 LPM  Homebound Criteria Met[de-identified] Requires the Assistance of Another Person for Safe Ambulation or to Leave the Home, Uses an Assist Device (i e  cane, walker, etc)  Supporting Clincal Findings[de-identified] Fatigues Easliy in [employer], Limited Endurance, Dyspnea with Exertion, Requires Oxygen    DME Referral Provided  Referral made for DME?: Yes  DME referral completed for the following items[de-identified] Home Oxygen concentrator, Portable Oxygen tanks  DME Supplier Name[de-identified] [employer]    Other Referral/Resources/Interventions Provided:  Interventions: HHC, DME  Referral Comments: Patient admitted due to pulmonary embolism  RT eval from yesterday recommending 4L o2 at discharge  PT working with patient at this time; anticipate recommendation for home care services  Call made to patient's daughter, Anna Forte, to complete assessment  Daughter states that patient lives alone, but is only actually alone overnight  Reports that she stays with patient during the day; lives nearby  Patient usually ambulates with a walker  Home is a single level with a basement, but patient remains on main floor  Daughter reports history of home care services arranged through PCP, but denies any current services   Daughter aware of home care recommendation and in agreement to same  Patient on Care at [facility] list; TT sent to Sydney Conley NP to see if patient will be approved for same; awaiting response  Spoke with daughter about home o2 need; agreeable for same to be ordered and reports she will be able to be at the home to coordinate delivery  Denies any preference for DME provider and confirmed referral can be sent to [facility]  Referral sent via [place]  Will follow-up on home o2 arrangements and home care services pending report from NP      Would you like to participate in our [facility] service program?  : No - Declined          Transport at Discharge : Family

## 2022-05-17 NOTE — DISCHARGE SUMMARY
[facility]  Discharge- Darylene Reams 8/5/1930, 80 y o  female MRN: [id number]  Unit/Bed#: S -01 Encounter: [id number]  Primary Care Provider: Mary Lewis MD   Date and time admitted to hospital: 5/13/2022  5:36 PM    * Multiple subsegmental pulmonary emboli without acute cor pulmonale ([address] )  Assessment & Plan  · Patient presented to the ED today after calling her daughter and telling her she wasn't feeling well  · Upon arrival, significant hypoxia-- stable in the high 80s to low 90s on 4-5L NC at this time  · CTA PE study: Acute pulmonary emboli in right upper, middle and lower lobe segmental branches  No evidence of acutely elevated right-sided pressure    · In setting of likely metastatic malignancy       · Daughter and patient is agreeable for Eliquis as outpatient, price check done and this was found to be $47 per month  · Given that the patient has lung mass, this will likely be lifelong anticoagulation  · Will be discharged with home oxygen   · PT recommending home with home health     Acute respiratory failure with hypoxia ([address] )  Assessment & Plan  · Patient w/ low O2 saturations on arrival in the ED-- 82% on RA  · In setting of acute PE   · Requiring 4L of O2 at this time, will discharge with home oxygen     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  Assessment & Plan  · Known to patient and family; had PET scan that showed additional metabolic uptake in breast as well  · Per review of OP records-- family has chosen to not disclose this to the patient, and no further investigation/management is wanted      Essential hypertension  Assessment & Plan  · Continue home Norvasc, Coreg, Cozaar, and Diazide on discharge     CKD (chronic kidney disease) stage 3, GFR 30-59 ml/min [facility])  Assessment & Plan  Lab Results   Component Value Date    EGFR 57 05/17/2022    EGFR 60 05/16/2022    EGFR 54 05/15/2022    CREATININE 0 88 05/17/2022    CREATININE 0 85 05/16/2022    CREATININE 0 92 05/15/2022   · Cr overall stable at baseline at 0 8-0 9  Medical Problems             Resolved Problems  Date Reviewed: 5/15/2022   None               Discharging Resident: Yani Robles MD  Discharging Attending: Tara Carpio MD  PCP: Vincenzo Cowden, MD  Admission Date:   Admission Orders (From admission, onward)     Ordered        05/13/22 2333  Inpatient Admission  Once                      Discharge Date: 05/17/22    Consultations During Hospital Stay:  · None    Procedures Performed:   · None     Significant Findings / Test Results:   · CTA 5/13/22: Demonstrated acute pulmonary emboli in right upper, middle and lower lobe segmental branches  No evidence of acutely elevated right-sided pressure  Mildly increased sized of left upper lobe mass  Stable lymphadenopathy  Small to moderate left and small right pleural effusions  Bibasilar atelectasis     Incidental Findings:   · None     Test Results Pending at Discharge (will require follow up): · None     Outpatient Tests Requested:  · None    Complications:  None    Reason for Admission: dyspnea on exertion    Hospital Course:   Frederik Runner is a 80 y o  female patient who originally presented to the hospital on 5/13/2022 due to pulmonary embolism  Her hospitalization her CTA demonstrated an acute pulmonary emboli in the right upper, middle and lower lobe segmental branches  Patient was started on therapeutic lovenox and was later transitioned to apixaban  Patient's imaging also showed a lung mass but the fmaily did not want that worked up  During hospitalization she did have increased oxygen requirements up to 6 L at one point  Patient currently saturating well on 4 L of oxygen  Patient had home evaluation done and will be discharging home with oxygen  Today patient is deemed medically stable for discharge      Please see above list of diagnoses and related plan for additional information       Condition at Discharge: stable    Discharge Day Visit / Exam:   Subjective:  No acute events overnight  Patient has no complaints this morning, she states she feels much better than when she initially came in  Vitals: Blood Pressure: 133/63 (05/17/22 0755)  Pulse: 59 (05/17/22 0755)  Temperature: 97 8 °F (36 6 °C) (05/17/22 0755)  Temp Source: Oral (05/17/22 0755)  Respirations: 16 (05/17/22 0755)  Height: 5' 1" (154 9 cm) (05/13/22 1739)  Weight - Scale: 62 5 kg (137 lb 12 6 oz) (05/13/22 1739)  SpO2: 91 % (05/17/22 0755)  Exam:   Physical Exam  Vitals and nursing note reviewed  Constitutional:       General: She is not in acute distress  Appearance: She is well-developed  HENT:      Head: Normocephalic and atraumatic  Eyes:      Conjunctiva/sclera: Conjunctivae normal    Cardiovascular:      Rate and Rhythm: Normal rate and regular rhythm  Heart sounds: No murmur heard  Pulmonary:      Effort: Pulmonary effort is normal  No respiratory distress  Breath sounds: Rales (mild bibasilar crackles) present  Abdominal:      General: There is no distension  Palpations: Abdomen is soft  Tenderness: There is no abdominal tenderness  There is no guarding  Musculoskeletal:      Cervical back: Neck supple  Right lower leg: Edema (1 pitting edema) present  Left lower leg: Edema (1+ pitting edema) present  Skin:     General: Skin is warm and dry  Neurological:      Mental Status: She is alert  Discussion with Family: Updated  (daughter) via phone  Discharge instructions/Information to patient and family:   See after visit summary for information provided to patient and family  Provisions for Follow-Up Care:  See after visit summary for information related to follow-up care and any pertinent home health orders  Disposition:   Home    Planned Readmission: None     Discharge Medications:  See after visit summary for reconciled discharge medications provided to patient and/or family  **Please Note: This note may have been constructed using a voice recognition system**

## 2022-05-17 NOTE — ASSESSMENT & PLAN NOTE
· Patient w/ low O2 saturations on arrival in the ED-- 82% on RA  · In setting of acute PE   · Requiring 4L of O2 at this time, will discharge with home oxygen

## 2022-05-17 NOTE — CASE MANAGEMENT
Case Management Discharge Planning Note    Patient name Josue BEASLEY /S -01 MRN [id number]  : 1930 Date 2022       Current Admission Date: 2022  Current Admission Diagnosis:Multiple subsegmental pulmonary emboli without acute cor pulmonale [facility])   Patient Active Problem List    Diagnosis Date Noted    Multiple subsegmental pulmonary emboli without acute cor pulmonale ([facility] ) 2022    Acute respiratory failure with hypoxia ([facility] ) 2022    Visit for suture removal 2022    Other pulmonary embolism without acute cor pulmonale, unspecified chronicity ([facility] ) 2022    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 2022    Hyperparathyroidism ([facility] ) 2022    Hypercalcemia 10/05/2021    Bilateral leg edema 10/05/2021    Mixed anxiety depressive disorder 2021    Ambulatory dysfunction 2021    Medicare annual wellness visit, initial 2021    Essential hypertension 2020    CKD (chronic kidney disease) stage 3, GFR 30-59 ml/min ([facility] ) 2020    Hyperthyroidism 2020    Hypertensive urgency 2018    Anxiety 2018    Arthritis 2018    Hypokalemia 2018      LOS (days): 4  Geometric Mean LOS (GMLOS) (days): 4 10  Days to GMLOS:0 4     OBJECTIVE:  Risk of Unplanned Readmission Score: 17 92         Current admission status: Inpatient   Preferred Pharmacy:   [address] [facility] [address]  Phone: [phone] Fax: [phone]    Primary Care Provider: Roosevlet Yanez MD    Primary Insurance: MEDICARE  Secondary Insurance: [facility]    DISCHARGE DETAILS:    Discharge planning discussed with[de-identified] patient's daughter, Anna Earing, and patient at bedside  [place] of Choice: Yes (re: home care)  Comments - [place] of Choice: [facility] accepted for care at home program  CM contacted family/caregiver?: Yes (daughter, Haydee Mejias Ladan Dunbar)  Were Treatment Team discharge recommendations reviewed with patient/caregiver?: Yes  Did patient/caregiver verbalize understanding of patient care needs?: Yes  Were patient/caregiver advised of the risks associated with not following Treatment Team discharge recommendations?: Yes    Contacts  Patient Contacts: Yesica Flood, daughter  Relationship to Patient[de-identified] Family  Contact Method: In Person  Reason/Outcome: Continuity of Care, Emergency Contact, Referral, Discharge [address]         Is the patient interested in [facility] at discharge?: Yes    DME Referral Provided  Referral made for DME?: Yes  DME referral completed for the following items[de-identified] Home Oxygen concentrator, Portable Oxygen tanks  DME Supplier Name[de-identified] [employer]    Other Referral/Resources/Interventions Provided:  Interventions: HHC, DME  Referral Comments: TT received from BETTY Zavala relaying patient approved for Care at [facility] program  Referral sent to Kevin REYES and patient accepted by them as well  Met with patient and daughter, Yesica Flood, at [facility] to discuss above  Both confirmed plan for d/c home with home care services through [address] [place] [address]  and relayed interested in Care at Home program  Meals on Wheels discussed and daughter confirmed she would like to try same  Reports that she would be best contact for MOW to arrange same  Referral for MOW sent via [place]  O2 order sent to [facility] and portable delivered to patient's room by liaison  Daughter reports that she has an appointment this afternoon, but will arrange for home o2 to be delivered and then will return to pick-up patient around 5:00pm  IMM reviewed and patient signed; copy provided for their records      Would you like to participate in our [address] service program?  : No - Declined    Treatment Team Recommendation: Home with [employer]  Discharge Destination Plan[de-identified] Home with [employer] ([address] at Home)  Transport at Discharge : Family                             IMM Given (Date):: 05/17/22  IMM Given to[de-identified] Patient  Family notified[de-identified] daughter, Samira Stewart                   Patient meets criteria for Care at Home services with Dr Daniel Casiano acceptance  Notification to [facility]  Patient accepted initial MOW service with Care at Home program  Referral placed via [facility]

## 2022-05-17 NOTE — OCCUPATIONAL THERAPY NOTE
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    Acute respiratory failure with hypoxia (HCC)      Past Medical History:   Diagnosis Date    Allergic     Anxiety     Arthritis     left knee    Cancer ([address] )     skin: face, nose and back (100+ sutures)    Chronic kidney disease     stage 2    Coronary artery disease     Depression     Disease of thyroid gland     Edema     [place]     Generalized headaches     [place]     GERD (gastroesophageal reflux disease)     [place]     Hiatal hernia     History of echocardiogram 02/2016    Stress Echo 3/15/16- Normal stress echo with no echocardiographic evidence of myocardial infraction or myocardial ischemia  This was technically difficult study  Echo 2/9/2016- Mild LVH  EF of 50-55%  LV diastolic noncompliance  Mitral annular calcification with 2+ MR  Normal opening of the cardiac valves    History of EKG 10/20/2017     Normal sinus rhythm, possible left atrial enlargement  Borderline ECG  EKG 7/28/17- Sinus rhytm with 1st degree AV block  Cannot rule out inferior infarct, age undetermined  Abnormal ECG  EKG 1/13/17- Normal sinus rhythm, nonspecific T wave abnormality  Abnormal ECG  EKG 2/26/16- Sinus rhythm with 1st degree AV block  Possible left atrial enlargement  Cannot rule out inferior infarct, [facility] of Holter monitoring 12/22/2015    The baseline rhythm was of sinus origin with an average rate of 72bpm, (range: 59-98bpm)  There were frequant single uniform PVCs (averaging over 1600 beats per hour)  There were a few episodes of asymptomatic sinus arrhythmia with sinus bradycardia  There were a few PACs (averaging 8 4 beats per hour)   Aside from sinus arrhythmia, there were no other sustained cardiac dysrhythmias   History of stress test 03/15/2016     Negative EKG part of stress echo at 100% age predicted maximum heart rate  Frequant PVC's in the pattern of ventricular bigeminy at rest and in recovery with significant suppression during exercise  No evidence of complex dysrhythmias  Resting hypertension with normal blood pressure response to exercise  Exaggreated heart rate response to exercise  Cardiolite 1/8/16- Moderately abnormal strudy wi    [place] (hard of hearing)     bilat HA    Hypertension     Macular degeneration     Murmur, heart     Osteoporosis     Per [place]     Subclinical hyperthyroidism     Thyroid nodule     right thyroid nodule (neg biopsy, pe post) -Per [place]     Vision problems     mac degenaration- Per [place]        Past Surgical History:   Procedure Laterality Date    APPENDECTOMY      age 15   [de-identified] EYE SURGERY Left     Cataract with IOL    JOINT REPLACEMENT Left 2010    knee    IN XCAPSL CTRC RMVL INSJ IO LENS PROSTH W/O ECP Right 8/8/2016    Procedure: EXTRACTION EXTRACAPSULAR CATARACT PHACO INTRAOCULAR LENS (IOL); Surgeon: Anila Edmond MD;  Location: [facility] MAIN OR;  Service: Ophthalmology    RESECTION TONSIL RADICAL      SKIN BIOPSY      face, back, right leg    SPLENECTOMY, TOTAL          05/17/22 0945   OT Last Visit   OT Visit Date 05/17/22   Note Type   Note type Evaluation   Restrictions/Precautions   Weight Bearing Precautions Per Order No   Other Precautions Hard of hearing;Cognitive; Bed Alarm; Chair Alarm   Pain Assessment   Pain Assessment Tool 0-10   Pain Score No Pain   Home Living   Type of [address] [place] [address] One level  (3STE (b/l railing))   Bathroom Shower/Tub Walk-in shower   Bathroom Toilet Standard   Bathroom Equipment Shower chair;Grab bars in shower   Home Equipment Walker;Cane  (Primarily uses RW)   Prior Function   Level of [place] Independent with ADLs and functional mobility; Needs assistance with IADLs   Lives With Alone   Receives Help From Family  (daughter does all IADLs)   ADL Assistance Needs assistance  (daughter assist with shower)   IADLs Needs assistance   Falls in the last 6 months 0   ADL   Eating Assistance 7  Independent   Grooming Assistance 5  Supervision/Setup   UB Bathing Assistance 5  Supervision/Setup   LB Bathing Assistance 5  Supervision/Setup   UB Dressing Assistance 5  Supervision/Setup   LB Dressing Assistance 5  Supervision/Setup   LB Dressing Deficit Verbal cueing   Toileting Assistance  5  Supervision/Setup   Toileting Deficit Verbal cueing   Bed Mobility   Supine to Sit 5  Supervision   Additional items Increased time required   Transfers   Sit to Stand 5  Supervision   Additional items Increased time required   Stand to Sit 5  Supervision   Additional items Increased time required   Stand pivot 5  Supervision  (RW)   Toilet transfer 5  Supervision   Additional items Increased time required   Functional Mobility   Functional Mobility 5  Supervision   Additional Comments RW; Edu on O2 line mgmt   Additional items Rolling walker   Activity Tolerance   Activity Tolerance Patient tolerated treatment well   Nurse Made Aware PREETI GARCIA Assessment   RUE Assessment WFL   LUE Assessment   LUE Assessment WFL   Cognition   Overall Cognitive Status WFL   Arousal/Participation Alert; Cooperative   Attention Within functional limits   Orientation Level Oriented X4   Memory Within functional limits   Following Commands Follows one step commands without difficulty   Assessment   Limitation Decreased ADL status; Decreased endurance;Decreased self-care trans;Decreased high-level ADLs   Prognosis Good   Assessment Pt is a 80 y o  female seen for OT evaluation at [address] [place] [address], admitted 5/13/2022 w/ Multiple subsegmental pulmonary emboli without acute cor pulmonale ([facility] )  OT completed expanded review of pt's medical and social history   Comorbidities affecting pt's functional performance at time of assessment include: HTN, mass of L lung, HTN, anxiety, arthritis, ambulatory dysfunction  Personal factors affecting pt at time of IE include:steps to enter environment, difficulty performing ADLS and difficulty performing IADLS   Prior to admission, pt was living in [address], alone and was independent with light ADL/meal prep  Daughter visits daily and assists with showers and IADLs  Upon evaluation, pt presents to OT below baseline due to the following performance deficits: weakness, decreased strength, decreased balance and decreased tolerance  Pt to benefit from continued skilled OT tx while in the hospital to address deficits as defined above and maximize level of functional independence w ADL's and functional mobility  Occupational Performance areas to address include: grooming, bathing/shower, toilet hygiene, dressing, functional mobility and functional transfers, bed mobility  The patient's raw score on the AM-PAC Daily Activity inpatient short form is 21, standardized score is 44 27, greater than 39 4  Patients at this level are likely to benefit from DC to home  Based on findings, pt is of moderate complexity, due to medical status  At this time, OT recommendations at time of discharge are Home OT and family support  Goals   Patient Goals go home   Plan   Treatment Interventions ADL retraining;Functional transfer training;UE strengthening/ROM; Endurance training;Patient/family training;Equipment evaluation/education; Compensatory technique education;Continued evaluation; Energy conservation   Goal Expiration Date 05/27/22   OT Frequency 2-3x/wk   Additional Treatment Session   Treatment Assessment Patient participated in Skilled OT session this date with interventions consisting of ADL re training with the use of correct body mechnaics, Energy Conservation techniques, safety awareness and fall prevention techniques and  therapeutic activities to: increase activity tolerance   Patient agreeable to OT treatment session, upon arrival patient was found supine in bed  Treatment session as follows: Pt sat up at EOB with supervision  Used RW to ambulate into bathroom for toileting  Pt able to change brief and perform cherry hygiene with supervision and VCs  Grooming at sink with supervision  Occasional verbal cues throughout for O2 line mgmt  Patient continues to be functioning below baseline level, occupational performance remains limited secondary to factors listed above and increased risk for falls and injury  The patient's raw score on the AM-PAC Daily Activity inpatient short form is 21, standardized score is 44 27, greater than 39 4  Patients at this level are likely to benefit from DC to home  From OT standpoint, recommendation at time of d/c would be Home with family support and Home OT  Patient to benefit from continued Occupational Therapy treatment while in the hospital to address deficits as defined above and maximize level of functional independence with ADLs and functional mobility    Pt left with call bell in reach, tray table in reach, needs met, chair alarm activated  Recommendation   OT Discharge Recommendation Home with home health rehabilitation   [facility] Daily Activity Inpatient   Lower Body Dressing 3   Bathing 3   Toileting 3   Upper Body Dressing 4   Grooming 4   Eating 4   Daily Activity Raw Score 21   Daily Activity Standardized Score (Calc for Raw Score >=11) 44 27     Pt will achieve the following goals within 10 days  *Pt will complete grooming with independence  *Pt will complete UB/LB bathing with supervision  *Pt will complete UB/LB dressing with modified independence      *Pt will complete toileting (hygiene and clothing management) with independence    *Pt will complete bed mobility with independence, with bed flat and no side rail to prep for purposeful tasks    *Pt will perform functional transfers with modified independence in order to complete ADL routine  *Pt will increase standing tolerance to 5+ minutes in order to complete ADL/IADL routine  *Pt will complete item retrieval with supervision while demonstrating good safety, managing O2 line  *Pt will demonstrate increased activity tolerance in order to complete ADL routine  *Pt will identify 3-5 fall risks to ensure safety upon discharge      [employer], MS, OTR/L

## 2022-05-17 NOTE — DISCHARGE INSTR - AVS FIRST PAGE
Dear Cristin Acevedo,     It was our pleasure to care for you here at [address]  It is our hope that we were always able to exceed the expected standards for your care during your stay  You were hospitalized due to pulmonary embolism  You were cared for on the 3rd floor by Talya Contreras MD under the service of Loretta Alba MD with the [facility] who covers for your primary care physician (PCP), Angie Mckeon MD, while you were hospitalized  If you have any questions or concerns related to this hospitalization, you may contact us at [phone]  For follow up as well as any medication refills, we recommend that you follow up with your primary care physician  A registered nurse will reach out to you by phone within a few days after your discharge to answer any additional questions that you may have after going home  However, at this time we provide for you here, the most important instructions / recommendations at discharge:     Notable Medication Adjustments -   Please take Eliquis 5 mg twice a day   Testing Required after Discharge -   None  Important follow up information -   Please follow up with your primary care provider in one week   Please review this entire after visit summary as additional general instructions including medication list, appointments, activity, diet, any pertinent wound care, and other additional recommendations from your care team that may be provided for you        Sincerely,     Talya Contreras MD

## 2022-05-17 NOTE — ASSESSMENT & PLAN NOTE
· Patient presented to the ED today after calling her daughter and telling her she wasn't feeling well  · Upon arrival, significant hypoxia-- stable in the high 80s to low 90s on 4-5L NC at this time  · CTA PE study: Acute pulmonary emboli in right upper, middle and lower lobe segmental branches  No evidence of acutely elevated right-sided pressure    · In setting of likely metastatic malignancy       · Daughter and patient is agreeable for Eliquis as outpatient, price check done and this was found to be $47 per month  · Given that the patient has lung mass, this will likely be lifelong anticoagulation  · Will be discharged with home oxygen   · PT recommending home with home health

## 2022-05-17 NOTE — PHYSICAL THERAPY NOTE
PHYSICAL THERAPY Evaluation    Performed at least 2 patient identifiers during session:  Patient Active Problem List   Diagnosis    Hypertensive urgency    Anxiety    Arthritis    Hypokalemia    Essential hypertension    CKD (chronic kidney disease) stage 3, GFR 30-59 ml/min ([address] )    Hyperthyroidism    Medicare annual wellness visit, initial    Mixed anxiety depressive disorder    Ambulatory dysfunction    Hypercalcemia    Bilateral leg edema    Hyperparathyroidism ([address] )    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    Visit for suture removal    Other pulmonary embolism without acute cor pulmonale, unspecified chronicity (HCC)    Multiple subsegmental pulmonary emboli without acute cor pulmonale (HCC)    Acute respiratory failure with hypoxia (HCC)       Past Medical History:   Diagnosis Date    Allergic     Anxiety     Arthritis     left knee    Cancer (HCC)     skin: face, nose and back (100+ sutures)    Chronic kidney disease     stage 2    Coronary artery disease     Depression     Disease of thyroid gland     Edema     Per [place]     Generalized headaches     [place]     GERD (gastroesophageal reflux disease)     Per [place]     Hiatal hernia     History of echocardiogram 02/2016    Stress Echo 3/15/16- Normal stress echo with no echocardiographic evidence of myocardial infraction or myocardial ischemia  This was technically difficult study  Echo 2/9/2016- Mild LVH  EF of 50-55%  LV diastolic noncompliance  Mitral annular calcification with 2+ MR  Normal opening of the cardiac valves    History of EKG 10/20/2017     Normal sinus rhythm, possible left atrial enlargement  Borderline ECG  EKG 7/28/17- Sinus rhytm with 1st degree AV block  Cannot rule out inferior infarct, age undetermined  Abnormal ECG  EKG 1/13/17- Normal sinus rhythm, nonspecific T wave abnormality  Abnormal ECG   EKG 2/26/16- Sinus rhythm with 1st degree AV block  Possible left atrial enlargement  Cannot rule out inferior infarct, age Jordon Wooten History of Holter monitoring 12/22/2015    The baseline rhythm was of sinus origin with an average rate of 72bpm, (range: 59-98bpm)  There were frequant single uniform PVCs (averaging over 1600 beats per hour)  There were a few episodes of asymptomatic sinus arrhythmia with sinus bradycardia  There were a few PACs (averaging 8 4 beats per hour)  Aside from sinus arrhythmia, there were no other sustained cardiac dysrhythmias   History of stress test 03/15/2016     Negative EKG part of stress echo at 100% age predicted maximum heart rate  Frequant PVC's in the pattern of ventricular bigeminy at rest and in recovery with significant suppression during exercise  No evidence of complex dysrhythmias  Resting hypertension with normal blood pressure response to exercise  Exaggreated heart rate response to exercise  Cardiolite 1/8/16- Moderately abnormal strudy wi    [place] (hard of hearing)     bilat HA    Hypertension     Macular degeneration     Murmur, heart     Osteoporosis     Per [place]     Subclinical hyperthyroidism     Thyroid nodule     right thyroid nodule (neg biopsy, pe post) -Per Alfredo Alpha     Vision problems     mac degenaration- Per Alfredo Alpha        Past Surgical History:   Procedure Laterality Date    APPENDECTOMY      age 15   [facility] EYE SURGERY Left     Cataract with IOL    JOINT REPLACEMENT Left 2010    knee    MT XCAPSL CTRC RMVL INSJ IO LENS PROSTH W/O ECP Right 8/8/2016    Procedure: EXTRACTION EXTRACAPSULAR CATARACT PHACO INTRAOCULAR LENS (IOL);   Surgeon: Cony Leone MD;  Location: [facility] MAIN OR;  Service: Ophthalmology    RESECTION TONSIL RADICAL      SKIN BIOPSY      face, back, right leg    SPLENECTOMY, TOTAL          05/17/22 1005   PT Last Visit   PT Visit Date 05/17/22   Note Type   Note type Evaluation   Pain Assessment   Pain Assessment Tool 0-10   Pain Score No Pain Restrictions/Precautions   Weight Bearing Precautions Per Order No   Other Precautions O2;Bed Alarm; Chair Alarm;Hard of hearing   Home Living   Type of [address] One level  (3STE (b/l railing))   Bathroom Shower/Tub Walk-in shower   Bathroom Toilet Standard   Bathroom Equipment Shower chair;Grab bars in [facility] Walker;Cane   Additional Comments pt primarily uses RW   Prior Function   Level of [place] Independent with ADLs and functional mobility   Lives With Alone   Receives Help From Family  (daughter does all IADLs)   ADL Assistance Needs assistance  (daughter assist with shower)   IADLs Needs assistance  (Dtr does driving, shopping, bigger cooking, cleaning;  (pt able to do light meal prep-cereal and juice and meds))   Falls in the last 6 months 0   General   Additional Pertinent History Pt on 4 L O2 via nasal cannula   Family/Caregiver Present No   Cognition   Overall Cognitive Status WFL   Arousal/Participation Alert   Orientation Level Oriented X4   Memory Within functional limits   Following Commands Follows one step commands without difficulty   RUE Assessment   RUE Assessment WFL   LUE Assessment   LUE Assessment WFL   RLE Assessment   RLE Assessment WFL  (Pt states chronic R knee issues)   LLE Assessment   LLE Assessment WFL   Bed Mobility   Supine to Sit 5  Supervision   Additional items Increased time required   Additional Comments pt remains seated OOB in chair at end of session   Transfers   Sit to Stand 5  Supervision   Additional items Increased time required   Stand to Sit 5  Supervision   Additional items Increased time required   Toilet transfer 5  Supervision   Additional items Increased time required   Ambulation/Elevation   Gait pattern Forward Flexion;Decreased foot clearance; Excessively slow   Gait Assistance 5  Supervision   Additional items Assist x 1   Assistive Device Rolling walker   Distance 50ft with RW, no LOB or unsteadiness, PT managing O2 tubing;  then pt able to ambulate 65ft with RW and pt managing O2 tubing, min VC for O2 tubing awareness however once pt aware she was able to manage without cueing  Balance   Static Sitting Fair +   Dynamic Sitting Fair +   Static Standing Fair   Dynamic Standing Fair -  (RW)   Ambulatory Fair -  (RW)   Activity Tolerance   Activity Tolerance   (no adverse effects to PT noted)   Nurse Made Aware [place]   Assessment   Prognosis Fair   Problem List Decreased endurance; Impaired balance;Decreased mobility; Impaired hearing   Assessment Pt is a 80 y o  female who presented to ED 5/14/22 with c/o not feeling well  Dx:  Multiple subsegmental PE, acute respiratory failure with hypoxia  Comorbidities affecting pt's physical performance at time of assessment include: Lung Ca, advanced age, [place], macular degeneration  Personal factors affecting pt at time of IE include: [place], new O2 needs  PLOF and home set up listed above  Upon evaluation: Pt requires S for bed mobility, S for sit to stand, and S for ambulation with RW  Full objective findings from PT assessment regarding body systems outlined above  Current limitations include impaired balance, decreased endurance/activity tolerance, gait deviations  Pt's clinical presentation is currently unstable/unpredictable seen in pt's presentation of continuous monitoring in hospital, fall risk, and O2 needs  Treatment consisted of ambulation while pt managing O2 tubing as stated above  Pt would benefit from continued PT while in hospital and follow up with HHCPT at D/C to increase strength, balance, endurance, independence with funcitonal mobility to return to PLOF, maximize independence, decrease caregiver burden and improve quality of life  The patient's AM-PAC Basic Mobility Inpatient Short Form Raw Score is 20   A Raw score of greater than 17 suggests the patient may benefit from discharge to home with use of RW for all mobility;  Please also refer to the recommendation of the Physical Therapist for safe discharge planning  Barriers to Discharge Decreased caregiver support   Goals   Patient Goals to go home   STG Expiration Date 05/31/22   Short Term Goal #1 Pt will be able to demo:  mod I sit to/from supine, mod I sit to/from standing with RW, mod I to ambulate 150ft with RW and able to manage O2 tubing without cueing, S to ascend/descend 3 steps with handrail;  to return to PLOF and home environment   Plan   Treatment/Interventions ADL retraining;LE strengthening/ROM; Functional transfer training; Therapeutic exercise;Elevations; Endurance training;Patient/family training;Gait training;Equipment eval/education; Bed mobility; Compensatory technique education;Continued evaluation;Spoke to case management;Spoke to nursing   PT Frequency 3-5x/wk   Recommendation   PT Discharge Recommendation Home with home health rehabilitation  (use of RW for all mobility)   AM-PAC Basic Mobility Inpatient   Turning in Bed Without Bedrails 4   Lying on Back to Sitting on Edge of Flat Bed 4   Moving Bed to Chair 3   Standing Up From Chair 3   Walk in Room 3   Climb 3-5 Stairs 3   Basic Mobility Inpatient Raw Score 20   Basic Mobility Standardized Score 43 99   Highest Level Of Mobility   JH-HLM Goal 6: Walk 10 steps or more   JH-HLM Achieved 7: Walk 25 feet or more       Polly Krishna, PT    Patient Name: Cristin Acevedo  VBTMC'X Date: 5/17/2022

## 2022-05-18 ENCOUNTER — TRANSITIONAL CARE MANAGEMENT (OUTPATIENT)
Dept: FAMILY MEDICINE CLINIC | Facility: CLINIC | Age: 87
End: 2022-05-18

## 2022-05-19 ENCOUNTER — HOME CARE VISIT (OUTPATIENT)
Dept: HOME HEALTH SERVICES | Facility: HOME HEALTHCARE | Age: 87
End: 2022-05-19
Payer: MEDICARE

## 2022-05-19 VITALS
HEART RATE: 73 BPM | RESPIRATION RATE: 18 BRPM | OXYGEN SATURATION: 94 % | BODY MASS INDEX: 25.86 KG/M2 | SYSTOLIC BLOOD PRESSURE: 106 MMHG | DIASTOLIC BLOOD PRESSURE: 60 MMHG | WEIGHT: 137 LBS | TEMPERATURE: 96.4 F | HEIGHT: 61 IN

## 2022-05-19 PROCEDURE — 400013 VN SOC

## 2022-05-19 PROCEDURE — G0299 HHS/HOSPICE OF RN EA 15 MIN: HCPCS

## 2022-05-19 PROCEDURE — 10330081 VN NO-PAY CLAIM PROCEDURE

## 2022-05-20 ENCOUNTER — HOME CARE VISIT (OUTPATIENT)
Dept: HOME HEALTH SERVICES | Facility: HOME HEALTHCARE | Age: 87
End: 2022-05-20
Payer: MEDICARE

## 2022-05-20 VITALS — HEART RATE: 73 BPM | SYSTOLIC BLOOD PRESSURE: 106 MMHG | OXYGEN SATURATION: 97 % | DIASTOLIC BLOOD PRESSURE: 67 MMHG

## 2022-05-20 PROCEDURE — G0152 HHCP-SERV OF OT,EA 15 MIN: HCPCS

## 2022-05-20 NOTE — CASE COMMUNICATION
Per OT evaluation pt will benefit from skilled OT services 2x for 1 week and 1x for 1 week with plan to focus on light meal prep, fall prevention, safety/O2 tubing mgmt, UE strengthening, and ADL retraining  Thank you

## 2022-05-20 NOTE — CASE COMMUNICATION
[facility] has admitted your patient to [address] [place] [address] service with the following disciplines:      SN PT OT  This report is informational only, no responses is needed  Primary focus of home health care PULMONARY  OXYGEN SAFETY  Patient stated goals of care To wean off oxygen and remain at home  Anticipated visit pattern and next visit date 1W1 2W3 9R1  Potential barriers to goal achievement lives alone but have supportive children  new to oxygen  tripping hazard due to long oxygen tubing      Thank you for allowing us to participate in the care of your patient  Detail Level: Detailed

## 2022-05-22 ENCOUNTER — HOME CARE VISIT (OUTPATIENT)
Dept: HOME HEALTH SERVICES | Facility: HOME HEALTHCARE | Age: 87
End: 2022-05-22
Payer: MEDICARE

## 2022-05-22 NOTE — CASE COMMUNICATION
FYI pt dtr is requesting that disciplines spread out days and do not come on same day as each other   OT to see pt for FU visit this Tuesday 5/24 at 2:30 PM

## 2022-05-23 ENCOUNTER — HOME CARE VISIT (OUTPATIENT)
Dept: HOME HEALTH SERVICES | Facility: HOME HEALTHCARE | Age: 87
End: 2022-05-23
Payer: MEDICARE

## 2022-05-23 VITALS
HEART RATE: 64 BPM | TEMPERATURE: 98.6 F | DIASTOLIC BLOOD PRESSURE: 62 MMHG | OXYGEN SATURATION: 98 % | SYSTOLIC BLOOD PRESSURE: 108 MMHG | RESPIRATION RATE: 20 BRPM

## 2022-05-23 PROCEDURE — G0299 HHS/HOSPICE OF RN EA 15 MIN: HCPCS

## 2022-05-24 ENCOUNTER — HOME CARE VISIT (OUTPATIENT)
Dept: HOME HEALTH SERVICES | Facility: HOME HEALTHCARE | Age: 87
End: 2022-05-24
Payer: MEDICARE

## 2022-05-24 VITALS — OXYGEN SATURATION: 98 % | SYSTOLIC BLOOD PRESSURE: 130 MMHG | HEART RATE: 70 BPM | DIASTOLIC BLOOD PRESSURE: 80 MMHG

## 2022-05-24 PROCEDURE — G0152 HHCP-SERV OF OT,EA 15 MIN: HCPCS

## 2022-05-25 ENCOUNTER — HOME CARE VISIT (OUTPATIENT)
Dept: HOME HEALTH SERVICES | Facility: HOME HEALTHCARE | Age: 87
End: 2022-05-25
Payer: MEDICARE

## 2022-05-25 PROCEDURE — G0151 HHCP-SERV OF PT,EA 15 MIN: HCPCS

## 2022-05-26 ENCOUNTER — HOME CARE VISIT (OUTPATIENT)
Dept: HOME HEALTH SERVICES | Facility: HOME HEALTHCARE | Age: 87
End: 2022-05-26
Payer: MEDICARE

## 2022-05-26 VITALS
TEMPERATURE: 97.5 F | SYSTOLIC BLOOD PRESSURE: 106 MMHG | OXYGEN SATURATION: 93 % | HEART RATE: 68 BPM | RESPIRATION RATE: 16 BRPM | DIASTOLIC BLOOD PRESSURE: 80 MMHG

## 2022-05-26 PROCEDURE — G0299 HHS/HOSPICE OF RN EA 15 MIN: HCPCS

## 2022-05-27 ENCOUNTER — HOME CARE VISIT (OUTPATIENT)
Dept: HOME HEALTH SERVICES | Facility: HOME HEALTHCARE | Age: 87
End: 2022-05-27
Payer: MEDICARE

## 2022-05-27 VITALS — HEART RATE: 42 BPM | SYSTOLIC BLOOD PRESSURE: 120 MMHG | DIASTOLIC BLOOD PRESSURE: 67 MMHG | OXYGEN SATURATION: 96 %

## 2022-05-27 PROCEDURE — G0152 HHCP-SERV OF OT,EA 15 MIN: HCPCS

## 2022-05-27 NOTE — CASE COMMUNICATION
FYI: During OT session pt noted with low radial HR of 42 at start of session and 38 at end of session despite pt drinking more water during OT session  Pt denies symptoms of dizziness or lightheadedness though does report some fatigue  O2 saturation remained 96 percent on 4L continuous O2 thorughout session and /67  OT encouraged pt to continue to drink water throughout the day and remain hydrated

## 2022-05-31 ENCOUNTER — OFFICE VISIT (OUTPATIENT)
Dept: FAMILY MEDICINE CLINIC | Facility: CLINIC | Age: 87
End: 2022-05-31
Payer: MEDICARE

## 2022-05-31 VITALS
SYSTOLIC BLOOD PRESSURE: 100 MMHG | HEART RATE: 46 BPM | OXYGEN SATURATION: 95 % | WEIGHT: 128.4 LBS | DIASTOLIC BLOOD PRESSURE: 52 MMHG | RESPIRATION RATE: 18 BRPM | HEIGHT: 61 IN | BODY MASS INDEX: 24.24 KG/M2 | TEMPERATURE: 97.3 F

## 2022-05-31 DIAGNOSIS — R00.1 BRADYCARDIA: ICD-10-CM

## 2022-05-31 DIAGNOSIS — J96.01 ACUTE RESPIRATORY FAILURE WITH HYPOXIA (HCC): ICD-10-CM

## 2022-05-31 DIAGNOSIS — I26.94 MULTIPLE SUBSEGMENTAL PULMONARY EMBOLI WITHOUT ACUTE COR PULMONALE (HCC): ICD-10-CM

## 2022-05-31 DIAGNOSIS — I26.99 OTHER PULMONARY EMBOLISM WITHOUT ACUTE COR PULMONALE, UNSPECIFIED CHRONICITY (HCC): ICD-10-CM

## 2022-05-31 DIAGNOSIS — I26.99 PULMONARY EMBOLISM (HCC): ICD-10-CM

## 2022-05-31 DIAGNOSIS — Z76.89 ENCOUNTER FOR SUPPORT AND COORDINATION OF TRANSITION OF CARE: Primary | ICD-10-CM

## 2022-05-31 PROCEDURE — 99495 TRANSJ CARE MGMT MOD F2F 14D: CPT | Performed by: INTERNAL MEDICINE

## 2022-05-31 RX ORDER — CARVEDILOL 3.12 MG/1
3.12 TABLET ORAL 2 TIMES DAILY WITH MEALS
Qty: 180 TABLET | Refills: 3 | Status: SHIPPED | OUTPATIENT
Start: 2022-05-31

## 2022-05-31 NOTE — ASSESSMENT & PLAN NOTE
Hospital record reviewed  Was diagnosed with PE  On Eliquis  Watch for bleeding  Has visiting nurse coming  Lung mass- stable   Appetite normal

## 2022-05-31 NOTE — PROGRESS NOTES
TCM Call (since 4/30/2022)     Date and time call was made  5/18/2022  4:01 PM    Hospital care reviewed  Records reviewed    Patient was hospitialized at  [facility]    Date of Admission  05/13/22    Date of discharge  05/17/22    Diagnosis  multiple subseqmental pulm emboli w/out cor pulm    Disposition  Home    Current Symptoms  None      TCM Call (since 4/30/2022)     Scheduled for follow up? Patient refused    I have advised the patient to call PCP with any new or worsening symptoms  [facility] [address]      Patient was admitted in the hospital with acute pulmonary embolism  She was also hypoxic  She now she is on continuous oxygen  Oxygen level is better  Started on Eliquis  Tolerating well  No bleeding  No black stool  Appetite normal   She is using a walker for ambulatory function  She has visiting nurse going to come  She denies any significant pain  No headache  No dizziness  No fever or chills  No diarrhea  Patient is alert awake and oriented  Comfortable  Lungs are clear to auscultation bilaterally  Heart rate-  bradycardic  No edema

## 2022-06-01 ENCOUNTER — HOME CARE VISIT (OUTPATIENT)
Dept: HOME HEALTH SERVICES | Facility: HOME HEALTHCARE | Age: 87
End: 2022-06-01
Payer: MEDICARE

## 2022-06-01 VITALS
DIASTOLIC BLOOD PRESSURE: 70 MMHG | OXYGEN SATURATION: 92 % | HEART RATE: 78 BPM | SYSTOLIC BLOOD PRESSURE: 132 MMHG | RESPIRATION RATE: 28 BRPM

## 2022-06-01 VITALS — DIASTOLIC BLOOD PRESSURE: 64 MMHG | HEART RATE: 42 BPM | OXYGEN SATURATION: 95 % | SYSTOLIC BLOOD PRESSURE: 121 MMHG

## 2022-06-01 PROCEDURE — G0152 HHCP-SERV OF OT,EA 15 MIN: HCPCS

## 2022-06-01 NOTE — CASE COMMUNICATION
OT to continue with pt additional 2wk2 starting week of 6/6/2022  Pt and pt dtr agreeable to POC and part of planning

## 2022-06-02 ENCOUNTER — HOME CARE VISIT (OUTPATIENT)
Dept: HOME HEALTH SERVICES | Facility: HOME HEALTHCARE | Age: 87
End: 2022-06-02
Payer: MEDICARE

## 2022-06-02 PROCEDURE — G0151 HHCP-SERV OF PT,EA 15 MIN: HCPCS

## 2022-06-02 NOTE — CASE COMMUNICATION
PT Visit  [address]  6/2  VS Rest with 4L O2 continuous - 118/60     20RR   [facility] 94PO2     40P   VS Amb with 4L O2 continuous - 136/68     70HW     92PO2     42P  with 2m 33s walk   No complaints of dizziness or lightheadedness  Very concerned re consistently low heart rates    Please advise

## 2022-06-03 ENCOUNTER — HOME CARE VISIT (OUTPATIENT)
Dept: HOME HEALTH SERVICES | Facility: HOME HEALTHCARE | Age: 87
End: 2022-06-03
Payer: MEDICARE

## 2022-06-03 VITALS
RESPIRATION RATE: 24 BRPM | SYSTOLIC BLOOD PRESSURE: 138 MMHG | OXYGEN SATURATION: 92 % | DIASTOLIC BLOOD PRESSURE: 70 MMHG | HEART RATE: 42 BPM

## 2022-06-03 VITALS
RESPIRATION RATE: 20 BRPM | HEART RATE: 52 BPM | DIASTOLIC BLOOD PRESSURE: 54 MMHG | SYSTOLIC BLOOD PRESSURE: 102 MMHG | OXYGEN SATURATION: 94 %

## 2022-06-03 PROCEDURE — G0151 HHCP-SERV OF PT,EA 15 MIN: HCPCS

## 2022-06-06 ENCOUNTER — HOME CARE VISIT (OUTPATIENT)
Dept: HOME HEALTH SERVICES | Facility: HOME HEALTHCARE | Age: 87
End: 2022-06-06
Payer: MEDICARE

## 2022-06-06 VITALS
OXYGEN SATURATION: 94 % | RESPIRATION RATE: 22 BRPM | DIASTOLIC BLOOD PRESSURE: 60 MMHG | HEART RATE: 48 BPM | SYSTOLIC BLOOD PRESSURE: 126 MMHG | TEMPERATURE: 97.8 F

## 2022-06-06 VITALS
DIASTOLIC BLOOD PRESSURE: 68 MMHG | OXYGEN SATURATION: 96 % | SYSTOLIC BLOOD PRESSURE: 138 MMHG | RESPIRATION RATE: 24 BRPM | HEART RATE: 46 BPM

## 2022-06-06 PROCEDURE — G0151 HHCP-SERV OF PT,EA 15 MIN: HCPCS

## 2022-06-06 PROCEDURE — G0299 HHS/HOSPICE OF RN EA 15 MIN: HCPCS

## 2022-06-08 ENCOUNTER — HOME CARE VISIT (OUTPATIENT)
Dept: HOME HEALTH SERVICES | Facility: HOME HEALTHCARE | Age: 87
End: 2022-06-08
Payer: MEDICARE

## 2022-06-08 VITALS
DIASTOLIC BLOOD PRESSURE: 70 MMHG | RESPIRATION RATE: 24 BRPM | SYSTOLIC BLOOD PRESSURE: 128 MMHG | HEART RATE: 50 BPM | OXYGEN SATURATION: 92 %

## 2022-06-08 VITALS — OXYGEN SATURATION: 94 % | HEART RATE: 58 BPM | DIASTOLIC BLOOD PRESSURE: 62 MMHG | SYSTOLIC BLOOD PRESSURE: 110 MMHG

## 2022-06-08 PROCEDURE — G0152 HHCP-SERV OF OT,EA 15 MIN: HCPCS

## 2022-06-08 PROCEDURE — G0151 HHCP-SERV OF PT,EA 15 MIN: HCPCS

## 2022-06-08 NOTE — CASE COMMUNICATION
Patient continues to have low heart rate of 40 consistently during OT sessions and per chart review during PT sessions as well  Please advise  I can be contacted at [phone] with any questions

## 2022-06-09 ENCOUNTER — HOME CARE VISIT (OUTPATIENT)
Dept: HOME HEALTH SERVICES | Facility: HOME HEALTHCARE | Age: 87
End: 2022-06-09
Payer: MEDICARE

## 2022-06-09 ENCOUNTER — TELEMEDICINE (OUTPATIENT)
Dept: GERIATRICS | Facility: OTHER | Age: 87
End: 2022-06-09
Payer: MEDICARE

## 2022-06-09 VITALS
RESPIRATION RATE: 20 BRPM | SYSTOLIC BLOOD PRESSURE: 110 MMHG | DIASTOLIC BLOOD PRESSURE: 60 MMHG | HEART RATE: 48 BPM | OXYGEN SATURATION: 95 % | TEMPERATURE: 97.7 F

## 2022-06-09 DIAGNOSIS — I26.94 MULTIPLE SUBSEGMENTAL PULMONARY EMBOLI WITHOUT ACUTE COR PULMONALE (HCC): ICD-10-CM

## 2022-06-09 DIAGNOSIS — I10 ESSENTIAL HYPERTENSION: ICD-10-CM

## 2022-06-09 DIAGNOSIS — R91.8 MASS OF UPPER LOBE OF LEFT LUNG: ICD-10-CM

## 2022-06-09 DIAGNOSIS — R00.1 BRADYCARDIA: ICD-10-CM

## 2022-06-09 DIAGNOSIS — R26.2 AMBULATORY DYSFUNCTION: Primary | ICD-10-CM

## 2022-06-09 PROCEDURE — 99344 HOME/RES VST NEW MOD MDM 60: CPT | Performed by: NURSE PRACTITIONER

## 2022-06-09 PROCEDURE — G0299 HHS/HOSPICE OF RN EA 15 MIN: HCPCS

## 2022-06-10 ENCOUNTER — HOME CARE VISIT (OUTPATIENT)
Dept: HOME HEALTH SERVICES | Facility: HOME HEALTHCARE | Age: 87
End: 2022-06-10
Payer: MEDICARE

## 2022-06-10 VITALS
DIASTOLIC BLOOD PRESSURE: 60 MMHG | HEART RATE: 48 BPM | TEMPERATURE: 97.7 F | OXYGEN SATURATION: 95 % | RESPIRATION RATE: 20 BRPM | SYSTOLIC BLOOD PRESSURE: 110 MMHG

## 2022-06-10 VITALS — OXYGEN SATURATION: 95 % | HEART RATE: 53 BPM | SYSTOLIC BLOOD PRESSURE: 112 MMHG | DIASTOLIC BLOOD PRESSURE: 60 MMHG

## 2022-06-10 PROCEDURE — G0152 HHCP-SERV OF OT,EA 15 MIN: HCPCS

## 2022-06-10 NOTE — PROGRESS NOTES
Virtual Regular Visit    Verification of patient location:    Patient is located in the following state in which I hold an active license [place]      Assessment/Plan:    Problem List Items Addressed This Visit        Cardiovascular and Mediastinum    Essential hypertension     · Controlled  · Continue with amlodipine, carvedilol, losartan  · Continue to monitor BP  · F/u with PCP as needed           Multiple subsegmental pulmonary emboli without acute cor pulmonale (HCC)     · At baseline  · Continue with eliquis for anticoagulation  · F/u with PCP as needed              Other    Ambulatory dysfunction - Primary     · Doing well at home  · PT/OT in home  · F/u with PCP as needed           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     · Recent hospitalization found to have mass on lung with metabolic uptake in breast  · Pt family chose not to disclose to pt that it is cancer and no further testing or treatments are wanted  · Continue to monitor for acute changes           Bradycardia     · Chronic bradycardia  · Continue to f/u with cardiology as needed  · VNA RN in home with PT/OT  · Monitor for acute changes                      Reason for visit is   Chief Complaint   Patient presents with    Virtual Regular Visit        Encounter provider Jose Thacker    Provider located at [address] [place] [address] [address] [place] [address]  [phone]      Recent Visits  Date Type Provider Dept   06/09/22 Telemedicine Fei Stroud [address] [place] [address] Street   Showing recent visits within past 7 days and meeting all other requirements  Future Appointments  No visits were found meeting these conditions  Showing future appointments within next 150 days and meeting all other requirements       The patient was identified by name and date of birth   Rachele Bae was informed that this is a telemedicine visit and that the visit is being conducted through [employer]aleo and patient was informed that this is a secure, HIPAA-compliant platform  She agrees to proceed     My office door was closed  No one else was in the room  She acknowledged consent and understanding of privacy and security of the video platform  The patient has agreed to participate and understands they can discontinue the visit at any time  Patient is aware this is a billable service  Subjective  Wenona Cooks is a 80 yr old female with PMH of CAD, anxiety, HTN, murmur who presented to the hospital r/t pulmonary embolism in right upper and middle and lower lobe segmental branches  Pt was given lovenox and then transitioned to apixaban  Imaging showed incidental finding of lung mass but family did not want a work up done  Pt started on O2 4L NC and discharged to home with VNA RN PT/OT  Past Medical History:   Diagnosis Date    Allergic     Anxiety     Arthritis     left knee    Cancer (HCC)     skin: face, nose and back (100+ sutures)    Chronic kidney disease     stage 2    Coronary artery disease     Depression     Disease of thyroid gland     Edema     Per [place]     Generalized headaches     Per [place]     GERD (gastroesophageal reflux disease)     [place]     Hiatal hernia     History of echocardiogram 02/2016    Stress Echo 3/15/16- Normal stress echo with no echocardiographic evidence of myocardial infraction or myocardial ischemia  This was technically difficult study  Echo 2/9/2016- Mild LVH  EF of 50-55%  LV diastolic noncompliance  Mitral annular calcification with 2+ MR  Normal opening of the cardiac valves    History of EKG 10/20/2017     Normal sinus rhythm, possible left atrial enlargement  Borderline ECG  EKG 7/28/17- Sinus rhytm with 1st degree AV block  Cannot rule out inferior infarct, age undetermined  Abnormal ECG  EKG 1/13/17- Normal sinus rhythm, nonspecific T wave abnormality  Abnormal ECG  EKG 2/26/16- Sinus rhythm with 1st degree AV block   Possible left atrial enlargement  Cannot rule out inferior infarct, age Won Baeza History of Holter monitoring 12/22/2015    The baseline rhythm was of sinus origin with an average rate of 72bpm, (range: 59-98bpm)  There were frequant single uniform PVCs (averaging over 1600 beats per hour)  There were a few episodes of asymptomatic sinus arrhythmia with sinus bradycardia  There were a few PACs (averaging 8 4 beats per hour)  Aside from sinus arrhythmia, there were no other sustained cardiac dysrhythmias   History of stress test 03/15/2016     Negative EKG part of stress echo at 100% age predicted maximum heart rate  Frequant PVC's in the pattern of ventricular bigeminy at rest and in recovery with significant suppression during exercise  No evidence of complex dysrhythmias  Resting hypertension with normal blood pressure response to exercise  Exaggreated heart rate response to exercise  Cardiolite 1/8/16- Moderately abnormal strudy wi    [place] (hard of hearing)     bilat HA    Hypertension     Macular degeneration     Murmur, heart     Osteoporosis     Per [place]     Subclinical hyperthyroidism     Thyroid nodule     right thyroid nodule (neg biopsy, pe post) -Per Freda Johnston     Vision problems     mac degenaration- Per Freda Johnston        Past Surgical History:   Procedure Laterality Date    APPENDECTOMY      age 15   [facility] SURG[facility]     Cataract with IOL    JOINT REPLACEMENT Left 2010    knee    [place] XCAPSL CTRC RMVL INSJ IO LENS PROSTH W/O ECP Right 8/8/2016    Procedure: EXTRACTION EXTRACAPSULAR CATARACT PHACO INTRAOCULAR LENS (IOL);   Surgeon: John Malagon MD;  Location: [facility] MAIN OR;  Service: Ophthalmology    RESECTION TONSIL RADICAL      SKIN BIOPSY      face, back, right leg    SPLENECTOMY, TOTAL         Current Outpatient Medications   Medication Sig Dispense Refill    acetaminophen (TYLENOL) 325 mg tablet Take 2 tablets by mouth every 6 (six) hours      ALPRAZolam (XANAX) 0 25 mg tablet Take 1 tablet (0 25 mg total) by mouth 3 (three) times a day as needed for anxiety 60 tablet 3    amLODIPine (NORVASC) 5 mg tablet Take 1 tablet (5 mg total) by mouth 2 (two) times a day 180 tablet 2    apixaban (ELIQUIS) 5 mg Take 2 tablets (10 mg total) by mouth in the morning and 2 tablets (10 mg total) in the evening  Do all this for 4 days  16 tablet 0    apixaban (ELIQUIS) 5 mg Take 1 tablet (5 mg total) by mouth 2 (two) times a day 60 tablet 12    aspirin 81 MG tablet Take 81 mg by mouth every morning   ASPIRIN 81 PO Take 1 tablet by mouth daily  Indications:    B Complex Vitamins (B-COMPLEX/B-12 PO) Take 1,500 mg by mouth 2 (two) times a day        carvedilol (Coreg) 3 125 mg tablet Take 1 tablet (3 125 mg total) by mouth 2 (two) times a day with meals 180 tablet 3    Cranberry 13780 MG CAPS Take 1 tablet by mouth 2 (two) times a day   losartan (COZAAR) 50 mg tablet take 2 tablets by mouth once daily 180 tablet 2    methimazole (TAPAZOLE) 5 mg tablet take 1 tablet by mouth once daily MONDAY TO FRIDAY SKIP ON SATURDAYS AND SUNDAYS (Patient taking differently: take 1 tablet by mouth once daily MONDAY Wednesday & FRIDAY SKIP ON SATURDAYS AND SUNDAYS ) 60 tablet 12    oxygen gas Inhale 4 L/min continuous  Indications: shortness of breath      triamterene-hydrochlorothiazide (DYAZIDE) 37 5-25 mg per capsule Take 1 capsule by mouth every morning PRN      venlafaxine (EFFEXOR-XR) 150 mg 24 hr capsule Take 150 mg by mouth daily PRN       No current facility-administered medications for this visit  Allergies   Allergen Reactions    Colchicine     Hydralazine Swelling     Cheeks swelled after IV Hydralazine    Tramadol        Review of Systems   Constitutional: Negative for chills and fever  HENT: Negative for ear pain and sore throat  Eyes: Negative for pain and visual disturbance  Respiratory: Positive for shortness of breath (on O2)  Negative for cough      Cardiovascular: Negative for chest pain and palpitations  Gastrointestinal: Negative for abdominal pain and vomiting  Genitourinary: Negative for dysuria and hematuria  Musculoskeletal: Positive for gait problem  Negative for arthralgias and back pain  Skin: Negative for color change and rash  Neurological: Positive for weakness  Negative for seizures and syncope  All other systems reviewed and are negative  Video Exam    Vitals:    06/10/22 1337   BP: 110/60   Pulse: (!) 48   Resp: 20   Temp: 97 7 °F (36 5 °C)   SpO2: 95%       Physical Exam  Vitals reviewed  Constitutional:       Appearance: She is well-developed  HENT:      Head: Normocephalic and atraumatic  Eyes:      Conjunctiva/sclera: Conjunctivae normal    Cardiovascular:      Rate and Rhythm: Normal rate and regular rhythm  Heart sounds: Normal heart sounds, S1 normal and S2 normal  No murmur heard  Pulmonary:      Effort: Pulmonary effort is normal  No respiratory distress  Breath sounds: Normal breath sounds  No wheezing  Abdominal:      General: Bowel sounds are normal  There is no distension  Palpations: Abdomen is soft  Tenderness: There is no abdominal tenderness  Musculoskeletal:         General: Normal range of motion  Cervical back: Normal range of motion  Comments: Generalized weakness   Skin:     General: Skin is warm and dry  Neurological:      Mental Status: She is alert  Psychiatric:         Attention and Perception: Attention normal          Mood and Affect: Mood normal          Speech: Speech normal          Behavior: Behavior normal          Thought Content: Thought content normal          Cognition and Memory: Cognition normal           VIRTUAL VISIT DISCLAIMER      Matthew Echevarria verbally agrees to participate in [employer]   Pt is aware that [employer] could be limited without vital signs or the ability to perform a full hands-on physical Korina Mom understands she or the provider may request at any time to terminate the video visit and request the patient to seek care or treatment in person

## 2022-06-10 NOTE — ASSESSMENT & PLAN NOTE
· Controlled  · Continue with amlodipine, carvedilol, losartan  · Continue to monitor BP  · F/u with PCP as needed

## 2022-06-10 NOTE — ASSESSMENT & PLAN NOTE
· Chronic bradycardia  · Continue to f/u with cardiology as needed  · VNA RN in home with PT/OT  · Monitor for acute changes

## 2022-06-10 NOTE — ASSESSMENT & PLAN NOTE
· Recent hospitalization found to have mass on lung with metabolic uptake in breast  · Pt family chose not to disclose to pt that it is cancer and no further testing or treatments are wanted  · Continue to monitor for acute changes

## 2022-06-13 ENCOUNTER — LAB REQUISITION (OUTPATIENT)
Dept: LAB | Facility: HOSPITAL | Age: 87
End: 2022-06-13
Payer: MEDICARE

## 2022-06-13 ENCOUNTER — HOME CARE VISIT (OUTPATIENT)
Dept: HOME HEALTH SERVICES | Facility: HOME HEALTHCARE | Age: 87
End: 2022-06-13
Payer: MEDICARE

## 2022-06-13 VITALS
RESPIRATION RATE: 22 BRPM | OXYGEN SATURATION: 94 % | HEART RATE: 62 BPM | SYSTOLIC BLOOD PRESSURE: 102 MMHG | DIASTOLIC BLOOD PRESSURE: 52 MMHG

## 2022-06-13 VITALS
RESPIRATION RATE: 18 BRPM | SYSTOLIC BLOOD PRESSURE: 112 MMHG | HEART RATE: 48 BPM | OXYGEN SATURATION: 99 % | TEMPERATURE: 97.8 F | DIASTOLIC BLOOD PRESSURE: 80 MMHG

## 2022-06-13 DIAGNOSIS — I26.99 OTHER PULMONARY EMBOLISM WITHOUT ACUTE COR PULMONALE (HCC): ICD-10-CM

## 2022-06-13 LAB
ANION GAP SERPL CALCULATED.3IONS-SCNC: 9 MMOL/L (ref 4–13)
BASOPHILS # BLD AUTO: 0.03 THOUSANDS/ΜL (ref 0–0.1)
BASOPHILS NFR BLD AUTO: 0 % (ref 0–1)
BUN SERPL-MCNC: 42 MG/DL (ref 5–25)
CALCIUM SERPL-MCNC: 9.8 MG/DL (ref 8.4–10.2)
CHLORIDE SERPL-SCNC: 106 MMOL/L (ref 96–108)
CO2 SERPL-SCNC: 25 MMOL/L (ref 21–32)
CREAT SERPL-MCNC: 0.99 MG/DL (ref 0.6–1.3)
EOSINOPHIL # BLD AUTO: 0.04 THOUSAND/ΜL (ref 0–0.61)
EOSINOPHIL NFR BLD AUTO: 0 % (ref 0–6)
ERYTHROCYTE [DISTWIDTH] IN BLOOD BY AUTOMATED COUNT: 17.9 % (ref 11.6–15.1)
GFR SERPL CREATININE-BSD FRML MDRD: 49 ML/MIN/1.73SQ M
GLUCOSE SERPL-MCNC: 113 MG/DL (ref 65–140)
HCT VFR BLD AUTO: 36.4 % (ref 34.8–46.1)
HGB BLD-MCNC: 11.4 G/DL (ref 11.5–15.4)
IMM GRANULOCYTES # BLD AUTO: 0.06 THOUSAND/UL (ref 0–0.2)
IMM GRANULOCYTES NFR BLD AUTO: 1 % (ref 0–2)
LYMPHOCYTES # BLD AUTO: 3.33 THOUSANDS/ΜL (ref 0.6–4.47)
LYMPHOCYTES NFR BLD AUTO: 30 % (ref 14–44)
MCH RBC QN AUTO: 26.8 PG (ref 26.8–34.3)
MCHC RBC AUTO-ENTMCNC: 31.3 G/DL (ref 31.4–37.4)
MCV RBC AUTO: 86 FL (ref 82–98)
MONOCYTES # BLD AUTO: 0.74 THOUSAND/ΜL (ref 0.17–1.22)
MONOCYTES NFR BLD AUTO: 7 % (ref 4–12)
NEUTROPHILS # BLD AUTO: 6.81 THOUSANDS/ΜL (ref 1.85–7.62)
NEUTS SEG NFR BLD AUTO: 62 % (ref 43–75)
NRBC BLD AUTO-RTO: 0 /100 WBCS
PLATELET # BLD AUTO: 362 THOUSANDS/UL (ref 149–390)
PMV BLD AUTO: 11.5 FL (ref 8.9–12.7)
POTASSIUM SERPL-SCNC: 4.5 MMOL/L (ref 3.5–5.3)
RBC # BLD AUTO: 4.25 MILLION/UL (ref 3.81–5.12)
SODIUM SERPL-SCNC: 140 MMOL/L (ref 135–147)
WBC # BLD AUTO: 11.01 THOUSAND/UL (ref 4.31–10.16)

## 2022-06-13 PROCEDURE — 85025 COMPLETE CBC W/AUTO DIFF WBC: CPT | Performed by: INTERNAL MEDICINE

## 2022-06-13 PROCEDURE — G0300 HHS/HOSPICE OF LPN EA 15 MIN: HCPCS

## 2022-06-13 PROCEDURE — 80048 BASIC METABOLIC PNL TOTAL CA: CPT | Performed by: INTERNAL MEDICINE

## 2022-06-13 PROCEDURE — G0151 HHCP-SERV OF PT,EA 15 MIN: HCPCS

## 2022-06-14 ENCOUNTER — TELEPHONE (OUTPATIENT)
Dept: FAMILY MEDICINE CLINIC | Facility: CLINIC | Age: 87
End: 2022-06-14

## 2022-06-14 NOTE — TELEPHONE ENCOUNTER
----- Message from Roney Eisenmenger, MD sent at 6/13/2022  4:44 PM EDT -----  Please call the patient regarding her abnormal result  Kidneys are little bit on the dry side  Drink more water if possible

## 2022-06-14 NOTE — TELEPHONE ENCOUNTER
CALLED AND LEFT MESSAGE ON SAYRA BANUELOS(DAUGHTER) VOICEMAIL RESULTS, QUESTIONS TO CALL OFFICE  Kemi Farah

## 2022-06-15 ENCOUNTER — HOME CARE VISIT (OUTPATIENT)
Dept: HOME HEALTH SERVICES | Facility: HOME HEALTHCARE | Age: 87
End: 2022-06-15
Payer: MEDICARE

## 2022-06-15 VITALS — SYSTOLIC BLOOD PRESSURE: 110 MMHG | HEART RATE: 48 BPM | DIASTOLIC BLOOD PRESSURE: 60 MMHG

## 2022-06-15 VITALS
SYSTOLIC BLOOD PRESSURE: 110 MMHG | DIASTOLIC BLOOD PRESSURE: 60 MMHG | HEART RATE: 48 BPM | OXYGEN SATURATION: 94 % | RESPIRATION RATE: 20 BRPM

## 2022-06-15 PROCEDURE — G0152 HHCP-SERV OF OT,EA 15 MIN: HCPCS

## 2022-06-15 PROCEDURE — G0151 HHCP-SERV OF PT,EA 15 MIN: HCPCS

## 2022-06-15 NOTE — CASE COMMUNICATION
Discharge home PT this date   Functional mobiity goals met  Advised pt NOT to ambualte onto front porch by herself due to difficulty manaing screen door and walker

## 2022-06-17 ENCOUNTER — HOME CARE VISIT (OUTPATIENT)
Dept: HOME HEALTH SERVICES | Facility: HOME HEALTHCARE | Age: 87
End: 2022-06-17
Payer: MEDICARE

## 2022-06-17 VITALS — DIASTOLIC BLOOD PRESSURE: 60 MMHG | SYSTOLIC BLOOD PRESSURE: 108 MMHG | HEART RATE: 47 BPM | OXYGEN SATURATION: 97 %

## 2022-06-17 PROCEDURE — G0152 HHCP-SERV OF OT,EA 15 MIN: HCPCS

## 2022-06-17 NOTE — ASSESSMENT & PLAN NOTE
BP upon presentation within appropriate parameters  Patient and daughter indicate that blood pressures are measured about 2x/week and are within normal limits: will continue current antihypertensive regimen and encourage to continue routine blood pressure and heart rate assessments at home  Labs (6/16) significant for K+ of 6 0: will hold potassium supplementation and /potassium containing products  Will obtain repeat BMP  DC instructions

## 2022-06-18 NOTE — CASE COMMUNICATION
Pt DC from skilled OT services thisdate with all goals met  This information is read only  No response required

## 2022-06-19 ENCOUNTER — HOSPITAL ENCOUNTER (EMERGENCY)
Facility: HOSPITAL | Age: 87
Discharge: HOME | End: 2022-06-20
Attending: EMERGENCY MEDICINE
Payer: MEDICARE

## 2022-06-19 VITALS
RESPIRATION RATE: 18 BRPM | TEMPERATURE: 98 F | SYSTOLIC BLOOD PRESSURE: 112 MMHG | DIASTOLIC BLOOD PRESSURE: 53 MMHG | HEART RATE: 46 BPM | OXYGEN SATURATION: 98 %

## 2022-06-19 DIAGNOSIS — R53.83 FATIGUE: Primary | ICD-10-CM

## 2022-06-19 LAB
ALBUMIN SERPL BCP-MCNC: 3.4 G/DL (ref 3.5–5)
ALP SERPL-CCNC: 88 U/L (ref 34–104)
ALT SERPL W P-5'-P-CCNC: 10 U/L (ref 7–52)
ANION GAP SERPL CALCULATED.3IONS-SCNC: 8 MMOL/L (ref 4–13)
AST SERPL W P-5'-P-CCNC: 11 U/L (ref 13–39)
BASOPHILS # BLD AUTO: 0.04 THOUSANDS/ΜL (ref 0–0.1)
BASOPHILS NFR BLD AUTO: 0 % (ref 0–1)
BILIRUB SERPL-MCNC: 0.73 MG/DL (ref 0.2–1)
BUN SERPL-MCNC: 59 MG/DL (ref 5–25)
CALCIUM ALBUM COR SERPL-MCNC: 10.7 MG/DL (ref 8.3–10.1)
CALCIUM SERPL-MCNC: 10.2 MG/DL (ref 8.4–10.2)
CARDIAC TROPONIN I PNL SERPL HS: 16 NG/L
CHLORIDE SERPL-SCNC: 107 MMOL/L (ref 96–108)
CO2 SERPL-SCNC: 23 MMOL/L (ref 21–32)
CREAT SERPL-MCNC: 1.51 MG/DL (ref 0.6–1.3)
EOSINOPHIL # BLD AUTO: 0.02 THOUSAND/ΜL (ref 0–0.61)
EOSINOPHIL NFR BLD AUTO: 0 % (ref 0–6)
ERYTHROCYTE [DISTWIDTH] IN BLOOD BY AUTOMATED COUNT: 18 % (ref 11.6–15.1)
GFR SERPL CREATININE-BSD FRML MDRD: 29 ML/MIN/1.73SQ M
GLUCOSE SERPL-MCNC: 128 MG/DL (ref 65–140)
GLUCOSE SERPL-MCNC: 162 MG/DL (ref 65–140)
HCT VFR BLD AUTO: 34.6 % (ref 34.8–46.1)
HGB BLD-MCNC: 10.7 G/DL (ref 11.5–15.4)
IMM GRANULOCYTES # BLD AUTO: 0.07 THOUSAND/UL (ref 0–0.2)
IMM GRANULOCYTES NFR BLD AUTO: 1 % (ref 0–2)
LYMPHOCYTES # BLD AUTO: 4.1 THOUSANDS/ΜL (ref 0.6–4.47)
LYMPHOCYTES NFR BLD AUTO: 31 % (ref 14–44)
MCH RBC QN AUTO: 26 PG (ref 26.8–34.3)
MCHC RBC AUTO-ENTMCNC: 30.9 G/DL (ref 31.4–37.4)
MCV RBC AUTO: 84 FL (ref 82–98)
MONOCYTES # BLD AUTO: 0.65 THOUSAND/ΜL (ref 0.17–1.22)
MONOCYTES NFR BLD AUTO: 5 % (ref 4–12)
NEUTROPHILS # BLD AUTO: 8.17 THOUSANDS/ΜL (ref 1.85–7.62)
NEUTS SEG NFR BLD AUTO: 63 % (ref 43–75)
NRBC BLD AUTO-RTO: 0 /100 WBCS
PLATELET # BLD AUTO: 360 THOUSANDS/UL (ref 149–390)
PMV BLD AUTO: 10.9 FL (ref 8.9–12.7)
POTASSIUM SERPL-SCNC: 5.5 MMOL/L (ref 3.5–5.3)
PROT SERPL-MCNC: 6.2 G/DL (ref 6.4–8.4)
RBC # BLD AUTO: 4.11 MILLION/UL (ref 3.81–5.12)
SODIUM SERPL-SCNC: 138 MMOL/L (ref 135–147)
WBC # BLD AUTO: 13.05 THOUSAND/UL (ref 4.31–10.16)

## 2022-06-19 PROCEDURE — 82948 REAGENT STRIP/BLOOD GLUCOSE: CPT

## 2022-06-19 PROCEDURE — 96360 HYDRATION IV INFUSION INIT: CPT

## 2022-06-19 PROCEDURE — 85025 COMPLETE CBC W/AUTO DIFF WBC: CPT

## 2022-06-19 PROCEDURE — 84484 ASSAY OF TROPONIN QUANT: CPT

## 2022-06-19 PROCEDURE — 80053 COMPREHEN METABOLIC PANEL: CPT

## 2022-06-19 PROCEDURE — 99284 EMERGENCY DEPT VISIT MOD MDM: CPT

## 2022-06-19 PROCEDURE — 36415 COLL VENOUS BLD VENIPUNCTURE: CPT

## 2022-06-19 PROCEDURE — 93005 ELECTROCARDIOGRAM TRACING: CPT

## 2022-06-19 RX ADMIN — SODIUM CHLORIDE 1000 ML: 0.9 INJECTION, SOLUTION INTRAVENOUS at 23:47

## 2022-06-20 ENCOUNTER — HOME CARE VISIT (OUTPATIENT)
Dept: HOME HEALTH SERVICES | Facility: HOME HEALTHCARE | Age: 87
End: 2022-06-20
Payer: MEDICARE

## 2022-06-20 VITALS
DIASTOLIC BLOOD PRESSURE: 60 MMHG | OXYGEN SATURATION: 96 % | RESPIRATION RATE: 20 BRPM | TEMPERATURE: 97.5 F | SYSTOLIC BLOOD PRESSURE: 90 MMHG | HEART RATE: 60 BPM

## 2022-06-20 LAB
2HR DELTA HS TROPONIN: -2 NG/L
ANION GAP SERPL CALCULATED.3IONS-SCNC: 8 MMOL/L (ref 4–13)
BUN SERPL-MCNC: 53 MG/DL (ref 5–25)
CALCIUM SERPL-MCNC: 9.6 MG/DL (ref 8.4–10.2)
CARDIAC TROPONIN I PNL SERPL HS: 14 NG/L
CHLORIDE SERPL-SCNC: 110 MMOL/L (ref 96–108)
CO2 SERPL-SCNC: 21 MMOL/L (ref 21–32)
CREAT SERPL-MCNC: 1.21 MG/DL (ref 0.6–1.3)
GFR SERPL CREATININE-BSD FRML MDRD: 39 ML/MIN/1.73SQ M
GLUCOSE SERPL-MCNC: 103 MG/DL (ref 65–140)
POTASSIUM SERPL-SCNC: 4.5 MMOL/L (ref 3.5–5.3)
SODIUM SERPL-SCNC: 139 MMOL/L (ref 135–147)

## 2022-06-20 PROCEDURE — 36415 COLL VENOUS BLD VENIPUNCTURE: CPT | Performed by: EMERGENCY MEDICINE

## 2022-06-20 PROCEDURE — 99284 EMERGENCY DEPT VISIT MOD MDM: CPT | Performed by: EMERGENCY MEDICINE

## 2022-06-20 PROCEDURE — 96361 HYDRATE IV INFUSION ADD-ON: CPT

## 2022-06-20 PROCEDURE — 400013 VN SOC

## 2022-06-20 PROCEDURE — G0299 HHS/HOSPICE OF RN EA 15 MIN: HCPCS

## 2022-06-20 PROCEDURE — 80048 BASIC METABOLIC PNL TOTAL CA: CPT | Performed by: EMERGENCY MEDICINE

## 2022-06-20 NOTE — ED PROVIDER NOTES
History  Chief Complaint   Patient presents with    Medical Problem     Per EMS, pt took a 4-5 hour nap and was slightly confused per family upon waking  Pt now awake, alert and answering questions appropriately with no complaints at this time  Patient is a 63-year-old female with history of hypertension, CKD 3, lung mass presenting for evaluation of altered mental status  She is accompanied by her daughter who states earlier she went down for nap and woke up after napping for longer than usual   After waking the patient went to the refrigerator and stood there  Her actions concerned daughter inquired regarding what the patient was doing  The patient was not responsive at that time, standing next to the refrigerator  Within a short period of time the patient's transient period of reduced responsiveness resolved but daughter proceeded to bring patient to emergency department for evaluation  Patient states she is otherwise well and endorses mild discontent with the length of her nap  She further denies change in vision, headache, chest pain, back pain, abdominal pain  She states she is otherwise well without complaint  Prior to Admission Medications   Prescriptions Last Dose Informant Patient Reported? Taking? ALPRAZolam (XANAX) 0 25 mg tablet   No No   Sig: Take 1 tablet (0 25 mg total) by mouth 3 (three) times a day as needed for anxiety   ASPIRIN 81 PO   Yes No   Sig: Take 1 tablet by mouth daily  Indications:   B Complex Vitamins (B-COMPLEX/B-12 PO)   Yes No   Sig: Take 1,500 mg by mouth 2 (two) times a day     Cranberry 71064 MG CAPS   Yes No   Sig: Take 1 tablet by mouth 2 (two) times a day     acetaminophen (TYLENOL) 325 mg tablet   Yes No   Sig: Take 2 tablets by mouth every 6 (six) hours   amLODIPine (NORVASC) 5 mg tablet   No No   Sig: Take 1 tablet (5 mg total) by mouth 2 (two) times a day   apixaban (ELIQUIS) 5 mg   No No   Sig: Take 2 tablets (10 mg total) by mouth in the morning and 2 tablets (10 mg total) in the evening  Do all this for 4 days  apixaban (ELIQUIS) 5 mg   No No   Sig: Take 1 tablet (5 mg total) by mouth 2 (two) times a day   aspirin 81 MG tablet   Yes No   Sig: Take 81 mg by mouth every morning  carvedilol (Coreg) 3 125 mg tablet   No No   Sig: Take 1 tablet (3 125 mg total) by mouth 2 (two) times a day with meals   losartan (COZAAR) 50 mg tablet   No No   Sig: take 2 tablets by mouth once daily   methimazole (TAPAZOLE) 5 mg tablet   No No   Sig: take 1 tablet by mouth once daily MONDAY TO [facility]   Patient taking differently: take 1 tablet by mouth once daily MONDAY Wednesday & FRIDAY SKIP ON SATURDAYS AND SUNDAYS    oxygen gas   Yes No   Sig: Inhale 4 L/min continuous  Indications: shortness of breath   triamterene-hydrochlorothiazide (DYAZIDE) 37 5-25 mg per capsule   Yes No   Sig: Take 1 capsule by mouth every morning PRN   venlafaxine (EFFEXOR-XR) 150 mg 24 hr capsule   Yes No   Sig: Take 150 mg by mouth daily PRN      Facility-Administered Medications: None       Past Medical History:   Diagnosis Date    Allergic     Anxiety     Arthritis     left knee    Cancer ([facility] [address] )     skin: face, nose and back (100+ sutures)    Chronic kidney disease     stage 2    Coronary artery disease     Depression     Disease of thyroid gland     Edema     Per [place]     Generalized headaches     Per [place]     GERD (gastroesophageal reflux disease)     Per [place]acy Cap     Hiatal hernia     History of echocardiogram 02/2016    Stress Echo 3/15/16- Normal stress echo with no echocardiographic evidence of myocardial infraction or myocardial ischemia  This was technically difficult study  Echo 2/9/2016- Mild LVH  EF of 50-55%  LV diastolic noncompliance  Mitral annular calcification with 2+ MR  Normal opening of the cardiac valves    History of EKG 10/20/2017     Normal sinus rhythm, possible left atrial enlargement  Borderline ECG   EKG 7/28/17- Sinus rhytm with 1st degree AV block  Cannot rule out inferior infarct, age undetermined  Abnormal ECG  EKG 1/13/17- Normal sinus rhythm, nonspecific T wave abnormality  Abnormal ECG  EKG 2/26/16- Sinus rhythm with 1st degree AV block  Possible left atrial enlargement  Cannot rule out inferior infarct, age Rebeca Flatten History of Holter monitoring 12/22/2015    The baseline rhythm was of sinus origin with an average rate of 72bpm, (range: 59-98bpm)  There were frequant single uniform PVCs (averaging over 1600 beats per hour)  There were a few episodes of asymptomatic sinus arrhythmia with sinus bradycardia  There were a few PACs (averaging 8 4 beats per hour)  Aside from sinus arrhythmia, there were no other sustained cardiac dysrhythmias   History of stress test 03/15/2016     Negative EKG part of stress echo at 100% age predicted maximum heart rate  Frequant PVC's in the pattern of ventricular bigeminy at rest and in recovery with significant suppression during exercise  No evidence of complex dysrhythmias  Resting hypertension with normal blood pressure response to exercise  Exaggreated heart rate response to exercise  Cardiolite 1/8/16- Moderately abnormal strudy wi    [place] (hard of hearing)     bilat HA    Hypertension     Macular degeneration     Murmur, heart     Osteoporosis     Per [place]     Subclinical hyperthyroidism     Thyroid nodule     right thyroid nodule (neg biopsy, pe post) -Per [place]     Vision problems     mac degenaration- Per [place]        Past Surgical History:   Procedure Laterality Date    APPENDECTOMY      age 15   Andrea Drop EYE SURGERY Left     Cataract with IOL    JOINT REPLACEMENT Left 2010    knee    [place] XCAPSL CTRC RMVL INSJ IO LENS PROSTH W/O ECP Right 8/8/2016    Procedure: EXTRACTION EXTRACAPSULAR CATARACT PHACO INTRAOCULAR LENS (IOL);   Surgeon: Kerry Modi MD;  Location: [facility] MAIN OR;  Service: Ophthalmology    RESECTION TONSIL RADICAL      SKIN BIOPSY      face, back, right leg    SPLENECTOMY, TOTAL         Family History   Problem Relation Age of Onset    Diabetes Mother     Heart disease Father         massive MI exp 72    Kidney disease Brother     Ovarian cancer Daughter         Ovarian cancer, disseminated - Per [place]     Hypertension Daughter         Per [employer]      I have reviewed and agree with the history as documented  E-Cigarette/Vaping    E-Cigarette Use Never User      E-Cigarette/Vaping Substances    Nicotine No     THC No     CBD No     Flavoring No     Other No     Unknown No      Social History     Tobacco Use    Smoking status: Never Smoker    Smokeless tobacco: Never Used   Vaping Use    Vaping Use: Never used   Substance Use Topics    Alcohol use: Never    Drug use: No        Review of Systems   Constitutional: Negative  HENT: Negative  Eyes: Negative  Respiratory: Negative  Cardiovascular: Negative  Gastrointestinal: Negative  Endocrine: Negative  Genitourinary: Negative  Musculoskeletal: Negative  Skin: Negative  Allergic/Immunologic: Negative  Neurological: Negative  Hematological: Negative  Psychiatric/Behavioral: Negative  All other systems reviewed and are negative  Physical Exam  ED Triage Vitals   Temperature Pulse Respirations Blood Pressure SpO2   06/19/22 2038 06/19/22 2038 06/19/22 2038 06/19/22 2044 06/19/22 2038   98 °F (36 7 °C) (!) 46 18 112/53 98 %      Temp Source Heart Rate Source Patient Position - Orthostatic VS BP Location FiO2 (%)   06/19/22 2038 06/19/22 2038 06/19/22 2038 06/19/22 2038 --   Oral Monitor Lying Right arm       Pain Score       --                    Orthostatic Vital Signs  Vitals:    06/19/22 2038 06/19/22 2044   BP:  112/53   Pulse: (!) 46    Patient Position - Orthostatic VS: Lying        Physical Exam  Vitals and nursing note reviewed  Constitutional:       General: She is not in acute distress  Appearance: Normal appearance   She is not ill-appearing, toxic-appearing or diaphoretic  HENT:      Head: Normocephalic and atraumatic  Eyes:      General: No scleral icterus  Right eye: No discharge  Left eye: No discharge  Extraocular Movements: Extraocular movements intact  Conjunctiva/sclera: Conjunctivae normal       Pupils: Pupils are equal, round, and reactive to light  Cardiovascular:      Rate and Rhythm: Bradycardia present  Pulses: Normal pulses  Heart sounds: Murmur heard  No friction rub  No gallop  Pulmonary:      Effort: Pulmonary effort is normal  No respiratory distress  Breath sounds: Normal breath sounds  No stridor  No wheezing, rhonchi or rales  Abdominal:      General: Abdomen is flat  Bowel sounds are normal  There is no distension  Palpations: Abdomen is soft  Tenderness: There is no abdominal tenderness  There is no guarding or rebound  Musculoskeletal:         General: No swelling  Normal range of motion  Cervical back: Normal range of motion  No rigidity  Right lower leg: No edema  Left lower leg: No edema  Skin:     General: Skin is warm and dry  Capillary Refill: Capillary refill takes less than 2 seconds  Coloration: Skin is not jaundiced  Findings: No bruising or lesion  Neurological:      General: No focal deficit present  Mental Status: She is alert and oriented to person, place, and time  Mental status is at baseline  Psychiatric:         Mood and Affect: Mood normal          Behavior: Behavior normal          Thought Content:  Thought content normal          Judgment: Judgment normal          ED Medications  Medications   sodium chloride 0 9 % bolus 1,000 mL (0 mL Intravenous Stopped 6/20/22 0150)       Diagnostic Studies  Results Reviewed     Procedure Component Value Units Date/Time    Basic metabolic panel [[id number]]  (Abnormal) Collected: 06/20/22 0215    Lab Status: Final result Specimen: Blood Updated: 06/20/22 0319     Sodium 139 mmol/L      Potassium 4 5 mmol/L      Chloride 110 mmol/L      CO2 21 mmol/L      ANION GAP 8 mmol/L      BUN 53 mg/dL      Creatinine 1 21 mg/dL      Glucose 103 mg/dL      Calcium 9 6 mg/dL      eGFR 39 ml/min/1 73sq m     Narrative:      [place] guidelines for Chronic Kidney Disease (CKD):     Stage 1 with normal or high GFR (GFR > 90 mL/min/1 73 square meters)    Stage 2 Mild CKD (GFR = 60-89 mL/min/1 73 square meters)    Stage 3A Moderate CKD (GFR = 45-59 mL/min/1 73 square meters)    Stage 3B Moderate CKD (GFR = 30-44 mL/min/1 73 square meters)    Stage 4 Severe CKD (GFR = 15-29 mL/min/1 73 square meters)    Stage 5 End Stage CKD (GFR <15 mL/min/1 73 square meters)  Note: GFR calculation is accurate only with a steady state creatinine    HS Troponin I 2hr [[id number]]  (Normal) Collected: 06/19/22 2348    Lab Status: Final result Specimen: Blood from Arm, Right Updated: 06/20/22 0034     hs TnI 2hr 14 ng/L      Delta 2hr hsTnI -2 ng/L     HS Troponin 0hr (reflex protocol) [[id number]]  (Normal) Collected: 06/19/22 2143    Lab Status: Final result Specimen: Blood from Arm, Left Updated: 06/19/22 2218     hs TnI 0hr 16 ng/L     Comprehensive metabolic panel [[id number]]  (Abnormal) Collected: 06/19/22 2143    Lab Status: Final result Specimen: Blood from Arm, Left Updated: 06/19/22 2217     Sodium 138 mmol/L      Potassium 5 5 mmol/L      Chloride 107 mmol/L      CO2 23 mmol/L      ANION GAP 8 mmol/L      BUN 59 mg/dL      Creatinine 1 51 mg/dL      Glucose 128 mg/dL      Calcium 10 2 mg/dL      Corrected Calcium 10 7 mg/dL      AST 11 U/L      ALT 10 U/L      Alkaline Phosphatase 88 U/L      Total Protein 6 2 g/dL      Albumin 3 4 g/dL      Total Bilirubin 0 73 mg/dL      eGFR 29 ml/min/1 73sq m     Narrative:      [place] guidelines for Chronic Kidney Disease (CKD):     Stage 1 with normal or high GFR (GFR > 90 mL/min/1 73 square meters)    Stage 2 Mild CKD (GFR = 60-89 mL/min/1 73 square meters)    Stage 3A Moderate CKD (GFR = 45-59 mL/min/1 73 square meters)    Stage 3B Moderate CKD (GFR = 30-44 mL/min/1 73 square meters)    Stage 4 Severe CKD (GFR = 15-29 mL/min/1 73 square meters)    Stage 5 End Stage CKD (GFR <15 mL/min/1 73 square meters)  Note: GFR calculation is accurate only with a steady state creatinine    CBC and differential [[id number]]  (Abnormal) Collected: 06/19/22 2143    Lab Status: Final result Specimen: Blood from Arm, Left Updated: 06/19/22 2155     WBC 13 05 Thousand/uL      RBC 4 11 Million/uL      Hemoglobin 10 7 g/dL      Hematocrit 34 6 %      MCV 84 fL      MCH 26 0 pg      MCHC 30 9 g/dL      RDW 18 0 %      MPV 10 9 fL      Platelets 889 Thousands/uL      nRBC 0 /100 WBCs      Neutrophils Relative 63 %      Immat GRANS % 1 %      Lymphocytes Relative 31 %      Monocytes Relative 5 %      Eosinophils Relative 0 %      Basophils Relative 0 %      Neutrophils Absolute 8 17 Thousands/µL      Immature Grans Absolute 0 07 Thousand/uL      Lymphocytes Absolute 4 10 Thousands/µL      Monocytes Absolute 0 65 Thousand/µL      Eosinophils Absolute 0 02 Thousand/µL      Basophils Absolute 0 04 Thousands/µL     Fingerstick Glucose (POCT) [[id number]]  (Abnormal) Collected: 06/19/22 2038    Lab Status: Final result Updated: 06/19/22 2040     POC Glucose 162 mg/dl                  No orders to display         Procedures  Procedures      ED Course                                       MDM  Number of Diagnoses or Management Options  Fatigue: new and does not require workup  Diagnosis management comments: -patient presenting for evaluation of transient, resolved altered mental status  -physical exam significant for marked bradycardia  Upon further discussion this is patient's baseline and she has been bradycardic for quite some time    Daughter is aware of this     -laboratory evaluation initially significant for what appears to be new cake ARASELI  Will give patient is fluids and reassess with additional BMP after fluid administration     -this patient was signed out to  Dr Mariella Cheadle       Amount and/or Complexity of Data Reviewed  Clinical lab tests: ordered and reviewed  Tests in the medicine section of CPT®: ordered and reviewed  Decide to obtain previous medical records or to obtain history from someone other than the patient: yes  Review and summarize past medical records: yes  Discuss the patient with other providers: yes  Independent visualization of images, tracings, or specimens: yes        Disposition  Final diagnoses:   Fatigue     Time reflects when diagnosis was documented in both MDM as applicable and the Disposition within this note     Time User Action Codes Description Comment    6/20/2022  3:27 AM Geoff ALVARADO Add [R53 83] Fatigue       ED Disposition     ED Disposition   Discharge    Condition   Stable    Date/Time   Mon Jun 20, 2022  3:26 AM    Comment   Leana hSepherd discharge to home/self care                 Follow-up Information     Follow up With Specialties Details Why Contact Carolina Abebe MD Internal Medicine In 1 day  [address] [place] [address]  [phone]            Discharge Medication List as of 6/20/2022  3:27 AM      CONTINUE these medications which have NOT CHANGED    Details   acetaminophen (TYLENOL) 325 mg tablet Take 2 tablets by mouth every 6 (six) hours, Historical Med      ALPRAZolam (XANAX) 0 25 mg tablet Take 1 tablet (0 25 mg total) by mouth 3 (three) times a day as needed for anxiety, Starting Mon 4/25/2022, Normal      amLODIPine (NORVASC) 5 mg tablet Take 1 tablet (5 mg total) by mouth 2 (two) times a day, Starting Mon 4/25/2022, Until Sun 7/24/2022, Normal      apixaban (ELIQUIS) 5 mg Take 1 tablet (5 mg total) by mouth 2 (two) times a day, Starting Tue 5/31/2022, Until Thu 6/30/2022, Normal      aspirin 81 MG tablet Take 81 mg by mouth every morning , Historical Med      ASPIRIN 81 PO Take 1 tablet by mouth daily  Indications:  , Historical Med      B Complex Vitamins (B-COMPLEX/B-12 PO) Take 1,500 mg by mouth 2 (two) times a day  , Historical Med      carvedilol (Coreg) 3 125 mg tablet Take 1 tablet (3 125 mg total) by mouth 2 (two) times a day with meals, Starting Tue 5/31/2022, Normal      Cranberry 83206 MG CAPS Take 1 tablet by mouth 2 (two) times a day , Historical Med      losartan (COZAAR) 50 mg tablet take 2 tablets by mouth once daily, Normal      methimazole (TAPAZOLE) 5 mg tablet take 1 tablet by mouth once daily MONDAY TO FRIDAY SKIP ON SATURDAYS AND SUNDAYS, Normal      oxygen gas Inhale 4 L/min continuous  Indications: shortness of breath, Historical Med      triamterene-hydrochlorothiazide (DYAZIDE) 37 5-25 mg per capsule Take 1 capsule by mouth every morning PRN, Historical Med      venlafaxine (EFFEXOR-XR) 150 mg 24 hr capsule Take 150 mg by mouth daily PRN, Historical Med           No discharge procedures on file  PDMP Review       Value Time User    PDMP Reviewed  Yes 4/25/2022 12:36 PM Agnes Manjarrez MD           ED Provider  Attending physically available and evaluated Sedonia Most  I managed the patient along with the ED Attending      Electronically Signed by         Sally Suarez DO  06/21/22 5682

## 2022-06-21 LAB
ATRIAL RATE: 0 BPM
ATRIAL RATE: 92 BPM
P AXIS: 36 DEGREES
PR INTERVAL: 214 MS
QRS AXIS: 0 DEGREES
QRS AXIS: 81 DEGREES
QRSD INTERVAL: 0 MS
QRSD INTERVAL: 76 MS
QT INTERVAL: 0 MS
QT INTERVAL: 332 MS
QTC INTERVAL: 0 MS
QTC INTERVAL: 410 MS
T WAVE AXIS: 0 DEGREES
T WAVE AXIS: 43 DEGREES
VENTRICULAR RATE: 0 BPM
VENTRICULAR RATE: 92 BPM

## 2022-06-21 PROCEDURE — 93010 ELECTROCARDIOGRAM REPORT: CPT | Performed by: INTERNAL MEDICINE

## 2022-06-22 ENCOUNTER — TELEMEDICINE (OUTPATIENT)
Dept: FAMILY MEDICINE CLINIC | Facility: CLINIC | Age: 87
End: 2022-06-22
Payer: MEDICARE

## 2022-06-22 DIAGNOSIS — N17.9 ACUTE RENAL FAILURE, UNSPECIFIED ACUTE RENAL FAILURE TYPE (HCC): Primary | ICD-10-CM

## 2022-06-22 PROCEDURE — 99213 OFFICE O/P EST LOW 20 MIN: CPT | Performed by: INTERNAL MEDICINE

## 2022-06-22 NOTE — PROGRESS NOTES
Virtual Regular Visit    Verification of patient location:    Patient is located in the following state in which I hold an active license [place]      Assessment/Plan:    Problem List Items Addressed This Visit    None              Reason for visit is   Chief Complaint   Patient presents with    Virtual Regular Visit        Encounter provider Jose Alejandro Villa MD    Provider located at [address] [place] [address] [address] [facility] [address] [phone]      Recent Visits  No visits were found meeting these conditions  Showing recent visits within past 7 days and meeting all other requirements  Future Appointments  No visits were found meeting these conditions  Showing future appointments within next 150 days and meeting all other requirements       The patient was identified by name and date of birth  Jessica Chakraborty was informed that this is a telemedicine visit and that the visit is being conducted through [address] Now and patient was informed that this is a secure, HIPAA-compliant platform  She agrees to proceed     My office door was closed  No one else was in the room  She acknowledged consent and understanding of privacy and security of the video platform  The patient has agreed to participate and understands they can discontinue the visit at any time  Patient is aware this is a billable service  Subjective  Jessica Chakraborty is a 80 y o  female patient was seen in emergency room due to increasing weakness and altered mental status  She was found to be dehydrated with worsening renal failure  She was given some IV fluid  Patient felt better  Renal function improved  So she was discharged home  Now she is drinking enough  Appetite is still okay  No significant pain in her body  No falls  No more altered mental status  No fever or chills  Edema has been under control without diuretic pill  No chest pain no abdominal pain         HPI     Past Medical History:   Diagnosis Date    Allergic     Anxiety     Arthritis     left knee    Cancer (HCC)     skin: face, nose and back (100+ sutures)    Chronic kidney disease     stage 2    Coronary artery disease     Depression     Disease of thyroid gland     Edema     Per Uri Olivares     Generalized headaches     Per Uri Olivares     GERD (gastroesophageal reflux disease)     Per Uri Olivares     Hiatal hernia     History of echocardiogram 02/2016    Stress Echo 3/15/16- Normal stress echo with no echocardiographic evidence of myocardial infraction or myocardial ischemia  This was technically difficult study  Echo 2/9/2016- Mild LVH  EF of 50-55%  LV diastolic noncompliance  Mitral annular calcification with 2+ MR  Normal opening of the cardiac valves    History of EKG 10/20/2017     Normal sinus rhythm, possible left atrial enlargement  Borderline ECG  EKG 7/28/17- Sinus rhytm with 1st degree AV block  Cannot rule out inferior infarct, age undetermined  Abnormal ECG  EKG 1/13/17- Normal sinus rhythm, nonspecific T wave abnormality  Abnormal ECG  EKG 2/26/16- Sinus rhythm with 1st degree AV block  Possible left atrial enlargement  Cannot rule out inferior infarct, age Mccarthy Londono History of Holter monitoring 12/22/2015    The baseline rhythm was of sinus origin with an average rate of 72bpm, (range: 59-98bpm)  There were frequant single uniform PVCs (averaging over 1600 beats per hour)  There were a few episodes of asymptomatic sinus arrhythmia with sinus bradycardia  There were a few PACs (averaging 8 4 beats per hour)  Aside from sinus arrhythmia, there were no other sustained cardiac dysrhythmias   History of stress test 03/15/2016     Negative EKG part of stress echo at 100% age predicted maximum heart rate  Frequant PVC's in the pattern of ventricular bigeminy at rest and in recovery with significant suppression during exercise  No evidence of complex dysrhythmias   Resting hypertension with normal blood pressure response to exercise  Exaggreated heart rate response to exercise  Cardiolite 1/8/16- Moderately abnormal strudy wi    [place] (hard of hearing)     bilat HA    Hypertension     Macular degeneration     Murmur, heart     Osteoporosis     Per [place]     Subclinical hyperthyroidism     Thyroid nodule     right thyroid nodule (neg biopsy, pe post) -Per Tom Galloway     Vision problems     mac degenaration- Per Tom Galloway        Past Surgical History:   Procedure Laterality Date    APPENDECTOMY      age 15   Nino Flower EYE SURGERY Left     Cataract with IOL    JOINT REPLACEMENT Left 2010    knee    CA XCAPSL CTRC RMVL INSJ IO LENS PROSTH W/O ECP Right 8/8/2016    Procedure: EXTRACTION EXTRACAPSULAR CATARACT PHACO INTRAOCULAR LENS (IOL); Surgeon: Autumn Hilario MD;  Location: [facility] MAIN OR;  Service: Ophthalmology    RESECTION TONSIL RADICAL      SKIN BIOPSY      face, back, right leg    SPLENECTOMY, TOTAL         Current Outpatient Medications   Medication Sig Dispense Refill    acetaminophen (TYLENOL) 325 mg tablet Take 2 tablets by mouth every 6 (six) hours      ALPRAZolam (XANAX) 0 25 mg tablet Take 1 tablet (0 25 mg total) by mouth 3 (three) times a day as needed for anxiety 60 tablet 3    amLODIPine (NORVASC) 5 mg tablet Take 1 tablet (5 mg total) by mouth 2 (two) times a day 180 tablet 2    apixaban (ELIQUIS) 5 mg Take 2 tablets (10 mg total) by mouth in the morning and 2 tablets (10 mg total) in the evening  Do all this for 4 days  16 tablet 0    apixaban (ELIQUIS) 5 mg Take 1 tablet (5 mg total) by mouth 2 (two) times a day 60 tablet 12    aspirin 81 MG tablet Take 81 mg by mouth every morning   ASPIRIN 81 PO Take 1 tablet by mouth daily  Indications:         B Complex Vitamins (B-COMPLEX/B-12 PO) Take 1,500 mg by mouth 2 (two) times a day        carvedilol (Coreg) 3 125 mg tablet Take 1 tablet (3 125 mg total) by mouth 2 (two) times a day with meals 180 tablet 3    Cranberry 27542 MG CAPS Take 1 tablet by mouth 2 (two) times a day   losartan (COZAAR) 50 mg tablet take 2 tablets by mouth once daily 180 tablet 2    methimazole (TAPAZOLE) 5 mg tablet take 1 tablet by mouth once daily MONDAY TO FRIDAY SKIP ON SATURDAYS AND SUNDAYS (Patient taking differently: take 1 tablet by mouth once daily MONDAY Wednesday & FRIDAY SKIP ON SATURDAYS AND SUNDAYS ) 60 tablet 12    oxygen gas Inhale 4 L/min continuous  Indications: shortness of breath      venlafaxine (EFFEXOR-XR) 150 mg 24 hr capsule Take 150 mg by mouth daily PRN       No current facility-administered medications for this visit  Allergies   Allergen Reactions    Colchicine     Hydralazine Swelling     Cheeks swelled after IV Hydralazine    Tramadol        Review of Systems    Video Exam    There were no vitals filed for this visit  Physical Exam  Constitutional:       Appearance: She is not ill-appearing or diaphoretic  Pulmonary:      Effort: No respiratory distress  Neurological:      Mental Status: She is alert  Psychiatric:         Behavior: Behavior normal           I spent 17 minutes directly with the patient during this visit    VIRTUAL VISIT Munira Davila verbally agrees to participate in [employer]  Pt is aware that [employer] could be limited without vital signs or the ability to perform a full hands-on physical Ya Eliana understands she or the provider may request at any time to terminate the video visit and request the patient to seek care or treatment in person

## 2022-06-22 NOTE — ASSESSMENT & PLAN NOTE
Emergency room record reviewed  She was given IV fluid  Renal function improved to 39 from 20/9  Now patient has been drinking enough  She has still has a good appetite  Discussed with the daughter  She is not using her diuretic pills

## 2022-06-23 ENCOUNTER — HOME CARE VISIT (OUTPATIENT)
Dept: HOME HEALTH SERVICES | Facility: HOME HEALTHCARE | Age: 87
End: 2022-06-23
Payer: MEDICARE

## 2022-06-24 NOTE — CASE COMMUNICATION
Spoke with pt's daughter Amanda Reynolds to schedule SN visit for 6/23  Visit canceled because pt has dental appointment and will not be available for SN visit  Next SN visit scheduled for 6/27  Daughter states understanding of same

## 2022-06-27 ENCOUNTER — HOME CARE VISIT (OUTPATIENT)
Dept: HOME HEALTH SERVICES | Facility: HOME HEALTHCARE | Age: 87
End: 2022-06-27
Payer: MEDICARE

## 2022-06-27 VITALS
HEART RATE: 52 BPM | TEMPERATURE: 98.4 F | RESPIRATION RATE: 20 BRPM | OXYGEN SATURATION: 99 % | DIASTOLIC BLOOD PRESSURE: 60 MMHG | SYSTOLIC BLOOD PRESSURE: 100 MMHG

## 2022-06-27 PROCEDURE — G0299 HHS/HOSPICE OF RN EA 15 MIN: HCPCS

## 2022-06-28 NOTE — CASE COMMUNICATION
Pt's PO2 99% with O2 at 4 lpm   O2 decreased to 3 lpm   After 15 minutes and ambulating 30 feet, PO2 98%  Pt denies sob with decreased O2 liter flow

## 2022-07-06 ENCOUNTER — HOME CARE VISIT (OUTPATIENT)
Dept: HOME HEALTH SERVICES | Facility: HOME HEALTHCARE | Age: 87
End: 2022-07-06
Payer: MEDICARE

## 2022-07-06 VITALS
DIASTOLIC BLOOD PRESSURE: 70 MMHG | OXYGEN SATURATION: 97 % | RESPIRATION RATE: 20 BRPM | TEMPERATURE: 97.7 F | SYSTOLIC BLOOD PRESSURE: 109 MMHG | HEART RATE: 48 BPM

## 2022-07-06 PROCEDURE — G0299 HHS/HOSPICE OF RN EA 15 MIN: HCPCS

## 2022-07-06 NOTE — CASE COMMUNICATION
O2 decreased to 2lpm   PO2 95% after 15 minutes at rest and 94% after returning from bathroom  Denies dyspnea nor SOB

## 2022-07-11 ENCOUNTER — TELEPHONE (OUTPATIENT)
Dept: FAMILY MEDICINE CLINIC | Facility: CLINIC | Age: 87
End: 2022-07-11

## 2022-07-11 NOTE — TELEPHONE ENCOUNTER
Abel called and left msg stating that she would like you to [address] ,, she stated that thinks Mom needs to go on HOSPICE regarding beginning stages of DEMENTIA     PLEASE CALL HER ASAP

## 2022-07-12 NOTE — TELEPHONE ENCOUNTER
I am unable to find [facility]  Brian Stanford wanted a referral to [facility]  Not sure how to place and order

## 2022-07-14 ENCOUNTER — HOME CARE VISIT (OUTPATIENT)
Dept: HOME HEALTH SERVICES | Facility: HOME HEALTHCARE | Age: 87
End: 2022-07-14
Payer: MEDICARE

## 2022-07-14 NOTE — CASE COMMUNICATION
Pt discharged from [facility] today, 7/14/22, per daughter request   Daughter reports she has started process to have pt admitted to SNF

## 2022-07-15 ENCOUNTER — HOME CARE VISIT (OUTPATIENT)
Dept: HOME HEALTH SERVICES | Facility: HOME HEALTHCARE | Age: 87
End: 2022-07-15

## 2022-07-15 DIAGNOSIS — F02.80 EARLY ONSET ALZHEIMER'S DEMENTIA WITHOUT BEHAVIORAL DISTURBANCE (HCC): Primary | ICD-10-CM

## 2022-07-15 DIAGNOSIS — G30.0 EARLY ONSET ALZHEIMER'S DEMENTIA WITHOUT BEHAVIORAL DISTURBANCE (HCC): Primary | ICD-10-CM

## 2022-07-15 NOTE — TELEPHONE ENCOUNTER
Order is placed   Called and spoke to Dorie Meadows and let her know I placed referral   Jimmy Kauffman

## 2022-07-20 ENCOUNTER — TELEMEDICINE (OUTPATIENT)
Dept: FAMILY MEDICINE CLINIC | Facility: CLINIC | Age: 87
End: 2022-07-20
Payer: MEDICARE

## 2022-07-20 DIAGNOSIS — R91.8 MASS OF LUNG: ICD-10-CM

## 2022-07-20 DIAGNOSIS — F02.80 ALZHEIMER'S DEMENTIA WITHOUT BEHAVIORAL DISTURBANCE, UNSPECIFIED TIMING OF DEMENTIA ONSET: Primary | ICD-10-CM

## 2022-07-20 DIAGNOSIS — G30.9 ALZHEIMER'S DEMENTIA WITHOUT BEHAVIORAL DISTURBANCE, UNSPECIFIED TIMING OF DEMENTIA ONSET: Primary | ICD-10-CM

## 2022-07-20 PROCEDURE — 99213 OFFICE O/P EST LOW 20 MIN: CPT | Performed by: INTERNAL MEDICINE

## 2022-07-20 NOTE — PROGRESS NOTES
Virtual Regular Visit    Verification of patient location:    Patient is located in the following state in which I hold an active license [place]      Assessment/Plan:    Problem List Items Addressed This Visit        Nervous and Auditory    Alzheimer's dementia without behavioral disturbance ([facility] [address] ) - Primary     Declining faster  Losing weight  Needs nursing home level hospice care  Form filled out            Other    Mass of lung     Likely malignant  Patient is losing weight and declining  Reason for visit is   Chief Complaint   Patient presents with    Virtual Regular Visit        Encounter provider Lynette Quintero MD    Provider located at [address] [place] [address] [address] [facility] [id number] [phone]      Recent Visits  No visits were found meeting these conditions  Showing recent visits within past 7 days and meeting all other requirements  Today's Visits  Date Type Provider Dept   07/20/22 Telemedicine [facility]'s visits and meeting all other requirements  Future Appointments  No visits were found meeting these conditions  Showing future appointments within next 150 days and meeting all other requirements       The patient was identified by name and date of birth  Cristel Nuno was informed that this is a telemedicine visit and that the visit is being conducted through [address] [place] [address] Now and patient was informed that this is a secure, HIPAA-compliant platform  She agrees to proceed     My office door was closed  No one else was in the room  She acknowledged consent and understanding of privacy and security of the video platform  The patient has agreed to participate and understands they can discontinue the visit at any time  Patient is aware this is a billable service  Subjective  Cristel Nuno is a 80 y o  female patient had a virtual visit in presence of her daughter  Memory is declining faster  Losing weight  No significant pain  She is on continuous oxygen  She does have significant trouble ambulating  No hallucinations  No abdominal pain  No diarrhea  No recent fall  Love [facility] [employer]     Past Medical History:   Diagnosis Date    Allergic     Anxiety     Arthritis     left knee    Cancer (HCC)     skin: face, nose and back (100+ sutures)    Chronic kidney disease     stage 2    Coronary artery disease     Depression     Disease of thyroid gland     Edema     Per Young Gails     Generalized headaches     Per Young Gails     GERD (gastroesophageal reflux disease)     Per Young Gails     Hiatal hernia     History of echocardiogram 02/2016    Stress Echo 3/15/16- Normal stress echo with no echocardiographic evidence of myocardial infraction or myocardial ischemia  This was technically difficult study  Echo 2/9/2016- Mild LVH  EF of 50-55%  LV diastolic noncompliance  Mitral annular calcification with 2+ MR  Normal opening of the cardiac valves    History of EKG 10/20/2017     Normal sinus rhythm, possible left atrial enlargement  Borderline ECG  EKG 7/28/17- Sinus rhytm with 1st degree AV block  Cannot rule out inferior infarct, age undetermined  Abnormal ECG  EKG 1/13/17- Normal sinus rhythm, nonspecific T wave abnormality  Abnormal ECG  EKG 2/26/16- Sinus rhythm with 1st degree AV block  Possible left atrial enlargement  Cannot rule out inferior infarct, age [facility] History of Holter monitoring 12/22/2015    The baseline rhythm was of sinus origin with an average rate of 72bpm, (range: 59-98bpm)  There were frequant single uniform PVCs (averaging over 1600 beats per hour)  There were a few episodes of asymptomatic sinus arrhythmia with sinus bradycardia  There were a few PACs (averaging 8 4 beats per hour)  Aside from sinus arrhythmia, there were no other sustained cardiac dysrhythmias       History of stress test 03/15/2016     Negative EKG part of stress echo at 100% age predicted maximum heart rate  Frequant PVC's in the pattern of ventricular bigeminy at rest and in recovery with significant suppression during exercise  No evidence of complex dysrhythmias  Resting hypertension with normal blood pressure response to exercise  Exaggreated heart rate response to exercise  Cardiolite 1/8/16- Moderately abnormal strudy wi    [place] (hard of hearing)     bilat HA    Hypertension     Macular degeneration     Murmur, heart     Osteoporosis     Per [facility]     Subclinical hyperthyroidism     Thyroid nodule     right thyroid nodule (neg biopsy, pe post) -Per [facility]     Vision problems     mac degenaration- Per [facility]        Past Surgical History:   Procedure Laterality Date    APPENDECTOMY      age 15   Rashad Lamprey EYE SURGERY Left     Cataract with IOL    JOINT REPLACEMENT Left 2010    knee    CO XCAPSL CTRC RMVL INSJ IO LENS PROSTH W/O ECP Right 8/8/2016    Procedure: EXTRACTION EXTRACAPSULAR CATARACT PHACO INTRAOCULAR LENS (IOL); Surgeon: Jame Hobson MD;  Location: [facility] MAIN OR;  Service: Ophthalmology    RESECTION TONSIL RADICAL      SKIN BIOPSY      face, back, right leg    SPLENECTOMY, TOTAL         Current Outpatient Medications   Medication Sig Dispense Refill    acetaminophen (TYLENOL) 325 mg tablet Take 2 tablets by mouth every 6 (six) hours      ALPRAZolam (XANAX) 0 25 mg tablet Take 1 tablet (0 25 mg total) by mouth 3 (three) times a day as needed for anxiety 60 tablet 3    amLODIPine (NORVASC) 5 mg tablet Take 1 tablet (5 mg total) by mouth 2 (two) times a day 180 tablet 2    apixaban (ELIQUIS) 5 mg Take 2 tablets (10 mg total) by mouth in the morning and 2 tablets (10 mg total) in the evening  Do all this for 4 days  16 tablet 0    apixaban (ELIQUIS) 5 mg Take 1 tablet (5 mg total) by mouth 2 (two) times a day 60 tablet 12    aspirin 81 MG tablet Take 81 mg by mouth every morning   ASPIRIN 81 PO Take 1 tablet by mouth daily  Indications:         B Complex Vitamins (B-COMPLEX/B-12 PO) Take 1,500 mg by mouth 2 (two) times a day        carvedilol (Coreg) 3 125 mg tablet Take 1 tablet (3 125 mg total) by mouth 2 (two) times a day with meals 180 tablet 3    Cranberry 29836 MG CAPS Take 1 tablet by mouth 2 (two) times a day   losartan (COZAAR) 50 mg tablet take 2 tablets by mouth once daily 180 tablet 2    methimazole (TAPAZOLE) 5 mg tablet take 1 tablet by mouth once daily MONDAY TO FRIDAY SKIP ON SATURDAYS AND SUNDAYS (Patient taking differently: take 1 tablet by mouth once daily MONDAY Wednesday & FRIDAY SKIP ON SATURDAYS AND SUNDAYS ) 60 tablet 12    oxygen gas Inhale 4 L/min continuous  Indications: shortness of breath      venlafaxine (EFFEXOR-XR) 150 mg 24 hr capsule Take 150 mg by mouth daily PRN       No current facility-administered medications for this visit  Allergies   Allergen Reactions    Colchicine     Hydralazine Swelling     Cheeks swelled after IV Hydralazine    Tramadol        Review of Systems    Video Exam    There were no vitals filed for this visit  Physical Exam  Constitutional:       General: She is not in acute distress  Appearance: She is ill-appearing  She is not toxic-appearing  Pulmonary:      Effort: Pulmonary effort is normal    Neurological:      Mental Status: She is alert  Mental status is at baseline  I spent 18 minutes directly with the patient during this visit    VIRTUAL VISIT Munira Davila verbally agrees to participate in [employer]  Pt is aware that [employer] could be limited without vital signs or the ability to perform a full hands-on physical Woody Halsted understands she or the provider may request at any time to terminate the video visit and request the patient to seek care or treatment in person

## 2022-07-26 DIAGNOSIS — I10 ESSENTIAL HYPERTENSION: ICD-10-CM

## 2022-07-27 ENCOUNTER — TELEPHONE (OUTPATIENT)
Dept: FAMILY MEDICINE CLINIC | Facility: CLINIC | Age: 87
End: 2022-07-27

## 2022-07-27 DIAGNOSIS — M25.472 ANKLE EDEMA, BILATERAL: Primary | ICD-10-CM

## 2022-07-27 DIAGNOSIS — M25.471 ANKLE EDEMA, BILATERAL: Primary | ICD-10-CM

## 2022-07-27 RX ORDER — FUROSEMIDE 20 MG/1
20 TABLET ORAL DAILY
Qty: 30 TABLET | Refills: 5 | Status: SHIPPED | OUTPATIENT
Start: 2022-07-27

## 2022-07-27 NOTE — TELEPHONE ENCOUNTER
Daughter called patients ankles are swollen   It started a couple days ago   ----do you want her to start taking water pills?     [facility] [address]

## 2022-07-28 ENCOUNTER — NURSING HOME VISIT (OUTPATIENT)
Dept: GERIATRICS | Facility: OTHER | Age: 87
End: 2022-07-28
Payer: MEDICARE

## 2022-07-28 DIAGNOSIS — I26.94 MULTIPLE SUBSEGMENTAL PULMONARY EMBOLI WITHOUT ACUTE COR PULMONALE (HCC): ICD-10-CM

## 2022-07-28 DIAGNOSIS — I10 ESSENTIAL HYPERTENSION: ICD-10-CM

## 2022-07-28 DIAGNOSIS — J96.01 ACUTE RESPIRATORY FAILURE WITH HYPOXIA (HCC): ICD-10-CM

## 2022-07-28 DIAGNOSIS — R91.8 MASS OF UPPER LOBE OF LEFT LUNG: Primary | ICD-10-CM

## 2022-07-28 DIAGNOSIS — R26.2 AMBULATORY DYSFUNCTION: ICD-10-CM

## 2022-07-28 PROCEDURE — 99326 PR DOMICIL/REST HOME NEW PT HI-MOD SEVER 45 MINUTES: CPT | Performed by: FAMILY MEDICINE

## 2022-07-28 NOTE — PROGRESS NOTES
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  - On continue was 2 L of oxygen via nasal cannula  - monitor oxygen saturations    2  Chronic respiratory failure with hypoxia (HCC)  - due to #1  - on O2    3  Ambulatory dysfunction  - uses walker for mobility  - fall precautions in place    4  Essential hypertension  - continue losartan 50 mg p o  daily  - continue amlodipine 5 mg p o  daily  - continue furosemide 20 mg p o  daily  - continue aspirin 81 mg p o  daily    5  Multiple subsegmental pulmonary emboli without acute cor pulmonale (HCC)  - continue Eliquis 5 mg p o  b i d  All medications and routine orders were reviewed and updated as needed  Plan discussed with: staff    Chief Complaint     Seen for admission at [address], a 79 y/o female with past medical history of dementia, CAD, HTN, CKD 2, GERD, anxiety, macular degeneration and Osteoporosis got admitted to [facility] for long term stay  She was seen and examined at bedside, stable  She is able to give good history  She lived at home with her daughter  She need mod help with ADLs  She uses walker  She is on continuous 2 L of oxygen via nasal cannula  She denies any pain  Staff have no concerns at this time      HISTORY:  Past Medical History:   Diagnosis Date    Allergic     Anxiety     Arthritis     left knee    Cancer (HCC)     skin: face, nose and back (100+ sutures)    Chronic kidney disease     stage 2    Coronary artery disease     Depression     Disease of thyroid gland     Edema     Per [place]     Generalized headaches     Per [place]     GERD (gastroesophageal reflux disease)     Per [place]     Hiatal hernia     History of echocardiogram 02/2016    Stress Echo 3/15/16- Normal stress echo with no echocardiographic evidence of myocardial infraction or myocardial ischemia  This was technically difficult study  Echo 2/9/2016- Mild LVH  EF of 50-55%  LV diastolic noncompliance  Mitral annular calcification with 2+ MR  Normal opening of the cardiac valves    History of EKG 10/20/2017     Normal sinus rhythm, possible left atrial enlargement  Borderline ECG  EKG 7/28/17- Sinus rhytm with 1st degree AV block  Cannot rule out inferior infarct, age undetermined  Abnormal ECG  EKG 1/13/17- Normal sinus rhythm, nonspecific T wave abnormality  Abnormal ECG  EKG 2/26/16- Sinus rhythm with 1st degree AV block  Possible left atrial enlargement  Cannot rule out inferior infarct, age Tally Elkin History of Holter monitoring 12/22/2015    The baseline rhythm was of sinus origin with an average rate of 72bpm, (range: 59-98bpm)  There were frequant single uniform PVCs (averaging over 1600 beats per hour)  There were a few episodes of asymptomatic sinus arrhythmia with sinus bradycardia  There were a few PACs (averaging 8 4 beats per hour)  Aside from sinus arrhythmia, there were no other sustained cardiac dysrhythmias   History of stress test 03/15/2016     Negative EKG part of stress echo at 100% age predicted maximum heart rate  Frequant PVC's in the pattern of ventricular bigeminy at rest and in recovery with significant suppression during exercise  No evidence of complex dysrhythmias  Resting hypertension with normal blood pressure response to exercise  Exaggreated heart rate response to exercise   Cardiolite 1/8/16- Moderately abnormal strudy wi    Solomon (hard of hearing)     bilat HA    Hypertension     Macular degeneration     Murmur, heart     Osteoporosis     Per [place]     Subclinical hyperthyroidism     Thyroid nodule     right thyroid nodule (neg biopsy, pe post) -Per [place] Robles     Vision problems     mac degenaration- Paras Baptister      Family History   Problem Relation Age of Onset    Diabetes Mother     Heart disease Father         massive MI exp 72    Kidney disease Brother     Ovarian cancer Daughter         Ovarian cancer, disseminated - Per [place]     Hypertension Daughter         Per [place]      Social History     Socioeconomic History    Marital status:      Spouse name: Not on file    Number of children: 2    Years of education: Not on file    Highest education level: Not on file   Occupational History    Not on file   Tobacco Use    Smoking status: Never Smoker    Smokeless tobacco: Never Used   Vaping Use    Vaping Use: Never used   Substance and Sexual Activity    Alcohol use: Never    Drug use: No    Sexual activity: Not Currently   Other Topics Concern    Not on file   Social History Narrative    Most recent tobacco use [id number]      Do you currently or have you served in the [employer]:   No      Were you activated, into active duty, as a member of the [employer] or as a Reservist:   No      High blood pressure:   Yes      Diet:   Regular      Social Determinants of Health     Financial Resource Strain: Not on file   Food Insecurity: Not on file   Transportation Needs: No Transportation Needs    Lack of Transportation (Medical): No    Lack of Transportation (Non-Medical): No   Physical Activity: Not on file   Stress: Not on file   Social Connections: Not on file   Intimate Partner Violence: Not on file   Housing Stability: Not on file       Allergies: Allergies   Allergen Reactions    Colchicine     Hydralazine Swelling     Cheeks swelled after IV Hydralazine    Tramadol        Review of Systems     Review of Systems   Constitutional: Positive for fatigue  Negative for activity change and fever  HENT: Positive for hearing loss  Negative for dental problem and trouble swallowing  Eyes: Negative for photophobia and visual disturbance  Respiratory: Negative for cough and shortness of breath  Cardiovascular: Negative for chest pain, palpitations and leg swelling  Gastrointestinal: Negative for abdominal pain, constipation, diarrhea, nausea and vomiting  Genitourinary: Negative for difficulty urinating and dysuria  Musculoskeletal: Positive for gait problem  Negative for arthralgias  Neurological: Positive for weakness  Negative for dizziness and headaches  As in HPI  Medications and orders     All medications reviewed and updated in Nursing Home EMR  Objective     Vitals: stable    Physical Exam  Vitals and nursing note reviewed  Constitutional:       General: She is not in acute distress  Appearance: She is well-developed  She is obese  She is not diaphoretic  HENT:      Head: Normocephalic and atraumatic  Nose: Nose normal       Mouth/Throat:      Mouth: Mucous membranes are moist       Pharynx: Oropharynx is clear  No oropharyngeal exudate  Eyes:      General: No scleral icterus  Right eye: No discharge  Left eye: No discharge  Extraocular Movements: Extraocular movements intact  Conjunctiva/sclera: Conjunctivae normal    Cardiovascular:      Rate and Rhythm: Normal rate and regular rhythm  Heart sounds: Murmur heard  Pulmonary:      Effort: Pulmonary effort is normal  No respiratory distress  Breath sounds: Normal breath sounds  No wheezing  Chest:      Chest wall: No tenderness  Abdominal:      General: Bowel sounds are normal       Palpations: Abdomen is soft  Tenderness: There is no abdominal tenderness  There is no guarding or rebound  Musculoskeletal:         General: No tenderness or deformity  Normal range of motion  Cervical back: Normal range of motion and neck supple  Right lower leg: No edema  Left lower leg: No edema  Skin:     General: Skin is warm and dry     Neurological:      Mental Status: She is alert and oriented to person, place, and time  Cranial Nerves: No cranial nerve deficit  Psychiatric:         Mood and Affect: Mood normal          Behavior: Behavior normal          Pertinent Laboratory/Diagnostic Studies: The following labs/studies were reviewed please see chart or hospital paperwork for details      - Counseling Documentation: patient was counseled regarding: prognosis

## 2022-07-29 RX ORDER — LOSARTAN POTASSIUM 50 MG/1
TABLET ORAL
Qty: 180 TABLET | Refills: 2 | Status: SHIPPED | OUTPATIENT
Start: 2022-07-29